# Patient Record
Sex: FEMALE | Race: WHITE | NOT HISPANIC OR LATINO | Employment: OTHER | URBAN - METROPOLITAN AREA
[De-identification: names, ages, dates, MRNs, and addresses within clinical notes are randomized per-mention and may not be internally consistent; named-entity substitution may affect disease eponyms.]

---

## 2023-01-08 ENCOUNTER — APPOINTMENT (OUTPATIENT)
Dept: RADIOLOGY | Facility: MEDICAL CENTER | Age: 73
DRG: 853 | End: 2023-01-08
Attending: EMERGENCY MEDICINE
Payer: MEDICARE

## 2023-01-08 ENCOUNTER — HOSPITAL ENCOUNTER (OUTPATIENT)
Dept: RADIOLOGY | Facility: MEDICAL CENTER | Age: 73
End: 2023-01-08

## 2023-01-08 ENCOUNTER — HOSPITAL ENCOUNTER (INPATIENT)
Facility: MEDICAL CENTER | Age: 73
LOS: 14 days | DRG: 853 | End: 2023-01-22
Attending: EMERGENCY MEDICINE | Admitting: STUDENT IN AN ORGANIZED HEALTH CARE EDUCATION/TRAINING PROGRAM
Payer: MEDICARE

## 2023-01-08 DIAGNOSIS — L89.314 PRESSURE INJURY OF RIGHT BUTTOCK, STAGE 4 (HCC): ICD-10-CM

## 2023-01-08 DIAGNOSIS — J90 PLEURAL EFFUSION: ICD-10-CM

## 2023-01-08 DIAGNOSIS — S72.91XB TYPE I OR II OPEN FRACTURE OF RIGHT FEMUR, UNSPECIFIED FRACTURE MORPHOLOGY, UNSPECIFIED PORTION OF FEMUR, INITIAL ENCOUNTER (HCC): ICD-10-CM

## 2023-01-08 DIAGNOSIS — M00.9 PYOGENIC ARTHRITIS OF LEFT KNEE JOINT, DUE TO UNSPECIFIED ORGANISM (HCC): Primary | ICD-10-CM

## 2023-01-08 DIAGNOSIS — S72.401B TYPE I OR II OPEN FRACTURE OF DISTAL END OF RIGHT FEMUR, UNSPECIFIED FRACTURE MORPHOLOGY, INITIAL ENCOUNTER (HCC): ICD-10-CM
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
## 2023-01-08 DIAGNOSIS — D64.9 ANEMIA, UNSPECIFIED TYPE: ICD-10-CM

## 2023-01-08 PROBLEM — E83.51 HYPOCALCEMIA: Status: ACTIVE | Noted: 2023-01-08

## 2023-01-08 PROBLEM — A41.9 SEPSIS (HCC): Status: ACTIVE | Noted: 2023-01-08

## 2023-01-08 PROBLEM — D50.9 MICROCYTIC ANEMIA: Status: ACTIVE | Noted: 2023-01-08

## 2023-01-08 PROBLEM — E44.1 MILD PROTEIN-CALORIE MALNUTRITION (HCC): Status: ACTIVE | Noted: 2023-01-08

## 2023-01-08 PROBLEM — N31.9 NEUROPATHIC BLADDER: Status: ACTIVE | Noted: 2023-01-08

## 2023-01-08 LAB
ABO + RH BLD: NORMAL
ABO GROUP BLD: NORMAL
ALBUMIN SERPL BCP-MCNC: 2.7 G/DL (ref 3.2–4.9)
ALBUMIN/GLOB SERPL: 1 G/DL
ALP SERPL-CCNC: 110 U/L (ref 30–99)
ALT SERPL-CCNC: 11 U/L (ref 2–50)
ANION GAP SERPL CALC-SCNC: 9 MMOL/L (ref 7–16)
APTT PPP: 32 SEC (ref 24.7–36)
AST SERPL-CCNC: 34 U/L (ref 12–45)
BILIRUB SERPL-MCNC: 0.3 MG/DL (ref 0.1–1.5)
BLD GP AB SCN SERPL QL: NORMAL
BUN SERPL-MCNC: 18 MG/DL (ref 8–22)
CALCIUM ALBUM COR SERPL-MCNC: 7.9 MG/DL (ref 8.5–10.5)
CALCIUM SERPL-MCNC: 6.9 MG/DL (ref 8.5–10.5)
CHLORIDE SERPL-SCNC: 111 MMOL/L (ref 96–112)
CO2 SERPL-SCNC: 19 MMOL/L (ref 20–33)
CREAT SERPL-MCNC: 0.4 MG/DL (ref 0.5–1.4)
ERYTHROCYTE [DISTWIDTH] IN BLOOD BY AUTOMATED COUNT: 69.6 FL (ref 35.9–50)
ETHANOL BLD-MCNC: <10.1 MG/DL
GFR SERPLBLD CREATININE-BSD FMLA CKD-EPI: 106 ML/MIN/1.73 M 2
GLOBULIN SER CALC-MCNC: 2.6 G/DL (ref 1.9–3.5)
GLUCOSE SERPL-MCNC: 88 MG/DL (ref 65–99)
HCT VFR BLD AUTO: 27.5 % (ref 37–47)
HGB BLD-MCNC: 8.3 G/DL (ref 12–16)
INR PPP: 1.02 (ref 0.87–1.13)
LACTATE SERPL-SCNC: 1.4 MMOL/L (ref 0.5–2)
MAGNESIUM SERPL-MCNC: 1.6 MG/DL (ref 1.5–2.5)
MCH RBC QN AUTO: 31.3 PG (ref 27–33)
MCHC RBC AUTO-ENTMCNC: 30.2 G/DL (ref 33.6–35)
MCV RBC AUTO: 103.8 FL (ref 81.4–97.8)
PLATELET # BLD AUTO: 460 K/UL (ref 164–446)
PMV BLD AUTO: 8.8 FL (ref 9–12.9)
POTASSIUM SERPL-SCNC: 4.1 MMOL/L (ref 3.6–5.5)
PROT SERPL-MCNC: 5.3 G/DL (ref 6–8.2)
PROTHROMBIN TIME: 13.3 SEC (ref 12–14.6)
RBC # BLD AUTO: 2.65 M/UL (ref 4.2–5.4)
RH BLD: NORMAL
SODIUM SERPL-SCNC: 139 MMOL/L (ref 135–145)
WBC # BLD AUTO: 14.9 K/UL (ref 4.8–10.8)

## 2023-01-08 PROCEDURE — 85610 PROTHROMBIN TIME: CPT

## 2023-01-08 PROCEDURE — 96365 THER/PROPH/DIAG IV INF INIT: CPT

## 2023-01-08 PROCEDURE — 87040 BLOOD CULTURE FOR BACTERIA: CPT

## 2023-01-08 PROCEDURE — 700102 HCHG RX REV CODE 250 W/ 637 OVERRIDE(OP): Performed by: STUDENT IN AN ORGANIZED HEALTH CARE EDUCATION/TRAINING PROGRAM

## 2023-01-08 PROCEDURE — 73700 CT LOWER EXTREMITY W/O DYE: CPT | Mod: RT

## 2023-01-08 PROCEDURE — 36415 COLL VENOUS BLD VENIPUNCTURE: CPT

## 2023-01-08 PROCEDURE — 87077 CULTURE AEROBIC IDENTIFY: CPT

## 2023-01-08 PROCEDURE — 72125 CT NECK SPINE W/O DYE: CPT

## 2023-01-08 PROCEDURE — 83605 ASSAY OF LACTIC ACID: CPT

## 2023-01-08 PROCEDURE — A9270 NON-COVERED ITEM OR SERVICE: HCPCS | Performed by: STUDENT IN AN ORGANIZED HEALTH CARE EDUCATION/TRAINING PROGRAM

## 2023-01-08 PROCEDURE — 85027 COMPLETE CBC AUTOMATED: CPT

## 2023-01-08 PROCEDURE — 86850 RBC ANTIBODY SCREEN: CPT

## 2023-01-08 PROCEDURE — 0241U HCHG SARS-COV-2 COVID-19 NFCT DS RESP RNA 4 TRGT MIC: CPT

## 2023-01-08 PROCEDURE — 82077 ASSAY SPEC XCP UR&BREATH IA: CPT

## 2023-01-08 PROCEDURE — 85730 THROMBOPLASTIN TIME PARTIAL: CPT

## 2023-01-08 PROCEDURE — G0390 TRAUMA RESPONS W/HOSP CRITI: HCPCS

## 2023-01-08 PROCEDURE — C9803 HOPD COVID-19 SPEC COLLECT: HCPCS | Performed by: EMERGENCY MEDICINE

## 2023-01-08 PROCEDURE — 96372 THER/PROPH/DIAG INJ SC/IM: CPT

## 2023-01-08 PROCEDURE — 87070 CULTURE OTHR SPECIMN AEROBIC: CPT

## 2023-01-08 PROCEDURE — 80053 COMPREHEN METABOLIC PANEL: CPT

## 2023-01-08 PROCEDURE — 70450 CT HEAD/BRAIN W/O DYE: CPT

## 2023-01-08 PROCEDURE — 96375 TX/PRO/DX INJ NEW DRUG ADDON: CPT

## 2023-01-08 PROCEDURE — 83735 ASSAY OF MAGNESIUM: CPT

## 2023-01-08 PROCEDURE — 71045 X-RAY EXAM CHEST 1 VIEW: CPT

## 2023-01-08 PROCEDURE — 96367 TX/PROPH/DG ADDL SEQ IV INF: CPT

## 2023-01-08 PROCEDURE — 86901 BLOOD TYPING SEROLOGIC RH(D): CPT

## 2023-01-08 PROCEDURE — 770022 HCHG ROOM/CARE - ICU (200)

## 2023-01-08 PROCEDURE — 86900 BLOOD TYPING SEROLOGIC ABO: CPT

## 2023-01-08 PROCEDURE — 87186 SC STD MICRODIL/AGAR DIL: CPT

## 2023-01-08 PROCEDURE — 700105 HCHG RX REV CODE 258: Performed by: EMERGENCY MEDICINE

## 2023-01-08 PROCEDURE — 700111 HCHG RX REV CODE 636 W/ 250 OVERRIDE (IP): Performed by: EMERGENCY MEDICINE

## 2023-01-08 PROCEDURE — 700111 HCHG RX REV CODE 636 W/ 250 OVERRIDE (IP): Performed by: STUDENT IN AN ORGANIZED HEALTH CARE EDUCATION/TRAINING PROGRAM

## 2023-01-08 PROCEDURE — 82607 VITAMIN B-12: CPT

## 2023-01-08 PROCEDURE — 87205 SMEAR GRAM STAIN: CPT

## 2023-01-08 PROCEDURE — 96368 THER/DIAG CONCURRENT INF: CPT

## 2023-01-08 PROCEDURE — 87641 MR-STAPH DNA AMP PROBE: CPT

## 2023-01-08 PROCEDURE — 99291 CRITICAL CARE FIRST HOUR: CPT

## 2023-01-08 RX ORDER — ACETAMINOPHEN 325 MG/1
650 TABLET ORAL EVERY 6 HOURS PRN
Status: DISCONTINUED | OUTPATIENT
Start: 2023-01-08 | End: 2023-01-22 | Stop reason: HOSPADM

## 2023-01-08 RX ORDER — HEPARIN SODIUM 5000 [USP'U]/ML
5000 INJECTION, SOLUTION INTRAVENOUS; SUBCUTANEOUS ONCE
Status: COMPLETED | OUTPATIENT
Start: 2023-01-08 | End: 2023-01-08

## 2023-01-08 RX ORDER — AMPICILLIN TRIHYDRATE 500 MG
450 CAPSULE ORAL DAILY
Status: ON HOLD | COMMUNITY
End: 2023-01-20

## 2023-01-08 RX ORDER — CALCIUM GLUCONATE 20 MG/ML
2 INJECTION, SOLUTION INTRAVENOUS ONCE
Status: COMPLETED | OUTPATIENT
Start: 2023-01-08 | End: 2023-01-09

## 2023-01-08 RX ORDER — OXYBUTYNIN CHLORIDE 5 MG/1
10 TABLET, EXTENDED RELEASE ORAL 2 TIMES DAILY
Status: DISCONTINUED | OUTPATIENT
Start: 2023-01-08 | End: 2023-01-22 | Stop reason: HOSPADM

## 2023-01-08 RX ORDER — LEVOTHYROXINE SODIUM 0.05 MG/1
50 TABLET ORAL
Status: DISCONTINUED | OUTPATIENT
Start: 2023-01-09 | End: 2023-01-22 | Stop reason: HOSPADM

## 2023-01-08 RX ORDER — LYSINE HCL 500 MG
500 TABLET ORAL DAILY
Status: ON HOLD | COMMUNITY
End: 2023-01-20

## 2023-01-08 RX ORDER — MULTIVIT WITH MINERALS/LUTEIN
1000 TABLET ORAL DAILY
Status: ON HOLD | COMMUNITY
End: 2023-01-20

## 2023-01-08 RX ORDER — CHOLECALCIFEROL (VITAMIN D3) 125 MCG
500 CAPSULE ORAL DAILY
Status: ON HOLD | COMMUNITY
End: 2023-01-20

## 2023-01-08 RX ORDER — SODIUM CHLORIDE, SODIUM LACTATE, POTASSIUM CHLORIDE, CALCIUM CHLORIDE 600; 310; 30; 20 MG/100ML; MG/100ML; MG/100ML; MG/100ML
1000 INJECTION, SOLUTION INTRAVENOUS ONCE
Status: COMPLETED | OUTPATIENT
Start: 2023-01-08 | End: 2023-01-09

## 2023-01-08 RX ORDER — CHOLECALCIFEROL (VITAMIN D3) 125 MCG
500 CAPSULE ORAL DAILY
Status: DISCONTINUED | OUTPATIENT
Start: 2023-01-09 | End: 2023-01-22 | Stop reason: HOSPADM

## 2023-01-08 RX ORDER — PYRIDOXINE HCL (VITAMIN B6) 50 MG
50 TABLET ORAL DAILY
Status: ON HOLD | COMMUNITY
End: 2023-01-20

## 2023-01-08 RX ORDER — M-VIT,TX,IRON,MINS/CALC/FOLIC 27MG-0.4MG
1 TABLET ORAL DAILY
Status: ON HOLD | COMMUNITY
End: 2023-01-20

## 2023-01-08 RX ORDER — OXYBUTYNIN CHLORIDE 10 MG/1
10 TABLET, EXTENDED RELEASE ORAL 2 TIMES DAILY
Status: ON HOLD | COMMUNITY
End: 2023-01-20

## 2023-01-08 RX ORDER — POLYETHYLENE GLYCOL 3350 17 G/17G
1 POWDER, FOR SOLUTION ORAL
Status: DISCONTINUED | OUTPATIENT
Start: 2023-01-08 | End: 2023-01-22 | Stop reason: HOSPADM

## 2023-01-08 RX ORDER — AMOXICILLIN 250 MG
2 CAPSULE ORAL 2 TIMES DAILY
Status: DISCONTINUED | OUTPATIENT
Start: 2023-01-08 | End: 2023-01-22 | Stop reason: HOSPADM

## 2023-01-08 RX ORDER — SODIUM CHLORIDE, SODIUM LACTATE, POTASSIUM CHLORIDE, AND CALCIUM CHLORIDE .6; .31; .03; .02 G/100ML; G/100ML; G/100ML; G/100ML
30 INJECTION, SOLUTION INTRAVENOUS
Status: DISCONTINUED | OUTPATIENT
Start: 2023-01-08 | End: 2023-01-22 | Stop reason: HOSPADM

## 2023-01-08 RX ORDER — BISACODYL 10 MG
10 SUPPOSITORY, RECTAL RECTAL
Status: DISCONTINUED | OUTPATIENT
Start: 2023-01-08 | End: 2023-01-22 | Stop reason: HOSPADM

## 2023-01-08 RX ORDER — LEVOTHYROXINE SODIUM 0.05 MG/1
50 TABLET ORAL
COMMUNITY

## 2023-01-08 RX ADMIN — CEFTRIAXONE SODIUM 1000 MG: 10 INJECTION, POWDER, FOR SOLUTION INTRAVENOUS at 23:53

## 2023-01-08 RX ADMIN — OXYBUTYNIN CHLORIDE 10 MG: 10 TABLET, EXTENDED RELEASE ORAL at 23:53

## 2023-01-08 RX ADMIN — VANCOMYCIN HYDROCHLORIDE 1500 MG: 500 INJECTION, POWDER, LYOPHILIZED, FOR SOLUTION INTRAVENOUS at 23:03

## 2023-01-08 RX ADMIN — SODIUM CHLORIDE, POTASSIUM CHLORIDE, SODIUM LACTATE AND CALCIUM CHLORIDE 1000 ML: 600; 310; 30; 20 INJECTION, SOLUTION INTRAVENOUS at 21:52

## 2023-01-08 RX ADMIN — CEFAZOLIN 2 G: 2 INJECTION, POWDER, FOR SOLUTION INTRAMUSCULAR; INTRAVENOUS at 22:09

## 2023-01-08 RX ADMIN — CALCIUM GLUCONATE 2 G: 20 INJECTION, SOLUTION INTRAVENOUS at 23:54

## 2023-01-08 RX ADMIN — HEPARIN SODIUM 5000 UNITS: 5000 INJECTION, SOLUTION INTRAVENOUS; SUBCUTANEOUS at 23:52

## 2023-01-09 ENCOUNTER — APPOINTMENT (OUTPATIENT)
Dept: RADIOLOGY | Facility: MEDICAL CENTER | Age: 73
DRG: 853 | End: 2023-01-09
Attending: ORTHOPAEDIC SURGERY
Payer: MEDICARE

## 2023-01-09 ENCOUNTER — ANESTHESIA EVENT (OUTPATIENT)
Dept: SURGERY | Facility: MEDICAL CENTER | Age: 73
DRG: 853 | End: 2023-01-09
Payer: MEDICARE

## 2023-01-09 ENCOUNTER — APPOINTMENT (OUTPATIENT)
Dept: RADIOLOGY | Facility: MEDICAL CENTER | Age: 73
DRG: 853 | End: 2023-01-09
Attending: STUDENT IN AN ORGANIZED HEALTH CARE EDUCATION/TRAINING PROGRAM
Payer: MEDICARE

## 2023-01-09 ENCOUNTER — ANESTHESIA (OUTPATIENT)
Dept: SURGERY | Facility: MEDICAL CENTER | Age: 73
DRG: 853 | End: 2023-01-09
Payer: MEDICARE

## 2023-01-09 PROBLEM — D64.9 ANEMIA: Status: ACTIVE | Noted: 2023-01-09

## 2023-01-09 PROBLEM — R65.21 SEPTIC SHOCK (HCC): Status: ACTIVE | Noted: 2023-01-08

## 2023-01-09 LAB
ALBUMIN SERPL BCP-MCNC: 2.6 G/DL (ref 3.2–4.9)
ALBUMIN/GLOB SERPL: 1 G/DL
ALP SERPL-CCNC: 111 U/L (ref 30–99)
ALT SERPL-CCNC: 7 U/L (ref 2–50)
ANION GAP SERPL CALC-SCNC: 8 MMOL/L (ref 7–16)
ANION GAP SERPL CALC-SCNC: 9 MMOL/L (ref 7–16)
ANISOCYTOSIS BLD QL SMEAR: ABNORMAL
APPEARANCE UR: CLEAR
AST SERPL-CCNC: 13 U/L (ref 12–45)
BACTERIA #/AREA URNS HPF: ABNORMAL /HPF
BASOPHILS # BLD AUTO: 0 % (ref 0–1.8)
BASOPHILS # BLD AUTO: 0.2 % (ref 0–1.8)
BASOPHILS # BLD: 0 K/UL (ref 0–0.12)
BASOPHILS # BLD: 0.04 K/UL (ref 0–0.12)
BILIRUB SERPL-MCNC: 0.3 MG/DL (ref 0.1–1.5)
BILIRUB UR QL STRIP.AUTO: NEGATIVE
BUN SERPL-MCNC: 14 MG/DL (ref 8–22)
BUN SERPL-MCNC: 17 MG/DL (ref 8–22)
CA-I SERPL-SCNC: 1.1 MMOL/L (ref 1.1–1.3)
CALCIUM ALBUM COR SERPL-MCNC: 9.2 MG/DL (ref 8.5–10.5)
CALCIUM SERPL-MCNC: 7.4 MG/DL (ref 8.5–10.5)
CALCIUM SERPL-MCNC: 8.1 MG/DL (ref 8.5–10.5)
CHLORIDE SERPL-SCNC: 109 MMOL/L (ref 96–112)
CHLORIDE SERPL-SCNC: 110 MMOL/L (ref 96–112)
CO2 SERPL-SCNC: 20 MMOL/L (ref 20–33)
CO2 SERPL-SCNC: 21 MMOL/L (ref 20–33)
COLOR UR: YELLOW
CORTIS SERPL-MCNC: 7.6 UG/DL (ref 0–23)
CREAT SERPL-MCNC: 0.42 MG/DL (ref 0.5–1.4)
CREAT SERPL-MCNC: 0.44 MG/DL (ref 0.5–1.4)
EOSINOPHIL # BLD AUTO: 0.01 K/UL (ref 0–0.51)
EOSINOPHIL # BLD AUTO: 0.21 K/UL (ref 0–0.51)
EOSINOPHIL NFR BLD: 0 % (ref 0–6.9)
EOSINOPHIL NFR BLD: 1.7 % (ref 0–6.9)
EPI CELLS #/AREA URNS HPF: NEGATIVE /HPF
ERYTHROCYTE [DISTWIDTH] IN BLOOD BY AUTOMATED COUNT: 69.2 FL (ref 35.9–50)
ERYTHROCYTE [DISTWIDTH] IN BLOOD BY AUTOMATED COUNT: 69.5 FL (ref 35.9–50)
FLUAV RNA SPEC QL NAA+PROBE: NEGATIVE
FLUBV RNA SPEC QL NAA+PROBE: NEGATIVE
FOLATE SERPL-MCNC: 12.5 NG/ML
FUNGUS SPEC FUNGUS STN: NORMAL
GFR SERPLBLD CREATININE-BSD FMLA CKD-EPI: 103 ML/MIN/1.73 M 2
GFR SERPLBLD CREATININE-BSD FMLA CKD-EPI: 105 ML/MIN/1.73 M 2
GLOBULIN SER CALC-MCNC: 2.5 G/DL (ref 1.9–3.5)
GLUCOSE SERPL-MCNC: 118 MG/DL (ref 65–99)
GLUCOSE SERPL-MCNC: 99 MG/DL (ref 65–99)
GLUCOSE UR STRIP.AUTO-MCNC: NEGATIVE MG/DL
GRAM STN SPEC: NORMAL
HCT VFR BLD AUTO: 23.9 % (ref 37–47)
HCT VFR BLD AUTO: 25.9 % (ref 37–47)
HGB BLD-MCNC: 7.2 G/DL (ref 12–16)
HGB BLD-MCNC: 7.8 G/DL (ref 12–16)
HYALINE CASTS #/AREA URNS LPF: ABNORMAL /LPF
HYPOCHROMIA BLD QL SMEAR: ABNORMAL
IMM GRANULOCYTES # BLD AUTO: 0.22 K/UL (ref 0–0.11)
IMM GRANULOCYTES NFR BLD AUTO: 0.9 % (ref 0–0.9)
IRON SATN MFR SERPL: 9 % (ref 15–55)
IRON SERPL-MCNC: 20 UG/DL (ref 40–170)
KETONES UR STRIP.AUTO-MCNC: 15 MG/DL
LACTATE SERPL-SCNC: 1.7 MMOL/L (ref 0.5–2)
LEUKOCYTE ESTERASE UR QL STRIP.AUTO: ABNORMAL
LYMPHOCYTES # BLD AUTO: 0.47 K/UL (ref 1–4.8)
LYMPHOCYTES # BLD AUTO: 0.76 K/UL (ref 1–4.8)
LYMPHOCYTES NFR BLD: 1.9 % (ref 22–41)
LYMPHOCYTES NFR BLD: 6.1 % (ref 22–41)
MACROCYTES BLD QL SMEAR: ABNORMAL
MAGNESIUM SERPL-MCNC: 1.7 MG/DL (ref 1.5–2.5)
MANUAL DIFF BLD: NORMAL
MCH RBC QN AUTO: 31.3 PG (ref 27–33)
MCH RBC QN AUTO: 31.6 PG (ref 27–33)
MCHC RBC AUTO-ENTMCNC: 30.1 G/DL (ref 33.6–35)
MCHC RBC AUTO-ENTMCNC: 30.1 G/DL (ref 33.6–35)
MCV RBC AUTO: 104 FL (ref 81.4–97.8)
MCV RBC AUTO: 104.8 FL (ref 81.4–97.8)
MICRO URNS: ABNORMAL
MICROCYTES BLD QL SMEAR: ABNORMAL
MONOCYTES # BLD AUTO: 0.35 K/UL (ref 0–0.85)
MONOCYTES # BLD AUTO: 0.64 K/UL (ref 0–0.85)
MONOCYTES NFR BLD AUTO: 1.4 % (ref 0–13.4)
MONOCYTES NFR BLD AUTO: 5.2 % (ref 0–13.4)
MORPHOLOGY BLD-IMP: NORMAL
NEUTROPHILS # BLD AUTO: 10.79 K/UL (ref 2–7.15)
NEUTROPHILS # BLD AUTO: 23.25 K/UL (ref 2–7.15)
NEUTROPHILS NFR BLD: 81.8 % (ref 44–72)
NEUTROPHILS NFR BLD: 95.6 % (ref 44–72)
NEUTS BAND NFR BLD MANUAL: 5.2 % (ref 0–10)
NITRITE UR QL STRIP.AUTO: POSITIVE
NRBC # BLD AUTO: 0 K/UL
NRBC # BLD AUTO: 0 K/UL
NRBC BLD-RTO: 0 /100 WBC
NRBC BLD-RTO: 0 /100 WBC
PH UR STRIP.AUTO: 5.5 [PH] (ref 5–8)
PHOSPHATE SERPL-MCNC: 2.4 MG/DL (ref 2.5–4.5)
PLATELET # BLD AUTO: 504 K/UL (ref 164–446)
PLATELET # BLD AUTO: 544 K/UL (ref 164–446)
PLATELET BLD QL SMEAR: NORMAL
PMV BLD AUTO: 8.7 FL (ref 9–12.9)
PMV BLD AUTO: 8.8 FL (ref 9–12.9)
POLYCHROMASIA BLD QL SMEAR: NORMAL
POTASSIUM SERPL-SCNC: 4.1 MMOL/L (ref 3.6–5.5)
POTASSIUM SERPL-SCNC: 4.2 MMOL/L (ref 3.6–5.5)
PROT SERPL-MCNC: 5.1 G/DL (ref 6–8.2)
PROT UR QL STRIP: NEGATIVE MG/DL
RBC # BLD AUTO: 2.28 M/UL (ref 4.2–5.4)
RBC # BLD AUTO: 2.49 M/UL (ref 4.2–5.4)
RBC # URNS HPF: ABNORMAL /HPF
RBC BLD AUTO: PRESENT
RBC UR QL AUTO: NEGATIVE
RSV RNA SPEC QL NAA+PROBE: NEGATIVE
SARS-COV-2 RNA RESP QL NAA+PROBE: NOTDETECTED
SCCMEC + MECA PNL NOSE NAA+PROBE: NEGATIVE
SIGNIFICANT IND 70042: NORMAL
SITE SITE: NORMAL
SODIUM SERPL-SCNC: 138 MMOL/L (ref 135–145)
SODIUM SERPL-SCNC: 139 MMOL/L (ref 135–145)
SOURCE SOURCE: NORMAL
SP GR UR STRIP.AUTO: 1.03
SPECIMEN SOURCE: NORMAL
T4 FREE SERPL-MCNC: 0.94 NG/DL (ref 0.93–1.7)
TIBC SERPL-MCNC: 218 UG/DL (ref 250–450)
TSH SERPL DL<=0.005 MIU/L-ACNC: 6.3 UIU/ML (ref 0.38–5.33)
UIBC SERPL-MCNC: 198 UG/DL (ref 110–370)
UROBILINOGEN UR STRIP.AUTO-MCNC: 0.2 MG/DL
VIT B12 SERPL-MCNC: 2559 PG/ML (ref 211–911)
VIT B12 SERPL-MCNC: 2632 PG/ML (ref 211–911)
WBC # BLD AUTO: 12.4 K/UL (ref 4.8–10.8)
WBC # BLD AUTO: 24.3 K/UL (ref 4.8–10.8)
WBC #/AREA URNS HPF: ABNORMAL /HPF

## 2023-01-09 PROCEDURE — 160048 HCHG OR STATISTICAL LEVEL 1-5: Performed by: ORTHOPAEDIC SURGERY

## 2023-01-09 PROCEDURE — 87206 SMEAR FLUORESCENT/ACID STAI: CPT

## 2023-01-09 PROCEDURE — 303105 HCHG CATHETER EXTRA

## 2023-01-09 PROCEDURE — 0QHB06Z INSERTION OF INTRAMEDULLARY INTERNAL FIXATION DEVICE INTO RIGHT LOWER FEMUR, OPEN APPROACH: ICD-10-PCS | Performed by: ORTHOPAEDIC SURGERY

## 2023-01-09 PROCEDURE — 3E0T3BZ INTRODUCTION OF ANESTHETIC AGENT INTO PERIPHERAL NERVES AND PLEXI, PERCUTANEOUS APPROACH: ICD-10-PCS | Performed by: STUDENT IN AN ORGANIZED HEALTH CARE EDUCATION/TRAINING PROGRAM

## 2023-01-09 PROCEDURE — C1713 ANCHOR/SCREW BN/BN,TIS/BN: HCPCS | Performed by: ORTHOPAEDIC SURGERY

## 2023-01-09 PROCEDURE — 160035 HCHG PACU - 1ST 60 MINS PHASE I: Performed by: ORTHOPAEDIC SURGERY

## 2023-01-09 PROCEDURE — 700111 HCHG RX REV CODE 636 W/ 250 OVERRIDE (IP): Performed by: STUDENT IN AN ORGANIZED HEALTH CARE EDUCATION/TRAINING PROGRAM

## 2023-01-09 PROCEDURE — 770022 HCHG ROOM/CARE - ICU (200)

## 2023-01-09 PROCEDURE — 87015 SPECIMEN INFECT AGNT CONCNTJ: CPT | Mod: 91

## 2023-01-09 PROCEDURE — 84443 ASSAY THYROID STIM HORMONE: CPT

## 2023-01-09 PROCEDURE — 73552 X-RAY EXAM OF FEMUR 2/>: CPT | Mod: RT

## 2023-01-09 PROCEDURE — 99100 ANES PT EXTEME AGE<1 YR&>70: CPT | Performed by: STUDENT IN AN ORGANIZED HEALTH CARE EDUCATION/TRAINING PROGRAM

## 2023-01-09 PROCEDURE — 700105 HCHG RX REV CODE 258: Performed by: STUDENT IN AN ORGANIZED HEALTH CARE EDUCATION/TRAINING PROGRAM

## 2023-01-09 PROCEDURE — 51702 INSERT TEMP BLADDER CATH: CPT

## 2023-01-09 PROCEDURE — 36415 COLL VENOUS BLD VENIPUNCTURE: CPT

## 2023-01-09 PROCEDURE — 82330 ASSAY OF CALCIUM: CPT

## 2023-01-09 PROCEDURE — 700102 HCHG RX REV CODE 250 W/ 637 OVERRIDE(OP): Performed by: STUDENT IN AN ORGANIZED HEALTH CARE EDUCATION/TRAINING PROGRAM

## 2023-01-09 PROCEDURE — 160002 HCHG RECOVERY MINUTES (STAT): Performed by: ORTHOPAEDIC SURGERY

## 2023-01-09 PROCEDURE — 87186 SC STD MICRODIL/AGAR DIL: CPT

## 2023-01-09 PROCEDURE — 01360 ANES OPEN PX LOWER 1/3 FEMUR: CPT | Performed by: STUDENT IN AN ORGANIZED HEALTH CARE EDUCATION/TRAINING PROGRAM

## 2023-01-09 PROCEDURE — 82533 TOTAL CORTISOL: CPT

## 2023-01-09 PROCEDURE — 700101 HCHG RX REV CODE 250: Performed by: STUDENT IN AN ORGANIZED HEALTH CARE EDUCATION/TRAINING PROGRAM

## 2023-01-09 PROCEDURE — 85007 BL SMEAR W/DIFF WBC COUNT: CPT

## 2023-01-09 PROCEDURE — 80048 BASIC METABOLIC PNL TOTAL CA: CPT

## 2023-01-09 PROCEDURE — 99233 SBSQ HOSP IP/OBS HIGH 50: CPT | Performed by: INTERNAL MEDICINE

## 2023-01-09 PROCEDURE — 81001 URINALYSIS AUTO W/SCOPE: CPT

## 2023-01-09 PROCEDURE — A9270 NON-COVERED ITEM OR SERVICE: HCPCS | Performed by: STUDENT IN AN ORGANIZED HEALTH CARE EDUCATION/TRAINING PROGRAM

## 2023-01-09 PROCEDURE — 84100 ASSAY OF PHOSPHORUS: CPT

## 2023-01-09 PROCEDURE — 160028 HCHG SURGERY MINUTES - 1ST 30 MINS LEVEL 3: Performed by: ORTHOPAEDIC SURGERY

## 2023-01-09 PROCEDURE — 160036 HCHG PACU - EA ADDL 30 MINS PHASE I: Performed by: ORTHOPAEDIC SURGERY

## 2023-01-09 PROCEDURE — 160009 HCHG ANES TIME/MIN: Performed by: ORTHOPAEDIC SURGERY

## 2023-01-09 PROCEDURE — 700105 HCHG RX REV CODE 258: Performed by: INTERNAL MEDICINE

## 2023-01-09 PROCEDURE — 502000 HCHG MISC OR IMPLANTS RC 0278: Performed by: ORTHOPAEDIC SURGERY

## 2023-01-09 PROCEDURE — 87116 MYCOBACTERIA CULTURE: CPT

## 2023-01-09 PROCEDURE — 83540 ASSAY OF IRON: CPT

## 2023-01-09 PROCEDURE — 87070 CULTURE OTHR SPECIMN AEROBIC: CPT | Mod: 91

## 2023-01-09 PROCEDURE — 99223 1ST HOSP IP/OBS HIGH 75: CPT | Mod: 57 | Performed by: ORTHOPAEDIC SURGERY

## 2023-01-09 PROCEDURE — 110371 HCHG SHELL REV 272: Performed by: ORTHOPAEDIC SURGERY

## 2023-01-09 PROCEDURE — 80053 COMPREHEN METABOLIC PANEL: CPT

## 2023-01-09 PROCEDURE — 87077 CULTURE AEROBIC IDENTIFY: CPT | Mod: 91

## 2023-01-09 PROCEDURE — 64447 NJX AA&/STRD FEMORAL NRV IMG: CPT | Performed by: ORTHOPAEDIC SURGERY

## 2023-01-09 PROCEDURE — 27506 TREATMENT OF THIGH FRACTURE: CPT | Mod: 80ROC,RT | Performed by: STUDENT IN AN ORGANIZED HEALTH CARE EDUCATION/TRAINING PROGRAM

## 2023-01-09 PROCEDURE — 700111 HCHG RX REV CODE 636 W/ 250 OVERRIDE (IP): Performed by: INTERNAL MEDICINE

## 2023-01-09 PROCEDURE — 83550 IRON BINDING TEST: CPT

## 2023-01-09 PROCEDURE — 82746 ASSAY OF FOLIC ACID SERUM: CPT

## 2023-01-09 PROCEDURE — 85025 COMPLETE CBC W/AUTO DIFF WBC: CPT

## 2023-01-09 PROCEDURE — 99291 CRITICAL CARE FIRST HOUR: CPT | Performed by: INTERNAL MEDICINE

## 2023-01-09 PROCEDURE — 87102 FUNGUS ISOLATION CULTURE: CPT | Mod: 91

## 2023-01-09 PROCEDURE — 700111 HCHG RX REV CODE 636 W/ 250 OVERRIDE (IP): Performed by: ORTHOPAEDIC SURGERY

## 2023-01-09 PROCEDURE — 87205 SMEAR GRAM STAIN: CPT | Mod: 91

## 2023-01-09 PROCEDURE — 96366 THER/PROPH/DIAG IV INF ADDON: CPT

## 2023-01-09 PROCEDURE — 84439 ASSAY OF FREE THYROXINE: CPT

## 2023-01-09 PROCEDURE — 83605 ASSAY OF LACTIC ACID: CPT

## 2023-01-09 PROCEDURE — 27506 TREATMENT OF THIGH FRACTURE: CPT | Mod: RT | Performed by: ORTHOPAEDIC SURGERY

## 2023-01-09 PROCEDURE — 160039 HCHG SURGERY MINUTES - EA ADDL 1 MIN LEVEL 3: Performed by: ORTHOPAEDIC SURGERY

## 2023-01-09 PROCEDURE — 87075 CULTR BACTERIA EXCEPT BLOOD: CPT | Mod: 91

## 2023-01-09 PROCEDURE — 83735 ASSAY OF MAGNESIUM: CPT

## 2023-01-09 PROCEDURE — 82607 VITAMIN B-12: CPT

## 2023-01-09 DEVICE — ADVANCED LOCKING SCREW 5X37.5MM: Type: IMPLANTABLE DEVICE | Site: KNEE | Status: FUNCTIONAL

## 2023-01-09 DEVICE — ADVANCED LOCKING SCREW 5X65MM: Type: IMPLANTABLE DEVICE | Site: KNEE | Status: FUNCTIONAL

## 2023-01-09 DEVICE — IMPLANTABLE DEVICE: Type: IMPLANTABLE DEVICE | Site: KNEE | Status: FUNCTIONAL

## 2023-01-09 DEVICE — ADVANCED LOCKING SCREW 5X75MM: Type: IMPLANTABLE DEVICE | Site: KNEE | Status: FUNCTIONAL

## 2023-01-09 DEVICE — K-WIRE 3X285MM: Type: IMPLANTABLE DEVICE | Site: KNEE | Status: FUNCTIONAL

## 2023-01-09 DEVICE — LOCKING SCREW DIA 5X35MM: Type: IMPLANTABLE DEVICE | Site: KNEE | Status: FUNCTIONAL

## 2023-01-09 DEVICE — LOCKING SCREW DIA 5X37.5MM: Type: IMPLANTABLE DEVICE | Site: KNEE | Status: FUNCTIONAL

## 2023-01-09 DEVICE — BONE CEMENT SIMPLEX ANTIBIO - (10/PK): Type: IMPLANTABLE DEVICE | Site: KNEE | Status: FUNCTIONAL

## 2023-01-09 DEVICE — ADVANCED LOCKING SCREW 5X70MM: Type: IMPLANTABLE DEVICE | Site: KNEE | Status: FUNCTIONAL

## 2023-01-09 RX ORDER — SODIUM CHLORIDE, SODIUM LACTATE, POTASSIUM CHLORIDE, CALCIUM CHLORIDE 600; 310; 30; 20 MG/100ML; MG/100ML; MG/100ML; MG/100ML
500 INJECTION, SOLUTION INTRAVENOUS ONCE
Status: COMPLETED | OUTPATIENT
Start: 2023-01-09 | End: 2023-01-09

## 2023-01-09 RX ORDER — ONDANSETRON 2 MG/ML
INJECTION INTRAMUSCULAR; INTRAVENOUS PRN
Status: DISCONTINUED | OUTPATIENT
Start: 2023-01-09 | End: 2023-01-09 | Stop reason: SURG

## 2023-01-09 RX ORDER — SODIUM CHLORIDE, SODIUM LACTATE, POTASSIUM CHLORIDE, CALCIUM CHLORIDE 600; 310; 30; 20 MG/100ML; MG/100ML; MG/100ML; MG/100ML
1000 INJECTION, SOLUTION INTRAVENOUS ONCE
Status: DISCONTINUED | OUTPATIENT
Start: 2023-01-09 | End: 2023-01-09

## 2023-01-09 RX ORDER — DIPHENHYDRAMINE HYDROCHLORIDE 50 MG/ML
12.5 INJECTION INTRAMUSCULAR; INTRAVENOUS
Status: DISCONTINUED | OUTPATIENT
Start: 2023-01-09 | End: 2023-01-09 | Stop reason: HOSPADM

## 2023-01-09 RX ORDER — SODIUM CHLORIDE 9 MG/ML
INJECTION, SOLUTION INTRAVENOUS
Status: DISCONTINUED | OUTPATIENT
Start: 2023-01-09 | End: 2023-01-09 | Stop reason: SURG

## 2023-01-09 RX ORDER — TOBRAMYCIN 1.2 G/30ML
INJECTION, POWDER, LYOPHILIZED, FOR SOLUTION INTRAVENOUS
Status: DISCONTINUED | OUTPATIENT
Start: 2023-01-09 | End: 2023-01-09 | Stop reason: HOSPADM

## 2023-01-09 RX ORDER — CEFAZOLIN SODIUM 1 G/3ML
INJECTION, POWDER, FOR SOLUTION INTRAMUSCULAR; INTRAVENOUS PRN
Status: DISCONTINUED | OUTPATIENT
Start: 2023-01-09 | End: 2023-01-09 | Stop reason: SURG

## 2023-01-09 RX ORDER — PHENYLEPHRINE HYDROCHLORIDE 10 MG/ML
INJECTION, SOLUTION INTRAMUSCULAR; INTRAVENOUS; SUBCUTANEOUS PRN
Status: DISCONTINUED | OUTPATIENT
Start: 2023-01-09 | End: 2023-01-09 | Stop reason: SURG

## 2023-01-09 RX ORDER — VASOPRESSIN 20 U/ML
INJECTION PARENTERAL PRN
Status: DISCONTINUED | OUTPATIENT
Start: 2023-01-09 | End: 2023-01-09 | Stop reason: SURG

## 2023-01-09 RX ORDER — VANCOMYCIN HYDROCHLORIDE 1 G/20ML
INJECTION, POWDER, LYOPHILIZED, FOR SOLUTION INTRAVENOUS
Status: COMPLETED | OUTPATIENT
Start: 2023-01-09 | End: 2023-01-09

## 2023-01-09 RX ORDER — HALOPERIDOL 5 MG/ML
1 INJECTION INTRAMUSCULAR
Status: DISCONTINUED | OUTPATIENT
Start: 2023-01-09 | End: 2023-01-09 | Stop reason: HOSPADM

## 2023-01-09 RX ORDER — HYDROMORPHONE HYDROCHLORIDE 1 MG/ML
0.1 INJECTION, SOLUTION INTRAMUSCULAR; INTRAVENOUS; SUBCUTANEOUS
Status: DISCONTINUED | OUTPATIENT
Start: 2023-01-09 | End: 2023-01-09 | Stop reason: HOSPADM

## 2023-01-09 RX ORDER — BUPIVACAINE HYDROCHLORIDE 5 MG/ML
INJECTION, SOLUTION EPIDURAL; INTRACAUDAL
Status: COMPLETED | OUTPATIENT
Start: 2023-01-09 | End: 2023-01-09

## 2023-01-09 RX ORDER — HYDROMORPHONE HYDROCHLORIDE 1 MG/ML
0.4 INJECTION, SOLUTION INTRAMUSCULAR; INTRAVENOUS; SUBCUTANEOUS
Status: DISCONTINUED | OUTPATIENT
Start: 2023-01-09 | End: 2023-01-09 | Stop reason: HOSPADM

## 2023-01-09 RX ORDER — SODIUM HYPOCHLORITE 1.25 MG/ML
SOLUTION TOPICAL 2 TIMES DAILY
Status: DISCONTINUED | OUTPATIENT
Start: 2023-01-09 | End: 2023-01-22 | Stop reason: HOSPADM

## 2023-01-09 RX ORDER — ONDANSETRON 2 MG/ML
4 INJECTION INTRAMUSCULAR; INTRAVENOUS
Status: DISCONTINUED | OUTPATIENT
Start: 2023-01-09 | End: 2023-01-09 | Stop reason: HOSPADM

## 2023-01-09 RX ORDER — SODIUM CHLORIDE 9 MG/ML
1000 INJECTION, SOLUTION INTRAVENOUS ONCE
Status: COMPLETED | OUTPATIENT
Start: 2023-01-09 | End: 2023-01-09

## 2023-01-09 RX ORDER — LIDOCAINE HYDROCHLORIDE 20 MG/ML
INJECTION, SOLUTION EPIDURAL; INFILTRATION; INTRACAUDAL; PERINEURAL PRN
Status: DISCONTINUED | OUTPATIENT
Start: 2023-01-09 | End: 2023-01-09 | Stop reason: SURG

## 2023-01-09 RX ORDER — HYDROMORPHONE HYDROCHLORIDE 1 MG/ML
0.2 INJECTION, SOLUTION INTRAMUSCULAR; INTRAVENOUS; SUBCUTANEOUS
Status: DISCONTINUED | OUTPATIENT
Start: 2023-01-09 | End: 2023-01-09 | Stop reason: HOSPADM

## 2023-01-09 RX ORDER — SODIUM CHLORIDE 9 MG/ML
INJECTION, SOLUTION INTRAVENOUS CONTINUOUS
Status: DISCONTINUED | OUTPATIENT
Start: 2023-01-09 | End: 2023-01-12

## 2023-01-09 RX ORDER — OXYCODONE HYDROCHLORIDE 5 MG/1
5 TABLET ORAL EVERY 4 HOURS PRN
Status: DISCONTINUED | OUTPATIENT
Start: 2023-01-09 | End: 2023-01-22 | Stop reason: HOSPADM

## 2023-01-09 RX ORDER — OXYCODONE HCL 5 MG/5 ML
5 SOLUTION, ORAL ORAL
Status: DISCONTINUED | OUTPATIENT
Start: 2023-01-09 | End: 2023-01-09 | Stop reason: HOSPADM

## 2023-01-09 RX ORDER — OXYCODONE HCL 5 MG/5 ML
10 SOLUTION, ORAL ORAL
Status: DISCONTINUED | OUTPATIENT
Start: 2023-01-09 | End: 2023-01-09 | Stop reason: HOSPADM

## 2023-01-09 RX ORDER — DEXAMETHASONE SODIUM PHOSPHATE 4 MG/ML
INJECTION, SOLUTION INTRA-ARTICULAR; INTRALESIONAL; INTRAMUSCULAR; INTRAVENOUS; SOFT TISSUE PRN
Status: DISCONTINUED | OUTPATIENT
Start: 2023-01-09 | End: 2023-01-09 | Stop reason: SURG

## 2023-01-09 RX ORDER — SODIUM CHLORIDE, SODIUM LACTATE, POTASSIUM CHLORIDE, CALCIUM CHLORIDE 600; 310; 30; 20 MG/100ML; MG/100ML; MG/100ML; MG/100ML
INJECTION, SOLUTION INTRAVENOUS CONTINUOUS
Status: DISCONTINUED | OUTPATIENT
Start: 2023-01-09 | End: 2023-01-09 | Stop reason: HOSPADM

## 2023-01-09 RX ADMIN — SODIUM CHLORIDE: 9 INJECTION, SOLUTION INTRAVENOUS at 09:39

## 2023-01-09 RX ADMIN — ONDANSETRON 4 MG: 2 INJECTION INTRAMUSCULAR; INTRAVENOUS at 13:00

## 2023-01-09 RX ADMIN — PHENYLEPHRINE HYDROCHLORIDE 150 MCG: 10 INJECTION INTRAVENOUS at 11:43

## 2023-01-09 RX ADMIN — PHENYLEPHRINE HYDROCHLORIDE 150 MCG: 10 INJECTION INTRAVENOUS at 13:13

## 2023-01-09 RX ADMIN — PIPERACILLIN AND TAZOBACTAM 4.5 G: 4; .5 INJECTION, POWDER, LYOPHILIZED, FOR SOLUTION INTRAVENOUS; PARENTERAL at 18:11

## 2023-01-09 RX ADMIN — PROPOFOL 30 MG: 10 INJECTION, EMULSION INTRAVENOUS at 13:21

## 2023-01-09 RX ADMIN — PHENYLEPHRINE HYDROCHLORIDE 150 MCG: 10 INJECTION INTRAVENOUS at 13:39

## 2023-01-09 RX ADMIN — DOCUSATE SODIUM 50 MG AND SENNOSIDES 8.6 MG 2 TABLET: 8.6; 5 TABLET, FILM COATED ORAL at 16:58

## 2023-01-09 RX ADMIN — PROPOFOL 60 MG: 10 INJECTION, EMULSION INTRAVENOUS at 11:07

## 2023-01-09 RX ADMIN — OXYCODONE HYDROCHLORIDE 5 MG: 5 TABLET ORAL at 08:07

## 2023-01-09 RX ADMIN — SODIUM CHLORIDE: 9 INJECTION, SOLUTION INTRAVENOUS at 16:23

## 2023-01-09 RX ADMIN — EPHEDRINE SULFATE 5 MG: 50 INJECTION INTRAMUSCULAR; INTRAVENOUS; SUBCUTANEOUS at 11:07

## 2023-01-09 RX ADMIN — PROPOFOL 30 MG: 10 INJECTION, EMULSION INTRAVENOUS at 13:12

## 2023-01-09 RX ADMIN — PHENYLEPHRINE HYDROCHLORIDE 100 MCG: 10 INJECTION INTRAVENOUS at 11:57

## 2023-01-09 RX ADMIN — PHENYLEPHRINE HYDROCHLORIDE 100 MCG: 10 INJECTION INTRAVENOUS at 12:13

## 2023-01-09 RX ADMIN — SODIUM CHLORIDE, POTASSIUM CHLORIDE, SODIUM LACTATE AND CALCIUM CHLORIDE 500 ML: 600; 310; 30; 20 INJECTION, SOLUTION INTRAVENOUS at 04:50

## 2023-01-09 RX ADMIN — VASOPRESSIN 0.5 UNITS: 20 INJECTION INTRAVENOUS at 11:24

## 2023-01-09 RX ADMIN — PHENYLEPHRINE HYDROCHLORIDE 150 MCG: 10 INJECTION INTRAVENOUS at 12:28

## 2023-01-09 RX ADMIN — EPHEDRINE SULFATE 5 MG: 50 INJECTION INTRAMUSCULAR; INTRAVENOUS; SUBCUTANEOUS at 11:22

## 2023-01-09 RX ADMIN — ROCURONIUM BROMIDE 10 MG: 10 INJECTION, SOLUTION INTRAVENOUS at 12:21

## 2023-01-09 RX ADMIN — ROCURONIUM BROMIDE 10 MG: 10 INJECTION, SOLUTION INTRAVENOUS at 11:37

## 2023-01-09 RX ADMIN — EPHEDRINE SULFATE 5 MG: 50 INJECTION INTRAMUSCULAR; INTRAVENOUS; SUBCUTANEOUS at 11:40

## 2023-01-09 RX ADMIN — EPHEDRINE SULFATE 5 MG: 50 INJECTION INTRAVENOUS at 14:05

## 2023-01-09 RX ADMIN — EPHEDRINE SULFATE 5 MG: 50 INJECTION INTRAVENOUS at 14:25

## 2023-01-09 RX ADMIN — PHENYLEPHRINE HYDROCHLORIDE 100 MCG: 10 INJECTION INTRAVENOUS at 12:39

## 2023-01-09 RX ADMIN — ROCURONIUM BROMIDE 30 MG: 10 INJECTION, SOLUTION INTRAVENOUS at 11:07

## 2023-01-09 RX ADMIN — EPHEDRINE SULFATE 5 MG: 50 INJECTION INTRAMUSCULAR; INTRAVENOUS; SUBCUTANEOUS at 11:10

## 2023-01-09 RX ADMIN — EPHEDRINE SULFATE 5 MG: 50 INJECTION INTRAMUSCULAR; INTRAVENOUS; SUBCUTANEOUS at 10:57

## 2023-01-09 RX ADMIN — PIPERACILLIN AND TAZOBACTAM 4.5 G: 4; .5 INJECTION, POWDER, LYOPHILIZED, FOR SOLUTION INTRAVENOUS; PARENTERAL at 20:11

## 2023-01-09 RX ADMIN — SUGAMMADEX 200 MG: 100 INJECTION, SOLUTION INTRAVENOUS at 13:02

## 2023-01-09 RX ADMIN — ROCURONIUM BROMIDE 10 MG: 10 INJECTION, SOLUTION INTRAVENOUS at 11:48

## 2023-01-09 RX ADMIN — CYANOCOBALAMIN TAB 500 MCG 500 MCG: 500 TAB at 05:47

## 2023-01-09 RX ADMIN — DOCUSATE SODIUM 50 MG AND SENNOSIDES 8.6 MG 2 TABLET: 8.6; 5 TABLET, FILM COATED ORAL at 05:47

## 2023-01-09 RX ADMIN — PHENYLEPHRINE HYDROCHLORIDE 100 MCG: 10 INJECTION INTRAVENOUS at 11:49

## 2023-01-09 RX ADMIN — SODIUM CHLORIDE 1000 ML: 9 INJECTION, SOLUTION INTRAVENOUS at 07:58

## 2023-01-09 RX ADMIN — CEFTRIAXONE SODIUM 1000 MG: 10 INJECTION, POWDER, FOR SOLUTION INTRAVENOUS at 16:58

## 2023-01-09 RX ADMIN — OXYCODONE HYDROCHLORIDE 5 MG: 5 TABLET ORAL at 01:46

## 2023-01-09 RX ADMIN — DEXAMETHASONE SODIUM PHOSPHATE 4 MG: 4 INJECTION, SOLUTION INTRA-ARTICULAR; INTRALESIONAL; INTRAMUSCULAR; INTRAVENOUS; SOFT TISSUE at 11:31

## 2023-01-09 RX ADMIN — PHENYLEPHRINE HYDROCHLORIDE 100 MCG: 10 INJECTION INTRAVENOUS at 12:08

## 2023-01-09 RX ADMIN — PHENYLEPHRINE HYDROCHLORIDE 100 MCG: 10 INJECTION INTRAVENOUS at 12:20

## 2023-01-09 RX ADMIN — PROPOFOL 30 MG: 10 INJECTION, EMULSION INTRAVENOUS at 11:10

## 2023-01-09 RX ADMIN — LEVOTHYROXINE SODIUM 50 MCG: 0.05 TABLET ORAL at 05:47

## 2023-01-09 RX ADMIN — PHENYLEPHRINE HYDROCHLORIDE 100 MCG: 10 INJECTION INTRAVENOUS at 12:43

## 2023-01-09 RX ADMIN — PHENYLEPHRINE HYDROCHLORIDE 0.25 MCG/KG/MIN: 10 INJECTION INTRAVENOUS at 14:55

## 2023-01-09 RX ADMIN — PHENYLEPHRINE HYDROCHLORIDE 100 MCG: 10 INJECTION INTRAVENOUS at 13:21

## 2023-01-09 RX ADMIN — FENTANYL CITRATE 50 MCG: 50 INJECTION, SOLUTION INTRAMUSCULAR; INTRAVENOUS at 11:07

## 2023-01-09 RX ADMIN — BUPIVACAINE HYDROCHLORIDE 20 ML: 5 INJECTION, SOLUTION EPIDURAL; INTRACAUDAL; PERINEURAL at 10:58

## 2023-01-09 RX ADMIN — LIDOCAINE HYDROCHLORIDE 60 MG: 20 INJECTION, SOLUTION EPIDURAL; INFILTRATION; INTRACAUDAL at 11:07

## 2023-01-09 RX ADMIN — ROCURONIUM BROMIDE 10 MG: 10 INJECTION, SOLUTION INTRAVENOUS at 12:02

## 2023-01-09 RX ADMIN — SODIUM CHLORIDE: 9 INJECTION, SOLUTION INTRAVENOUS at 10:49

## 2023-01-09 RX ADMIN — CEFAZOLIN 2 G: 330 INJECTION, POWDER, FOR SOLUTION INTRAMUSCULAR; INTRAVENOUS at 11:15

## 2023-01-09 RX ADMIN — EPHEDRINE SULFATE 5 MG: 50 INJECTION INTRAMUSCULAR; INTRAVENOUS; SUBCUTANEOUS at 11:15

## 2023-01-09 ASSESSMENT — PAIN DESCRIPTION - PAIN TYPE
TYPE: ACUTE PAIN
TYPE: ACUTE PAIN

## 2023-01-09 ASSESSMENT — FIBROSIS 4 INDEX: FIB4 SCORE: 0.68

## 2023-01-09 ASSESSMENT — ENCOUNTER SYMPTOMS: FALLS: 1

## 2023-01-09 NOTE — OR SURGEON
Immediate Post OP Note    PreOp Diagnosis: 2 week old type 1 open distal femur fracture right      PostOp Diagnosis: same      Procedure(s):  IRRIGATION AND DEBRIDEMENT, WOUND - Wound Class: Dirty or Infected  INTRAMEDULLARY NAIL FIXATION OF RIGHT FEMUR  WITH CEMENT SUPPLEMENTATION - Wound Class: Dirty or Infected  MULTIPLE DEEP CULTURES SOFT TISSUE AND BONE    Surgeon(s):  LUDWIN East M.D.    Anesthesiologist/Type of Anesthesia:  Anesthesiologist: Aiden Carmichael M.D./General    Surgical Staff:  Circulator: [place] R.N.; Susie Hollins R.N.  Scrub Person: Mary Ellen Abebe    Specimens removed if any:  ID Type Source Tests Collected by Time Destination   1 : distal femur tissue and fluid Other Other AFB CULTURE, FUNGAL CULTURE, AEROBIC/ANAEROBIC CULTURE (SURGERY) Handy Chapa M.D. 1/9/2023 11:35 AM    2 : Distal femur fluid and tissue swab #1 Other Other AFB CULTURE, FUNGAL CULTURE, AEROBIC/ANAEROBIC CULTURE (SURGERY) Handy Chapa M.D. 1/9/2023 11:35 AM    3 : Distal femur tissue and fluid swab #2 Other Other AFB CULTURE, FUNGAL CULTURE, AEROBIC/ANAEROBIC CULTURE (SURGERY) Handy Chapa M.D. 1/9/2023 11:35 AM    4 : Ditstal femur tissue and fluid swab #3 Other Other AFB CULTURE, FUNGAL CULTURE, AEROBIC/ANAEROBIC CULTURE (SURGERY) Handy Chapa M.D. 1/9/2023 11:35 AM        Estimated Blood Loss: 100CCS    Findings: AS DESCRIBED    Complications: NONE        1/9/2023 12:55 PM Handy Chapa M.D.

## 2023-01-09 NOTE — ED NOTES
Med rec updated and complete. Allergies reviewed. Confirmed name and date of birth. Pt denies antibiotic use in last 30 days.      Home pharmacy [facility] [phone]

## 2023-01-09 NOTE — ASSESSMENT & PLAN NOTE
Low levels of albumin and total protein protein consistent  -Dietitian consult  -Consider Ensure s/p surgery

## 2023-01-09 NOTE — CONSULTS
1/9/2023      HPI: Dyana Conway is a 72 y.o. female who presents with complaints of pain to rightt knee after fall during christmas 22..  This started immediately after fall. Patient is a non Ambulator using wheelchair for all activities, her  is her primary care taker.  The pain is minimal/10 . She will be admitted to medicine service for surgical clearance and then to OR for stabilization.      Past Medical History:   Diagnosis Date    Allergy     Microcytic anemia 1/8/2023    Muscle disorder     Septic joint of left knee joint (HCC) 1/8/2023    Urinary tract infection, site not specified        Past Surgical History:   Procedure Laterality Date    ABDOMINAL EXPLORATION         Medications  No current facility-administered medications on file prior to encounter.     Current Outpatient Medications on File Prior to Encounter   Medication Sig Dispense Refill    therapeutic multivitamin-minerals (THERAGRAN-M) Tab Take 1 Tablet by mouth every day.      levothyroxine (SYNTHROID) 50 MCG Tab Take 50 mcg by mouth every morning on an empty stomach.      oxybutynin SR (DITROPAN-XL) 10 MG CR tablet Take 10 mg by mouth 2 times a day.      Cranberry 450 MG Cap Take 450 mg by mouth every day.      cyanocobalamin (VITAMIN B-12) 500 MCG Tab Take 500 mcg by mouth every day.      pyridoxine (VITAMIN B-6) 50 MG Tab Take 50 mg by mouth every day.      Ascorbic Acid (VITAMIN C) 1000 MG Tab Take 1,000 mg by mouth every day.      D-Mannose 500 MG Cap Take 500 mg by mouth every day.      lysine 500 MG Tab Take 500 mg by mouth every day.      vitamin e (VITAMIN E) 400 UNIT CAPS Take 400 Units by mouth every day.         Allergies  Doxycycline, Macrodantin [nitrofurantoin], and Sulfa drugs    ROS  . All other systems were reviewed and found to be negative    Family History   Family history unknown: Yes       Social History     Socioeconomic History    Marital status:    Tobacco Use    Smoking status: Heavy Smoker      "Packs/day: 0.50     Years: 15.00     Pack years: 7.50     Types: Cigarettes   Vaping Use    Vaping Use: Never used   Substance and Sexual Activity    Alcohol use: No    Drug use: Never       Physical Exam  Vitals  BP (!) 89/52   Pulse 83   Temp 36.5 °C (97.7 °F) (Temporal)   Resp 16   Ht 1.676 m (5' 6\")   Wt 59 kg (130 lb)   SpO2 97%   General: Well Developed, Well Nourished, Age appropriate appearance  HEENT: Normocephalic, atraumatic  Psych: Normal mood and affect  Neck: Supple, nontender, no masses  Lungs: Breathing unlabored, No audible wheezing  Heart: Regular heart rate and rhythm  Abdomen: Soft, NT, ND  Neuro: Sensation grossly intact to BUE and BLE, moving all four extremities  Skin: Intact, no open wounds  Vascular: intact, Capillary refill <2 seconds  MSK: open distal femur fracture right NGV intact skin emonstrates small draining wound      Radiographs:  CT-FOREIGN FILM CAT SCAN   Final Result      DX-CHEST-PORTABLE (1 VIEW)   Final Result         1. No acute cardiopulmonary abnormalities are identified.      CT-KNEE W/O PLUS RECONS RIGHT   Final Result         1. There is an open wound in the anterior aspect with small amount of gas.      2. Subacute significant displays comminuted fracture of the distal femur with multiple displaced fragments.      3. Significant fluid and heterotopic calcification seen in the lateral aspect of the distal femoral fracture with rarefaction of the bones. This could reflects subacute nature of the fracture with superimposed infection/osteomyelitis given the open    wound. Underlying lytic mass cannot be excluded. MRI with contrast may be helpful for further evaluation.      CT-CSPINE WITHOUT PLUS RECONS   Final Result         1. No acute fracture from C1 through T1 is visualized.         CT-HEAD W/O   Final Result         1. No acute intracranial abnormality. No evidence of acute intracranial hemorrhage or mass lesion.                     DX-KNEE 2- LEFT    (Results " Pending)   DX-PORTABLE FLUORO > 1 HOUR    (Results Pending)       Laboratory Values  Recent Labs     01/08/23 2016 01/09/23  0311   WBC 14.9* 12.4*   RBC 2.65* 2.49*   HEMOGLOBIN 8.3* 7.8*   HEMATOCRIT 27.5* 25.9*   .8* 104.0*   MCH 31.3 31.3   MCHC 30.2* 30.1*   RDW 69.6* 69.2*   PLATELETCT 460* 504*   MPV 8.8* 8.8*     Recent Labs     01/08/23 2016 01/09/23 0311   SODIUM 139 138   POTASSIUM 4.1 4.1   CHLORIDE 111 109   CO2 19* 21   GLUCOSE 88 99   BUN 18 17     Recent Labs     01/08/23 2016   APTT 32.0   INR 1.02         Impression:open femur fracture right in wheelchair ambuator, secondary to childhood disease    Plan:We discussed the diagnosis and findings with the patient at length.  We reviewed possible non operative and operative interventions and the risks and benefits of each of these.  she had a chance to ask questions and all of these were answered to her satisfaction. The patient chose to proceed with  operative intervention. Risks and benefits of surgery were discussed which include but are not limited to bleeding, infection, neurovascular damage, malunion, nonunion, instability, limb length discrepancy, DVT, PE, MI, Stroke and death. They understand these risks and wish to proceed.

## 2023-01-09 NOTE — ANESTHESIA PROCEDURE NOTES
Peripheral Block    Date/Time: 1/9/2023 10:58 AM  Performed by: Aiden Carmichael M.D.  Authorized by: Aiden Carmichael M.D.     Patient Location:  OR  Start Time:  1/9/2023 10:58 AM  End Time:  1/9/2023 11:03 AM  Reason for Block: at surgeon's request and post-op pain management ONLY    patient identified, IV checked, site marked, risks and benefits discussed, surgical consent, monitors and equipment checked, pre-op evaluation and timeout performed    Patient Position:  Supine  Prep: ChloraPrep    Monitoring:  Heart rate, continuous pulse ox and cardiac monitor  Block Region:  Lower Extremity  Lower Extremity - Block Type:  FEMORAL nerve block, Infra-Inguinal approach    Laterality:  Right  Procedures: ultrasound guided  Image captured, interpreted and electronically stored.  Local Infiltration:  Lidocaine  Strength:  1 %  Dose:  3 ml  Block Type:  Single-shot  Needle Length:  100mm  Needle Gauge:  21 G  Needle Localization:  Ultrasound guidance  Injection Assessment:  Negative aspiration for heme, no paresthesia on injection, incremental injection and local visualized surrounding nerve on ultrasound   US Guided Femoral Nerve Block:   US probe placed at inguinal crease and the Femoral Nerve (FN) identified lateral to the Femoral Artery (FA).  Needle inserted lateral to probe in an in plane approach and advanced under direct visualization through the fascia julia and fascia iliaca remaining lateral to the FN.  After negative aspiration LA injected with ease and visualized surrounding the FN.

## 2023-01-09 NOTE — ED NOTES
Pt resting, airway patent, rr even and unlabored, equal chest rise and fall. NAD noted. Call light within reach.

## 2023-01-09 NOTE — PROGRESS NOTES
Hospital Medicine Daily Progress Note    Date of Service  1/9/2023    Chief Complaint  Dyana Conway is a 72 y.o. 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 Patient was noted to be septic on admission. CT right knee revealed right continue #1 have start and stop time for that wound is a 72 anterior aspect of the right knee with swelling against, subacute care and Medicare comminuted fracture of distal femur with multiple displaced fragments and  changes concerning of 6 osteomyelitis diabetic lateral aspect of distal femur.  Patient received with the patient yesterday IV fluid bolus per sepsis protocol and was started on IV vancomycin and IV ceftriaxone      Interval Problem Update  Patient underwent I&D of wound and intramedullary nail fixation of right femur with cement.  Multiple deep cultures of soft tissue and wound were obtained.  Patient received 2.5 L of fluid intraoperatively however she remained hypotensive postprocedure.  Patient appears pale.  She has been started on IV vasopressor therapy.  I have consulted intensivist Dr Bansal and she will be transferred to the ICU for higher level of care.  Check CBC, BMP and lactic acid.    I have discussed this patient's plan of care and discharge plan at IDT rounds today with Case Management, Nursing, Nursing leadership, and other members of the IDT team.    Consultants/Specialty  critical care and orthopedics    Code Status  Full Code    Disposition  Patient is not medically cleared for discharge.   Anticipate discharge to to home with close outpatient follow-up.  I have placed the appropriate orders for post-discharge needs.    Review of Systems  Review of Systems   Constitutional:  Positive for malaise/fatigue.   Musculoskeletal:  Positive for falls and joint  pain.      Physical Exam  Temp:  [36.2 °C (97.1 °F)-36.5 °C (97.7 °F)] 36.2 °C (97.1 °F)  Pulse:  [70-99] 73  Resp:  [15-22] 18  BP: ()/(44-68) 76/50  SpO2:  [87 %-100 %] 97 %    Physical Exam  Vitals and nursing note reviewed.   Constitutional:       General: She is not in acute distress.     Appearance: Normal appearance.   HENT:      Head: Normocephalic and atraumatic.      Nose: Nose normal.      Mouth/Throat:      Mouth: Mucous membranes are dry.   Eyes:      Extraocular Movements: Extraocular movements intact.      Conjunctiva/sclera: Conjunctivae normal.      Pupils: Pupils are equal, round, and reactive to light.   Cardiovascular:      Rate and Rhythm: Normal rate and regular rhythm.      Pulses: Normal pulses.      Heart sounds: Normal heart sounds.   Pulmonary:      Effort: No respiratory distress.      Breath sounds: No wheezing, rhonchi or rales.   Abdominal:      General: Bowel sounds are normal. There is no distension.      Palpations: Abdomen is soft.      Tenderness: There is no abdominal tenderness.   Musculoskeletal:         General: Swelling and tenderness present.      Cervical back: Normal range of motion and neck supple.   Lymphadenopathy:      Cervical: No cervical adenopathy.   Skin:     Coloration: Skin is pale. Skin is not jaundiced.      Findings: No rash.   Neurological:      Mental Status: She is alert and oriented to person, place, and time.      Cranial Nerves: No cranial nerve deficit.      Motor: No weakness.   Psychiatric:         Mood and Affect: Mood normal.         Behavior: Behavior normal.       Fluids    Intake/Output Summary (Last 24 hours) at 1/9/2023 1509  Last data filed at 1/9/2023 1318  Gross per 24 hour   Intake 4600 ml   Output 875 ml   Net 3725 ml       Laboratory  Recent Labs     01/08/23  2016 01/09/23  0311   WBC 14.9* 12.4*   RBC 2.65* 2.49*   HEMOGLOBIN 8.3* 7.8*   HEMATOCRIT 27.5* 25.9*   .8* 104.0*   MCH 31.3 31.3   MCHC 30.2* 30.1*   RDW 69.6*  69.2*   PLATELETCT 460* 504*   MPV 8.8* 8.8*     Recent Labs     01/08/23 2016 01/09/23  0311   SODIUM 139 138   POTASSIUM 4.1 4.1   CHLORIDE 111 109   CO2 19* 21   GLUCOSE 88 99   BUN 18 17   CREATININE 0.40* 0.42*   CALCIUM 6.9* 8.1*     Recent Labs     01/08/23 2016   APTT 32.0   INR 1.02               Imaging  DX-PORTABLE FLUORO > 1 HOUR   Preliminary Result      Portable fluoroscopy utilized for 1 minute 42 seconds.         INTERPRETING LOCATION: [address] [place] [address], [place], [id number]      DX-FEMUR-2+ RIGHT   Final Result      Portable intraoperative imaging with findings as described above.      CT-FOREIGN FILM CAT SCAN   Final Result      DX-CHEST-PORTABLE (1 VIEW)   Final Result         1. No acute cardiopulmonary abnormalities are identified.      CT-KNEE W/O PLUS RECONS RIGHT   Final Result         1. There is an open wound in the anterior aspect with small amount of gas.      2. Subacute significant displays comminuted fracture of the distal femur with multiple displaced fragments.      3. Significant fluid and heterotopic calcification seen in the lateral aspect of the distal femoral fracture with rarefaction of the bones. This could reflects subacute nature of the fracture with superimposed infection/osteomyelitis given the open    wound. Underlying lytic mass cannot be excluded. MRI with contrast may be helpful for further evaluation.      CT-CSPINE WITHOUT PLUS RECONS   Final Result         1. No acute fracture from C1 through T1 is visualized.         CT-HEAD W/O   Final Result         1. No acute intracranial abnormality. No evidence of acute intracranial hemorrhage or mass lesion.                     DX-FEMUR-2+ RIGHT    (Results Pending)        Assessment/Plan  * Septic shock (HCC)- (present on admission)  Assessment & Plan  This is Sepsis Present on admission  SIRS criteria identified on my evaluation include: Tachycardia, with heart rate greater than 90 BPM and Leukocytosis, with WBC greater than  12,000  Source is R knee  Sepsis protocol initiated  Fluid resuscitation ordered per protocol  Crystalloid Fluid Administration: Fluid resuscitation ordered per standard protocol - 30 mL/kg per current or ideal body weight  IV antibiotics as appropriate for source of sepsis  Reassessment: I have reassessed the patient's hemodynamic status  Started on IV Vasopressors, titrate to MAP > 65  Critical care consulted           Septic joint of left knee joint (HCC)- (present on admission)  Assessment & Plan  Started on IV Vancomycin and IV Ceftriaxone  S/p I&D  Follow cultures      Anemia- (present on admission)  Assessment & Plan  Hb 7.8 on admission  Check iron studies, folate, B12 and TSH  Monitor CBC, transfuse for hemoglobin less than 7      Mild protein-calorie malnutrition (HCC)- (present on admission)  Assessment & Plan  Nutrition consult         VTE prophylaxis: SCDs/TEDs    I have performed a physical exam and reviewed and updated ROS and Plan today (1/9/2023). In review of yesterday's note (1/8/2023), there are no changes except as documented above.

## 2023-01-09 NOTE — OP REPORT
DATE OF SERVICE:  01/09/2023     PREOPERATIVE DIAGNOSIS:  Two-week-old right type I distal femur fracture.     POSTOPERATIVE DIAGNOSIS:  Two-week-old right type I distal femur fracture.     OPERATIONS PERFORMED:  1.  Wound debridement with multiple soft tissue and bone cultures.  2.  Retrograde intramedullary femoral fixation with cement supplementation.     COMPLICATIONS:  None.     SURGEON:  Handy Chapa MD     ASSISTANT:  Loyd Atkinson MD     INDICATIONS FOR THE OPERATION:  Delayed presentation of open femur fracture in   a geriatric patient with severe osteoporosis.     PREOPERATIVE CONSENT AND FAMILY CONFERENCE:  The patient was seen today   preoperatively.  Risks, benefits, and alternatives were explained.  The   patient sent for surgical undertaking, answered all of her questions to her   satisfaction.     BLOOD LOSS:  100 mL     MEDICATIONS UTILIZED:  Ancef perioperatively, tobramycin bone cement, and   [place] retrograde locked nail.     DESCRIPTION OF PROCEDURE:  The patient brought to the operating room awake,   alert, placed on the operating table in supine position.  OSI table was   utilized.  No tourniquet, timeout, and the operation began.     The puncture wound on the anterolateral aspect of the femur was then extended   distally to Gerdy's tubercle into the distal third of the femur to get enough   exposure for the fracture fixation and reduction and cement application.  We   took a great deal of time to debride the wound and removed.  We removed all   nonviable fracture fragments.  There was no evidence of pus or necrotic   tissue, simply opened soft tissue with evidence of environmental exposure.     We now irrigated with 3 liters of pulse mechanical irrigation.  We had   previously taken multiple cultures.     Now our attention was turned to the reduction.  Using combination of reduction   instrumentation and K-wires, we were able to nearly anatomically reduce the   distal femur using a  metal A frame and fluoroscopy.     At this point, we were satisfied, we began the instrumentation.  Using a   retrograde reamers, we reamed to a 15 and chose a 13.5 intramedullary nail,   340 mm in length. Prior to passing the nail, we placed locking screws in the   distal third of the femur to help stabilize the reduction.     At this point, we tapped the nail up into its anatomic position and using nail   mounting targeting device distally, we were able to pass 4 screws through the   condyles and into the nail, which represented excellent opportunity for   supplemental fixation with the bone cement.     Now, we locked the nail with a single anterior posterior screw using freehand   technique proximally.  We copiously irrigated again prior to placement of   cement.  Now, we used 2 bags of methylmethacrylate with tobramycin, allowed it   to dry into a malleable form, and impacted into the defect that was created   with the fixation.     We allowed the cement to dry, copiously irrigated, checked our reduction, and   our position of the instrumentation. We were satisfied, both AP and lateral,   documentation for permanent radiographs.     Now, we closed the wound with a PDS 2-0 and then a subcuticular stitch.  No   Steri-Strips or wound VAC were utilized.  A sterile compression dressing and   the patient was then taken to recovery room in stable condition.  No   intraoperative or immediate postoperative complications.  The patient   tolerated the procedure well.     Postoperatively, we will now need to wait for the cultures to come back to   make sure that she does not have evidence of osteo.  She has been on IV   antibiotics.  Most likely she will require long-term antibiotic suppression.     In terms of her activities, she is a non-ambulator.  She lives with a disabled   .  She takes care of herself for the most part in her wheelchair.  Her   home has been now fitted for wheelchair activities for several  years due to   her transverse myelitis as a kid.  The patient can be discharged when stable   and after our antibiotic cultures come back and a long-term antibiotic plan is   created.        ______________________________  MD LUIS ALBERTO Simpson/HUNG    DD:  01/09/2023 13:04  DT:  01/09/2023 15:30    Job#:  [id number]

## 2023-01-09 NOTE — ED NOTES
Pt resting, aox4, airway patent, rr even and unlabored, equal chest rise and fall. NAD noted. Rails up times two, call light within reach.

## 2023-01-09 NOTE — ED NOTES
Right knee wound dressing removed, large amount of drainage serosanguinous . Changed dressing,repositioned for comfort

## 2023-01-09 NOTE — ANESTHESIA PREPROCEDURE EVALUATION
Case: [id number] Date/Time: 01/09/23 1007    Procedures:       APPLICATION, EXTERNAL FIXATION DEVICE (Left: Thigh) - IRRIGATION AND DEBRIDEMENT WITH PLACEMENT EX-FIX LEFT DISTAL FEMUR. SURGEON REQUESTS C-ARM, FLAT OSI, PULSEVAC/3L NACL      IRRIGATION AND DEBRIDEMENT, WOUND (Left)    Location: [facility] OR 16 / SURGERY [facility]    Surgeons: Handy Chapa M.D.        73 yo F w/ paraplegia s/t transverse myelitis, hypothyroidism, and L knee septic arthritis  Relevant Problems   Other   (positive) Septic joint of left knee joint (HCC)       Physical Exam    Airway   Mallampati: II  TM distance: >3 FB  Neck ROM: full       Cardiovascular - normal exam  Rhythm: regular  Rate: normal  (-) murmur     Dental - normal exam           Pulmonary - normal exam  Breath sounds clear to auscultation     Abdominal    Neurological              Anesthesia Plan    ASA 2       Plan - general and peripheral nerve block     Peripheral nerve block will be post-op pain control  Airway plan will be ETT          Induction: intravenous    Postoperative Plan: Postoperative administration of opioids is intended.    Pertinent diagnostic labs and testing reviewed    Informed Consent:    Anesthetic plan and risks discussed with patient.    Use of blood products discussed with: patient whom consented to blood products.

## 2023-01-09 NOTE — PROGRESS NOTES
"Pharmacy Vancomycin Kinetics Note for 1/8/2023     72 y.o. female on Vancomycin day # 1     Vancomycin Indication (AUC Dosing): Osteomyelitis    Provider specified end date: 01/15/23    Active Antibiotics (From admission, onward)      Ordered     Ordering Provider       Radha Jan 8, 2023 10:45 PM    01/08/23 2245  vancomycin (VANCOCIN) 1,000 mg in  mL IVPB  (vancomycin (VANCOCIN) IV (LD + Maintenance))  EVERY 24 HOURS         LUDWIN Trinh Jan 8, 2023 10:36 PM    01/08/23 2236  cefTRIAXone (Rocephin) syringe 1,000 mg  EVERY 24 HOURS         Gabi Byrne M.D.    01/08/23 2236  MD Alert...Vancomycin per Pharmacy  PHARMACY TO DOSE        Question:  Indication(s) for vancomycin?  Answer:  Osteomyelitis    LUDWIN Trinh Jan 8, 2023  9:46 PM    01/08/23 2146  vancomycin (VANCOCIN) 1,500 mg in  mL IVPB  (vancomycin (VANCOCIN) IV (LD + Maintenance))  ONCE         LUDWIN Carlos Jan 8, 2023  9:23 PM    01/08/23 2123  ceFAZolin (Ancef) 2 g in  mL IVPB  ONCE         Aiden Leon M.D.            Dosing Weight: 59 kg (130 lb 1.1 oz)      Admission History: Admitted on 1/8/2023 for Sepsis (HCC) [A41.9]  Pertinent history: Pt presents following GLF from . CT of knee shows open wound with small amount of gas, concerning for osteomyelitis. Orthopedic surgery consulting, broad spectrum antibiotics initiated in anticipation of surgery.    Allergies:     Doxycycline, Macrodantin [nitrofurantoin], and Sulfa drugs     Pertinent cultures to date:     Results       Procedure Component Value Units Date/Time    BLOOD CULTURE [[id number]] Collected: 01/08/23 2149    Order Status: Sent Specimen: Blood from Peripheral Updated: 01/08/23 2308    Narrative:      Per Hospital Policy: Only change Specimen Src: to \"Line\" if  specified by physician order.    MRSA By PCR (Amp) [[id number]]     Order Status: Sent Specimen: Respirate from Nares     BLOOD CULTURE [[id number]] " "Collected: 23    Order Status: Sent Specimen: Blood from Peripheral Updated: 23    Narrative:      Per Hospital Policy: Only change Specimen Src: to \"Line\" if  specified by physician order.    COV-2, FLU A/B, AND RSV BY PCR (2-4 HOURS CEPHEID): Collect NP swab in [facility] [[id number]]     Order Status: Completed Specimen: Respirate             Labs:     Estimated Creatinine Clearance: 121.9 mL/min (A) (by C-G formula based on SCr of 0.4 mg/dL (L)).  Recent Labs     23   WBC 14.9*     Recent Labs     23   BUN 18   CREATININE 0.40*   ALBUMIN 2.7*       Intake/Output Summary (Last 24 hours) at 2023 2315  Last data filed at 2023 2300  Gross per 24 hour   Intake 100 ml   Output 0 ml   Net 100 ml      BP (!) 87/54   Pulse 76   Temp 36.4 °C (97.6 °F)   Resp (!) 22   Ht 1.676 m (5' 6\")   Wt 59 kg (130 lb)   SpO2 100%  Temp (24hrs), Av.4 °C (97.6 °F), Min:36.4 °C (97.6 °F), Max:36.4 °C (97.6 °F)      List concerns for Vancomycin clearance:     Malnutrition/Low albumin;Age    Pharmacokinetics:     AUC kinetics:   Ke (hr ^-1): 0.0617 hr^-1  Half life: 11.23 hr  Clearance: 2.366  Estimated TDD: 1183  Estimated Dose: 651  Estimated interval: 13.2    A/P:     -  Vancomycin dose: 1000 mg IV q24h ()    -  Next vancomycin level(s):    - Will be ordered by floor pharmacist if necessary    -  Predicted vancomycin AUC from initial AUC test calculator: 423 mg·hr/L    -  Comments: vancomycin and Rocephin initiated for suspected osteomyelitis of knee. Little concern for accumulation. MRSA PCR pending, de-escalation recommended as indicated. Pharmacy will continue to follow.    Justyn Ortega, PharmD  "

## 2023-01-09 NOTE — ANESTHESIA POSTPROCEDURE EVALUATION
Patient: Dyana Conway    Procedure Summary     Date: 01/09/23 Room / Location: [place] [id number] / SURGERY [facility]    Anesthesia Start: 1049 Anesthesia Stop: 1353    Procedures:       IRRIGATION AND DEBRIDEMENT, WOUND (Right: Knee)      INTRAMEDULLARY NAIL FIXATION OF RIGHT FEMUR  WITH CEMENT SUPPLEMENTATION (Right: Knee) Diagnosis: (OPEN FEMUR FRACTURE, RIGHT)    Surgeons: Handy Chapa M.D. Responsible Provider: Aiden Carmichael M.D.    Anesthesia Type: general, peripheral nerve block ASA Status: 2          Final Anesthesia Type: general, peripheral nerve block  Last vitals  BP   Blood Pressure : (!) 83/44    Temp   36.2 °C (97.1 °F)    Pulse   73   Resp   16    SpO2   97 %      Anesthesia Post Evaluation    Patient location during evaluation: PACU  Patient participation: complete - patient participated  Level of consciousness: confused and sleepy but conscious    Airway patency: patent  Anesthetic complications: no  Cardiovascular status: hypotensive (MAP back to preop baseline ~60)  Respiratory status: acceptable and face mask  Hydration status: euvolemic    PONV: none          No notable events documented.

## 2023-01-09 NOTE — ASSESSMENT & PLAN NOTE
In the setting of an open comminuted fracture of distal femoral.  Of note, initial trauma on 12/22/2022 did not result in open fracture.  Rather, the drainage is likely secondary to sinus track of soft tissue in the setting of infected joint and likely osteomyelitis.  CT knee results consistent.  -Status post cefazolin in ED  -Start ceftriaxone and vancomycin in the setting of open wound and hypotension present.  -Fluids per sepsis protocol  -Dr. Chapa with orthopedic surgery was consulted by ERP  -N.p.o. at midnight in anticipation of I&D  -Wound cultures ordered but ER/[place] pending

## 2023-01-09 NOTE — PROGRESS NOTES
Ortho Staff Note  Aware of admission  Formal consult to follow  Subacute open distal femur fracture, osteoporotic with multipe medical problems  Will need OR when time available, debridement, possible external fixation  Dr. Morales on tomorrow, will discuss  Chapa

## 2023-01-09 NOTE — ED NOTES
Dr. Byrne notified of pt blood pressure of 78/48. MD also notified of no urine output since hospital stay. New orders for bladder scan and 500 cc bolus of fluids.

## 2023-01-09 NOTE — ASSESSMENT & PLAN NOTE
Likely secondary to B12 deficiency.  Patient is currently receiving B12 supplementations by primary physician.  -Continue B12 supplementation

## 2023-01-09 NOTE — ED NOTES
Trf patient to hospital bed  Repositioned for comfort, ivf bolus infusing  Provided with phone to call  for update.

## 2023-01-09 NOTE — ASSESSMENT & PLAN NOTE
Clinically remains stable, surgical site appears fine, afebrile, but WBC is rising even while tapering steroids  Need to monitor this closely  I personally reviewed the CBC on 1/16  Wound cultures showing Amp sensitive EColi and Enterococcus  Reviewed with ID who recommend amp/sulbactam here in house with transition to Amox/Clav for a total of 6wks from I&D: stop date 2/20

## 2023-01-09 NOTE — ED TRIAGE NOTES
"Chief Complaint   Patient presents with    Trauma Green     Transfer from [facility]     Patient BIB [facility] EMS form [facility] as a trauma green transfer.  Patient fell out of her WC 3 weeks ago after becoming lightheaded and dizzy. Patient does not recall the fall but denies LOC. Patient is WC bound at baseline.     Bilateral PIV established PTA.     Patient was given the following medications at the sending facility and by EMS PTA.    1 gm Ancef  1 L NS  50 mcg Fentanyl x2 for a totla of 100 mcg  4 mg Zofran    BP 96/60   Pulse 86   Temp 36.4 °C (97.6 °F)   Resp 18   Ht 1.676 m (5' 6\")   Wt 59 kg (130 lb)   SpO2 91%    "

## 2023-01-09 NOTE — H&P
Date of Admission: 1/8/2023  Admission Status: Inpatient  Attending: Matthew Ye M.D.   Senior Resident: Dr. LIYAH Byrne MD  Contact Number: [phone]    Chief Complaint:   Bleeding from left knee    History of Present Illness (HPI):   Dyana is a 72 y.o. 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  Since then her knee remained swollen with some pain.  On morning of presentation 1/8/2023 she noticed thin red fluid draining from a wound in her knee and called EMS.     Review of Systems:   Patient denies fevers, chills, night sweats, weight loss, weight gain, vision changes, double vision, ear pain, runny nose, sore throat, sinus pain, trouble swallowing, chest pain, palpitation, orthopnea, PND, swelling, shortness of breath, wheezing, cough, mucus production, hemoptysis, abdominal pain, nausea, vomiting, diarrhea, constipation, melena, reduction of urinary stream, dysuria, hematuria, muscle pain, joint pain, rash, itching, lymphadenopathy, dizziness, headache, weakness, numbness, polyuria, polydipsia, heat intolerance, cold intolerance, depression, anxiety, suicidal ideation.      Past Medical History:   Past Medical History was reviewed with patient.   has a past medical history of Allergy, Muscle disorder, and Urinary tract infection, site not specified.    She has no past medical history of Addisons disease ([facility]), Adrenal disorder ([facility]), Anemia, Anxiety, Arrhythmia, Arthritis, Asthma, Blood transfusion without reported diagnosis, Cancer ([facility]), Cataract, CHF (congestive heart failure) ([facility]), Clotting disorder ([facility]), COPD (chronic obstructive pulmonary disease) ([facility]), Cushings syndrome ([facility]), Depression, Diabetes ([facility]), Diabetic neuropathy ([facility]), GERD (gastroesophageal reflux disease), Glaucoma, Goiter, Headache(784.0), Heart attack ([facility]), Heart murmur,  HIV (human immunodeficiency virus infection) ([facility]), Hyperlipidemia, Hypertension, IBD (inflammatory bowel disease), Kidney disease, Meningitis, Migraine, Osteoporosis, Parathyroid disorder ([facility]), Pituitary disease ([facility]), Seizure ([facility]), Sickle cell disease ([facility]), Stroke ([facility]), Substance abuse ([facility]), Tuberculosis, or Ulcer.    Past Surgical History: Past Surgical History was reviewed with patient.   has a past surgical history that includes abdominal exploration.    Medications: Medications have been reviewed with patient.  Prior to Admission Medications   Prescriptions Last Dose Informant Patient Reported? Taking?   Ascorbic Acid (VITAMIN C) 1000 MG Tab 1/8/2023 at 0900 Patient Yes Yes   Sig: Take 1,000 mg by mouth every day.   Cranberry 450 MG Cap 1/8/2023 at 0900 Patient Yes Yes   Sig: Take 450 mg by mouth every day.   D-Mannose 500 MG Cap 1/8/2023 at 0900 Patient Yes Yes   Sig: Take 500 mg by mouth every day.   cyanocobalamin (VITAMIN B-12) 500 MCG Tab 1/8/2023 at 0900 Patient Yes Yes   Sig: Take 500 mcg by mouth every day.   levothyroxine (SYNTHROID) 50 MCG Tab 1/8/2023 at 0800 Patient Yes Yes   Sig: Take 50 mcg by mouth every morning on an empty stomach.   lysine 500 MG Tab 1/8/2023 at 0900 Patient Yes Yes   Sig: Take 500 mg by mouth every day.   oxybutynin SR (DITROPAN-XL) 10 MG CR tablet 1/8/2023 at 0900 Patient Yes Yes   Sig: Take 10 mg by mouth 2 times a day.   pyridoxine (VITAMIN B-6) 50 MG Tab 1/8/2023 at 0900 Patient Yes Yes   Sig: Take 50 mg by mouth every day.   therapeutic multivitamin-minerals (THERAGRAN-M) Tab 1/8/2023 at 0900 Patient Yes Yes   Sig: Take 1 Tablet by mouth every day.   vitamin e (VITAMIN E) 400 UNIT CAPS 1/8/2023 at 0900 Patient Yes No   Sig: Take 400 Units by mouth every day.      Facility-Administered Medications: None        Allergies: Allergies have been reviewed with patient.  Allergies   Allergen Reactions    Doxycycline Rash     median    Macrodantin [Nitrofurantoin]  Unspecified     Unknown    Sulfa Drugs Hives       Family History:   Family history was discussed with the patient.  However due to the traumatic nature of this wound, it is largely noncontributory.  There is no family history of immunity dysregulation in the family..    Social History:   Home: Lives with   Education/occupation: Retired  Activity: Wheelchair dependent  Drugs/tobacco/alcohol: Denies  Sex: Inactive  Suicidality/homocidality: Denies  Recent travel: Denies    Primary Care Provider:  MIGUEL Gardiner    Physical Exam:     Vitals:  Temp:  [36.4 °C (97.6 °F)] 36.4 °C (97.6 °F)  Pulse:  [76-88] 76  Resp:  [18-22] 22  BP: ()/(54-61) 87/54  SpO2:  [87 %-100 %] 100 %    General: Elderly woman laying in bed in no acute distress  HEENT: NC/AT.  PERRLA, EOMI.  External ear normal.  Nares patent, nonedematous nonerythematous.  Moist mucous membranes. Good dentition.  Trachea midline.   Neck: No JVP.  Carotid bruit could not be appreciated.  Nontender, nonnodular thyroid.  No anterior or posterior cervical, occipital, supraclavicular lymphadenopathy  Cardiovascular: PMI<2 cm at fifth costal space at midclavicular line.  No heaves appreciated.  No thrills.  RRR W/O M, R, G.  Pulmonary: Normal work of breathing.  Resonant to percussion.  CTAB, no wheezes, crackles, rhonchi heard in 10 lung fields  Abdomen: Flat, soft, nondistended.  Normoactive bowel sounds.  Nontender to percussion or palpation.  No hepatosplenomegaly noted.  No fluid wave  Musculoskeletal: Left knee is edematous and erythematous.  Large pocket of effusion noted.  2 cm wound on the anterolateral aspect of left knee is draining large amounts of blood tinged thin fluid.  Granulation tissue present in wound site.  Skin: Warm and dry.  No rashes, bruises or lacerations noted  Neuro: Alert and oriented X4.  CN II-XII checked and intact.  5 out of 5 strength throughout.  DTR 2+ throughout.  FTN, HTS intact.  Sensation intact .  negative Romberg.  Lymphatic: No edema or lymphadenopathy noted.  Psychiatric: Good mood congruent affect.  Thought form linear, content appropriate      Labs:         Imaging:   CT C-spine: No acute abnormality  CT head without contrast no acute abnormality  CT knee without CST: Remarkable for open wound with small amount of gas.  Subacute significant comminuted fracture of distal femur with multiple displaced fragments present.  Significant fluid and heterotopic calcification of lateral aspect of distal femoral fracture, osteomyelitis likely.    Previous Data Review: reviewed    Assessment and Plan  Dyana is a 72 y.o. 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     I anticipate 2 or more midnights will be required in order to return this patient to his baseline health and arrange for appropriate outpatient follow-up for his myriad of conditions.    * Sepsis (HCC)- (present on admission)  Assessment & Plan  This is Sepsis Present on admission  SIRS criteria identified on my evaluation include: Tachycardia, with heart rate greater than 90 BPM and Leukocytosis, with WBC greater than 12,000  Source is Infected left knee   Sepsis protocol initiated  Fluid resuscitation ordered per protocol  Crystalloid Fluid Administration: Fluid resuscitation ordered per standard protocol - 30 mL/kg per current or ideal body weight  IV antibiotics as appropriate for source of sepsis  Reassessment: I have reassessed the patient's hemodynamic status          Hypocalcemia- (present on admission)  Assessment & Plan  Iv CALCIUM, 2G ONCE    Neuropathic bladder  Assessment & Plan  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  Patient is currently receiving B12 supplementations by primary physician.  -Continue B12 supplementation    Septic joint of left knee joint (HCC)- (present on admission)  Assessment & Plan  In the setting of an open comminuted fracture of distal femoral.  Of note, initial trauma on 12/22/2022 did not result in open fracture.  Rather, the drainage is likely secondary to sinus track of soft tissue in the setting of infected joint and likely osteomyelitis.  CT knee results consistent.  -Status post cefazolin in ED  -Start ceftriaxone and vancomycin in the setting of open wound and hypotension present.  -Fluids per sepsis protocol  -Dr. Chapa with orthopedic surgery was consulted by ERP  -N.p.o. at midnight in anticipation of I&D  -Wound cultures ordered but ER/[place] pending

## 2023-01-09 NOTE — ANESTHESIA PROCEDURE NOTES
Airway    Date/Time: 1/9/2023 11:13 AM  Performed by: Aiden Carmichael M.D.  Authorized by: Aiden Carmichael M.D.     Location:  OR  Urgency:  Elective  Indications for Airway Management:  Anesthesia      Spontaneous Ventilation: absent    Sedation Level:  Deep  Preoxygenated: Yes    Patient Position:  Sniffing  Mask Difficulty Assessment:  1 - vent by mask  Final Airway Type:  Endotracheal airway  Final Endotracheal Airway:  ETT  Cuffed: Yes    Technique Used for Successful ETT Placement:  Direct laryngoscopy  Devices/Methods Used in Placement:  Anterior pressure/BURP    Insertion Site:  Oral  Blade Type:  Glide  Laryngoscope Blade/Videolaryngoscope Blade Size:  3  ETT Size (mm):  7.0  Measured from:  Teeth  ETT to Teeth (cm):  20  Placement Verified by: auscultation and capnometry    Cormack-Lehane Classification:  Grade I - full view of glottis  Number of Attempts at Approach:  1   Limited neck extension necessitating glidescope

## 2023-01-09 NOTE — ED PROVIDER NOTES
ER Provider Note    Scribed for Matthew Ye M.d. by Dash Damon. 1/8/2023  8:29 PM    Primary Care Provider: MIGUEL Gardiner  Means of arrival: EMS  History obtained from: EMT    CHIEF COMPLAINT  Chief Complaint   Patient presents with    Trauma Green     Transfer from [facility]     LIMITATION TO HISTORY   Select: Intoxication    HPI  OUTSIDE HISTORIAN(S):  Select: EMS EMT    EXTERNAL RECORDS REVIEWED  Select: Other The patient was evaluated for dermatitis.     Dayna Conway is a 72 y.o. female who presents to the ED for a ground level fall onset three weeks ago. Per EMT, three weeks ago, she fell out of her wheelchair in the kitchen onto hardwood floor with her foot caught between a stove an an island on the kitchen. However, she did not notice any pain because she has decreased sensation in her legs at baseline. Last night, she noticed bleeding on her right knee. Upon evaluation at the hospital, she was told she has a fracture on her knee. Per patient, she has not been able to walk for three years. She has associated head injury, bleeding to right knee, and swelling to right knee,  but denies loss of consciousness. No alleviating or exacerbating factors reported.     ROS  Pertinent positives include head injury, bleeding to right knee, and swelling to right knee. Pertinent negatives include no loss of consciousness.  All other systems reviewed and negative.      PAST MEDICAL HISTORY  Past Medical History:   Diagnosis Date    Allergy     Microcytic anemia 1/8/2023    Muscle disorder     Septic joint of left knee joint (HCC) 1/8/2023    Urinary tract infection, site not specified        SURGICAL HISTORY  Past Surgical History:   Procedure Laterality Date    ABDOMINAL EXPLORATION         FAMILY HISTORY  Family History   Family history unknown: Yes       SOCIAL HISTORY   reports that she has been smoking cigarettes. She has a 7.50 pack-year smoking history. She does not have any smokeless  "tobacco history on file. She reports that she does not drink alcohol and does not use drugs.    CURRENT MEDICATIONS  Previous Medications    ASCORBIC ACID (VITAMIN C) 1000 MG TAB    Take 1,000 mg by mouth every day.    CRANBERRY 450 MG CAP    Take 450 mg by mouth every day.    CYANOCOBALAMIN (VITAMIN B-12) 500 MCG TAB    Take 500 mcg by mouth every day.    D-MANNOSE 500 MG CAP    Take 500 mg by mouth every day.    LEVOTHYROXINE (SYNTHROID) 50 MCG TAB    Take 50 mcg by mouth every morning on an empty stomach.    LYSINE 500 MG TAB    Take 500 mg by mouth every day.    OXYBUTYNIN SR (DITROPAN-XL) 10 MG CR TABLET    Take 10 mg by mouth 2 times a day.    PYRIDOXINE (VITAMIN B-6) 50 MG TAB    Take 50 mg by mouth every day.    THERAPEUTIC MULTIVITAMIN-MINERALS (THERAGRAN-M) TAB    Take 1 Tablet by mouth every day.    VITAMIN E (VITAMIN E) 400 UNIT CAPS    Take 400 Units by mouth every day.         ALLERGIES  Doxycycline, Macrodantin [nitrofurantoin], and Sulfa drugs    PHYSICAL EXAM  BP 96/60   Pulse 86   Temp 36.4 °C (97.6 °F)   Resp 18   Ht 1.676 m (5' 6\")   Wt 59 kg (130 lb)   SpO2 91%   BMI 20.98 kg/m²     Constitutional: Alert in no apparent distress.  HENT: No signs of trauma, Bilateral external ears normal, Nose normal.   Eyes: Pupils are equal and reactive, Conjunctiva normal, Non-icteric.   Neck: Normal range of motion, No tenderness, Supple, No stridor.   Lymphatic: No lymphadenopathy noted.   Cardiovascular: Regular rate and rhythm, no murmurs.   Thorax & Lungs: Normal breath sounds, No respiratory distress, No wheezing, No chest tenderness.   Abdomen: Bowel sounds normal, Soft, No tenderness, No masses, No pulsatile masses. No peritoneal signs.  Skin: Warm, Dry, No erythema, No rash.   Back: No bony tenderness, No CVA tenderness.   Extremities: Oozing from leg wound with abrasion and ulceration, Non pulsatile bleeding with swelling to the right knee, Intact distal pulses, No edema, No tenderness, No " cyanosis.  Musculoskeletal: Good range of motion in all major joints. No tenderness to palpation or major deformities noted.   Neurologic: Alert , Normal motor function, Normal sensory function, No focal deficits noted.   Psychiatric: Affect normal, Judgment normal, Mood normal.       DIAGNOSTIC STUDIES & PROCEDURES    Labs:   Results for orders placed or performed during the hospital encounter of 01/08/23   DIAGNOSTIC ALCOHOL   Result Value Ref Range    Diagnostic Alcohol <10.1 <10.1 mg/dL   Comp Metabolic Panel   Result Value Ref Range    Sodium 139 135 - 145 mmol/L    Potassium 4.1 3.6 - 5.5 mmol/L    Chloride 111 96 - 112 mmol/L    Co2 19 (L) 20 - 33 mmol/L    Anion Gap 9.0 7.0 - 16.0    Glucose 88 65 - 99 mg/dL    Bun 18 8 - 22 mg/dL    Creatinine 0.40 (L) 0.50 - 1.40 mg/dL    Calcium 6.9 (LL) 8.5 - 10.5 mg/dL    AST(SGOT) 34 12 - 45 U/L    ALT(SGPT) 11 2 - 50 U/L    Alkaline Phosphatase 110 (H) 30 - 99 U/L    Total Bilirubin 0.3 0.1 - 1.5 mg/dL    Albumin 2.7 (L) 3.2 - 4.9 g/dL    Total Protein 5.3 (L) 6.0 - 8.2 g/dL    Globulin 2.6 1.9 - 3.5 g/dL    A-G Ratio 1.0 g/dL   CBC WITHOUT DIFFERENTIAL   Result Value Ref Range    WBC 14.9 (H) 4.8 - 10.8 K/uL    RBC 2.65 (L) 4.20 - 5.40 M/uL    Hemoglobin 8.3 (L) 12.0 - 16.0 g/dL    Hematocrit 27.5 (L) 37.0 - 47.0 %    .8 (H) 81.4 - 97.8 fL    MCH 31.3 27.0 - 33.0 pg    MCHC 30.2 (L) 33.6 - 35.0 g/dL    RDW 69.6 (H) 35.9 - 50.0 fL    Platelet Count 460 (H) 164 - 446 K/uL    MPV 8.8 (L) 9.0 - 12.9 fL   Prothrombin Time   Result Value Ref Range    PT 13.3 12.0 - 14.6 sec    INR 1.02 0.87 - 1.13   APTT   Result Value Ref Range    APTT 32.0 24.7 - 36.0 sec   COD - Adult (Type and Screen)   Result Value Ref Range    ABO Grouping Only O     Rh Grouping Only POS     Antibody Screen-Cod NEG    ABO Rh Confirm   Result Value Ref Range    ABO Rh Confirm O POS    CORRECTED CALCIUM   Result Value Ref Range    Correct Calcium 7.9 (L) 8.5 - 10.5 mg/dL   ESTIMATED GFR   Result  Value Ref Range    GFR (CKD-EPI) 106 >60 mL/min/1.73 m 2   LACTIC ACID   Result Value Ref Range    Lactic Acid 1.4 0.5 - 2.0 mmol/L   MAGNESIUM   Result Value Ref Range    Magnesium 1.6 1.5 - 2.5 mg/dL      All labs reviewed by me.    Radiology:   The attending Emergency Physician has independently interpreted the diagnostic imaging associated with this visit and is awaiting the final reading from the radiologist, which will be displayed below.    CT-FOREIGN FILM CAT SCAN   Final Result      DX-CHEST-PORTABLE (1 VIEW)   Final Result         1. No acute cardiopulmonary abnormalities are identified.      CT-KNEE W/O PLUS RECONS RIGHT   Final Result         1. There is an open wound in the anterior aspect with small amount of gas.      2. Subacute significant displays comminuted fracture of the distal femur with multiple displaced fragments.      3. Significant fluid and heterotopic calcification seen in the lateral aspect of the distal femoral fracture with rarefaction of the bones. This could reflects subacute nature of the fracture with superimposed infection/osteomyelitis given the open    wound. Underlying lytic mass cannot be excluded. MRI with contrast may be helpful for further evaluation.      CT-CSPINE WITHOUT PLUS RECONS   Final Result         1. No acute fracture from C1 through T1 is visualized.         CT-HEAD W/O   Final Result         1. No acute intracranial abnormality. No evidence of acute intracranial hemorrhage or mass lesion.                          COURSE & MEDICAL DECISION MAKING    8:29 PM - Patient seen and evaluated at bedside. I informed her I will order labs and radiology to evaluate. Patient verbalizes understanding and agreement to this plan of care. Ordered ABO Rh Confirm, COD Adult, Component Cellular, APTT, Prothrombin Time, CBC w/o Diff, CMP, Diagnostic Alcohol, CT Knee w/o Plus recons right, CT Cspine without plus recons, CT Head w/o to evaluate. Differential diagnoses include but  are not limited to: Intercranial injury, neck injury, open fracture    9:19 PM - [facility].    9:25 PM -  I discussed the patient's case and the above findings with Dr. Chapa (Orthopedic Surgery) who agreed to evaluate patient. [facility] hospitalist.     9:44 PM -  I discussed the patient's case and the above findings with Dr. Ye (Hospitalist) who agreed to evaluate patient for hospitalization.     10:14 PM - Patient was reevaluated at bedside. Discussed lab and radiology results with the patient and informed them of results. I then informed them for plans for admission. The patient had the opportunity to ask any questions. The plan for hospitalization was discussed with the patient given their current presentation and diagnostic study results. Patient is understanding and agreeable to the plan for hospitalization.       CRITICAL CARE  The very real possibilty of a deterioration of this patient's condition required the highest level of my preparedness for sudden, emergent intervention.  I provided critical care services, which included medication orders, frequent reevaluations of the patient's condition and response to treatment, ordering and reviewing test results, and discussing the case with various consultants.  The critical care time associated with the care of the patient was 35 minutes. Review chart for interventions. This time is exclusive of any other billable procedures.         ED Observation Status? No; Patient does not meet criteria for ED Observation.     INITIAL ASSESSMENT AND PLAN  Care Narrative: Patient was found to have an open fracture.  There is some oozing from what appears to be an ulceration.  The patient will be given IV antibiotics for the open infection which include Ancef as well as vancomycin.  Orthopedic surgery is consulted and will need to operate.  Patient does have an elevated white count.  Patient is borderline septic.  Patient does have a slightly low blood pressure.   Fluids were given.  The patient will be admitted to the hospital in guarded condition.    ADDITIONAL PROBLEM LIST AND DISPOSITION  None    I have discussed management of the patient with the following physicians and ABBEY's: Cassi (Hospitalist) and Dr. Chapa (Orthopedic surgery)    Discussion of management with other [facility] or appropriate source(s): Select: None     Escalation of care considered, and ultimately not performed: None.    Barriers to care at this time, including but not limited to: Select: None .     Decision tools and prescription drugs considered including, but not limited to: Select: None    DISPOSITION:  Patient will be hospitalized by Dr. Ye in guarded condition.     FINAL IMPRESSION   1. Anemia, unspecified type    2. Type I or II open fracture of right femur, unspecified fracture morphology, unspecified portion of femur, initial encounter ([facility])    3. Fall, initial encounter    4. Total Critical Care time of 35 minutes     Dash MONCADA (Dee), am scribing for, and in the presence of, Aiden Leon M.D..    Electronically signed by: Dash Suresh), 1/8/2023    IAiden M.D. personally performed the services described in this documentation, as scribed by Dash Damon in my presence, and it is both accurate and complete. C    The note accurately reflects work and decisions made by me.  Aiden Leon M.D.  1/9/2023  3:07 AM

## 2023-01-09 NOTE — ED NOTES
72 F BIB [facility] EMS from [facility] after a GLF from her WC  Patient fell 3 weeks ago after becoming lightheaded and dizzy, denies LOC but does not remember fall. Patient landed on right knee on a hardwood floor.  Pain and swelling to right knee with oozing noted    1gm Ancef given PTA

## 2023-01-09 NOTE — ASSESSMENT & PLAN NOTE
This is Sepsis Present on admission  SIRS criteria identified on my evaluation include: Tachycardia, with heart rate greater than 90 BPM and Leukocytosis, with WBC greater than 12,000  Source is Infected left knee   Sepsis protocol initiated  Fluid resuscitation ordered per protocol  Crystalloid Fluid Administration: Fluid resuscitation ordered per standard protocol - 30 mL/kg per current or ideal body weight  IV antibiotics as appropriate for source of sepsis  Reassessment: I have reassessed the patient's hemodynamic status

## 2023-01-09 NOTE — ANESTHESIA TIME REPORT
Anesthesia Start and Stop Event Times     Date Time Event    1/9/2023 1024 Ready for Procedure     1049 Anesthesia Start     1353 Anesthesia Stop        Responsible Staff  01/09/23    Name Role Begin End    Aiden Carmichael M.D. Anesth 1049 [id number]        Overtime Reason:  no overtime (within assigned shift)    Comments:

## 2023-01-10 LAB
ALBUMIN SERPL BCP-MCNC: 2.2 G/DL (ref 3.2–4.9)
ALBUMIN/GLOB SERPL: 0.9 G/DL
ALP SERPL-CCNC: 101 U/L (ref 30–99)
ALT SERPL-CCNC: 9 U/L (ref 2–50)
ANION GAP SERPL CALC-SCNC: 7 MMOL/L (ref 7–16)
AST SERPL-CCNC: 13 U/L (ref 12–45)
BARCODED ABORH UBTYP: 5100
BARCODED PRD CODE UBPRD: NORMAL
BARCODED UNIT NUM UBUNT: NORMAL
BASOPHILS # BLD AUTO: 0.1 % (ref 0–1.8)
BASOPHILS # BLD: 0.02 K/UL (ref 0–0.12)
BILIRUB SERPL-MCNC: 0.3 MG/DL (ref 0.1–1.5)
BUN SERPL-MCNC: 11 MG/DL (ref 8–22)
CALCIUM ALBUM COR SERPL-MCNC: 8.9 MG/DL (ref 8.5–10.5)
CALCIUM SERPL-MCNC: 7.5 MG/DL (ref 8.5–10.5)
CHLORIDE SERPL-SCNC: 108 MMOL/L (ref 96–112)
CO2 SERPL-SCNC: 22 MMOL/L (ref 20–33)
COMPONENT R 8504R: NORMAL
CORTIS SERPL-MCNC: 2.2 UG/DL (ref 0–23)
CREAT SERPL-MCNC: 0.45 MG/DL (ref 0.5–1.4)
EOSINOPHIL # BLD AUTO: 0 K/UL (ref 0–0.51)
EOSINOPHIL NFR BLD: 0 % (ref 0–6.9)
ERYTHROCYTE [DISTWIDTH] IN BLOOD BY AUTOMATED COUNT: 64.4 FL (ref 35.9–50)
ERYTHROCYTE [DISTWIDTH] IN BLOOD BY AUTOMATED COUNT: 67 FL (ref 35.9–50)
GFR SERPLBLD CREATININE-BSD FMLA CKD-EPI: 103 ML/MIN/1.73 M 2
GLOBULIN SER CALC-MCNC: 2.4 G/DL (ref 1.9–3.5)
GLUCOSE SERPL-MCNC: 115 MG/DL (ref 65–99)
HCT VFR BLD AUTO: 20.2 % (ref 37–47)
HCT VFR BLD AUTO: 25 % (ref 37–47)
HGB BLD-MCNC: 6 G/DL (ref 12–16)
HGB BLD-MCNC: 7.8 G/DL (ref 12–16)
IMM GRANULOCYTES # BLD AUTO: 0.09 K/UL (ref 0–0.11)
IMM GRANULOCYTES NFR BLD AUTO: 0.7 % (ref 0–0.9)
IRON SATN MFR SERPL: 7 % (ref 15–55)
IRON SERPL-MCNC: 14 UG/DL (ref 40–170)
LYMPHOCYTES # BLD AUTO: 1.06 K/UL (ref 1–4.8)
LYMPHOCYTES NFR BLD: 7.8 % (ref 22–41)
MAGNESIUM SERPL-MCNC: 1.8 MG/DL (ref 1.5–2.5)
MCH RBC QN AUTO: 30.6 PG (ref 27–33)
MCH RBC QN AUTO: 31.2 PG (ref 27–33)
MCHC RBC AUTO-ENTMCNC: 29.7 G/DL (ref 33.6–35)
MCHC RBC AUTO-ENTMCNC: 31.2 G/DL (ref 33.6–35)
MCV RBC AUTO: 100 FL (ref 81.4–97.8)
MCV RBC AUTO: 103.1 FL (ref 81.4–97.8)
MONOCYTES # BLD AUTO: 1.1 K/UL (ref 0–0.85)
MONOCYTES NFR BLD AUTO: 8.1 % (ref 0–13.4)
NEUTROPHILS # BLD AUTO: 11.26 K/UL (ref 2–7.15)
NEUTROPHILS NFR BLD: 83.3 % (ref 44–72)
NRBC # BLD AUTO: 0 K/UL
NRBC BLD-RTO: 0 /100 WBC
PLATELET # BLD AUTO: 398 K/UL (ref 164–446)
PLATELET # BLD AUTO: 521 K/UL (ref 164–446)
PMV BLD AUTO: 8.8 FL (ref 9–12.9)
PMV BLD AUTO: 8.8 FL (ref 9–12.9)
POTASSIUM SERPL-SCNC: 4.3 MMOL/L (ref 3.6–5.5)
PRODUCT TYPE UPROD: NORMAL
PROT SERPL-MCNC: 4.6 G/DL (ref 6–8.2)
RBC # BLD AUTO: 1.96 M/UL (ref 4.2–5.4)
RBC # BLD AUTO: 2.5 M/UL (ref 4.2–5.4)
RHODAMINE-AURAMINE STN SPEC: NORMAL
SIGNIFICANT IND 70042: NORMAL
SITE SITE: NORMAL
SODIUM SERPL-SCNC: 137 MMOL/L (ref 135–145)
SOURCE SOURCE: NORMAL
TIBC SERPL-MCNC: 195 UG/DL (ref 250–450)
UIBC SERPL-MCNC: 181 UG/DL (ref 110–370)
UNIT STATUS USTAT: NORMAL
VANCOMYCIN TROUGH SERPL-MCNC: 7.9 UG/ML (ref 10–20)
WBC # BLD AUTO: 10.7 K/UL (ref 4.8–10.8)
WBC # BLD AUTO: 13.5 K/UL (ref 4.8–10.8)

## 2023-01-10 PROCEDURE — 700101 HCHG RX REV CODE 250: Performed by: INTERNAL MEDICINE

## 2023-01-10 PROCEDURE — 30233N1 TRANSFUSION OF NONAUTOLOGOUS RED BLOOD CELLS INTO PERIPHERAL VEIN, PERCUTANEOUS APPROACH: ICD-10-PCS | Performed by: INTERNAL MEDICINE

## 2023-01-10 PROCEDURE — 83550 IRON BINDING TEST: CPT

## 2023-01-10 PROCEDURE — 85025 COMPLETE CBC W/AUTO DIFF WBC: CPT

## 2023-01-10 PROCEDURE — 83735 ASSAY OF MAGNESIUM: CPT

## 2023-01-10 PROCEDURE — 97602 WOUND(S) CARE NON-SELECTIVE: CPT

## 2023-01-10 PROCEDURE — P9016 RBC LEUKOCYTES REDUCED: HCPCS

## 2023-01-10 PROCEDURE — 83540 ASSAY OF IRON: CPT

## 2023-01-10 PROCEDURE — 80202 ASSAY OF VANCOMYCIN: CPT

## 2023-01-10 PROCEDURE — A9270 NON-COVERED ITEM OR SERVICE: HCPCS | Performed by: STUDENT IN AN ORGANIZED HEALTH CARE EDUCATION/TRAINING PROGRAM

## 2023-01-10 PROCEDURE — 700105 HCHG RX REV CODE 258: Performed by: INTERNAL MEDICINE

## 2023-01-10 PROCEDURE — 99291 CRITICAL CARE FIRST HOUR: CPT | Performed by: INTERNAL MEDICINE

## 2023-01-10 PROCEDURE — 700102 HCHG RX REV CODE 250 W/ 637 OVERRIDE(OP): Performed by: STUDENT IN AN ORGANIZED HEALTH CARE EDUCATION/TRAINING PROGRAM

## 2023-01-10 PROCEDURE — 700111 HCHG RX REV CODE 636 W/ 250 OVERRIDE (IP): Performed by: INTERNAL MEDICINE

## 2023-01-10 PROCEDURE — 82533 TOTAL CORTISOL: CPT

## 2023-01-10 PROCEDURE — 86923 COMPATIBILITY TEST ELECTRIC: CPT

## 2023-01-10 PROCEDURE — 700111 HCHG RX REV CODE 636 W/ 250 OVERRIDE (IP): Performed by: STUDENT IN AN ORGANIZED HEALTH CARE EDUCATION/TRAINING PROGRAM

## 2023-01-10 PROCEDURE — 770022 HCHG ROOM/CARE - ICU (200)

## 2023-01-10 PROCEDURE — 700105 HCHG RX REV CODE 258: Performed by: STUDENT IN AN ORGANIZED HEALTH CARE EDUCATION/TRAINING PROGRAM

## 2023-01-10 PROCEDURE — 85027 COMPLETE CBC AUTOMATED: CPT

## 2023-01-10 PROCEDURE — 80053 COMPREHEN METABOLIC PANEL: CPT

## 2023-01-10 PROCEDURE — 36430 TRANSFUSION BLD/BLD COMPNT: CPT

## 2023-01-10 RX ORDER — ENOXAPARIN SODIUM 100 MG/ML
40 INJECTION SUBCUTANEOUS DAILY
Status: DISCONTINUED | OUTPATIENT
Start: 2023-01-10 | End: 2023-01-22 | Stop reason: HOSPADM

## 2023-01-10 RX ORDER — MAGNESIUM SULFATE HEPTAHYDRATE 40 MG/ML
2 INJECTION, SOLUTION INTRAVENOUS ONCE
Status: COMPLETED | OUTPATIENT
Start: 2023-01-10 | End: 2023-01-10

## 2023-01-10 RX ADMIN — SODIUM HYPOCHLORITE 1 ML: 1.25 SOLUTION TOPICAL at 18:15

## 2023-01-10 RX ADMIN — CYANOCOBALAMIN TAB 500 MCG 500 MCG: 500 TAB at 05:07

## 2023-01-10 RX ADMIN — OXYCODONE HYDROCHLORIDE 5 MG: 5 TABLET ORAL at 22:10

## 2023-01-10 RX ADMIN — OXYCODONE HYDROCHLORIDE 5 MG: 5 TABLET ORAL at 18:19

## 2023-01-10 RX ADMIN — OXYCODONE HYDROCHLORIDE 5 MG: 5 TABLET ORAL at 03:20

## 2023-01-10 RX ADMIN — HYDROCORTISONE SODIUM SUCCINATE 50 MG: 100 INJECTION, POWDER, FOR SOLUTION INTRAMUSCULAR; INTRAVENOUS at 23:39

## 2023-01-10 RX ADMIN — PIPERACILLIN AND TAZOBACTAM 4.5 G: 4; .5 INJECTION, POWDER, LYOPHILIZED, FOR SOLUTION INTRAVENOUS; PARENTERAL at 05:09

## 2023-01-10 RX ADMIN — DOCUSATE SODIUM 50 MG AND SENNOSIDES 8.6 MG 2 TABLET: 8.6; 5 TABLET, FILM COATED ORAL at 05:07

## 2023-01-10 RX ADMIN — ENOXAPARIN SODIUM 40 MG: 40 INJECTION SUBCUTANEOUS at 18:13

## 2023-01-10 RX ADMIN — LEVOTHYROXINE SODIUM 50 MCG: 0.05 TABLET ORAL at 05:07

## 2023-01-10 RX ADMIN — OXYCODONE HYDROCHLORIDE 5 MG: 5 TABLET ORAL at 12:44

## 2023-01-10 RX ADMIN — OXYCODONE HYDROCHLORIDE 5 MG: 5 TABLET ORAL at 07:28

## 2023-01-10 RX ADMIN — HYDROCORTISONE SODIUM SUCCINATE 50 MG: 100 INJECTION, POWDER, FOR SOLUTION INTRAMUSCULAR; INTRAVENOUS at 16:16

## 2023-01-10 RX ADMIN — VANCOMYCIN HYDROCHLORIDE 1000 MG: 500 INJECTION, POWDER, LYOPHILIZED, FOR SOLUTION INTRAVENOUS at 00:37

## 2023-01-10 RX ADMIN — VANCOMYCIN HYDROCHLORIDE 750 MG: 500 INJECTION, POWDER, LYOPHILIZED, FOR SOLUTION INTRAVENOUS at 18:16

## 2023-01-10 RX ADMIN — SODIUM CHLORIDE 1000 ML: 9 INJECTION, SOLUTION INTRAVENOUS at 16:18

## 2023-01-10 RX ADMIN — DOCUSATE SODIUM 50 MG AND SENNOSIDES 8.6 MG 2 TABLET: 8.6; 5 TABLET, FILM COATED ORAL at 18:14

## 2023-01-10 RX ADMIN — MAGNESIUM SULFATE HEPTAHYDRATE 2 G: 40 INJECTION, SOLUTION INTRAVENOUS at 10:03

## 2023-01-10 RX ADMIN — PIPERACILLIN AND TAZOBACTAM 4.5 G: 4; .5 INJECTION, POWDER, LYOPHILIZED, FOR SOLUTION INTRAVENOUS; PARENTERAL at 20:50

## 2023-01-10 RX ADMIN — SODIUM HYPOCHLORITE 1 ML: 1.25 SOLUTION TOPICAL at 05:07

## 2023-01-10 RX ADMIN — PIPERACILLIN AND TAZOBACTAM 4.5 G: 4; .5 INJECTION, POWDER, LYOPHILIZED, FOR SOLUTION INTRAVENOUS; PARENTERAL at 13:17

## 2023-01-10 RX ADMIN — HYDROCORTISONE SODIUM SUCCINATE 50 MG: 100 INJECTION, POWDER, FOR SOLUTION INTRAMUSCULAR; INTRAVENOUS at 18:19

## 2023-01-10 RX ADMIN — PHENYLEPHRINE HYDROCHLORIDE 0.75 MCG/KG/MIN: 10 INJECTION INTRAVENOUS at 05:11

## 2023-01-10 ASSESSMENT — PAIN DESCRIPTION - PAIN TYPE
TYPE: ACUTE PAIN

## 2023-01-10 ASSESSMENT — FIBROSIS 4 INDEX: FIB4 SCORE: 0.58

## 2023-01-10 NOTE — CARE PLAN
The patient is Stable - Low risk of patient condition declining or worsening    Shift Goals  Clinical Goals: hemodynamic stability  Patient Goals: rest  Family Goals: not present      Problem: Knowledge Deficit - Standard  Goal: Patient and family/care givers will demonstrate understanding of plan of care, disease process/condition, diagnostic tests and medications  Outcome: Progressing     Problem: Skin Integrity  Goal: Skin integrity is maintained or improved  Outcome: Progressing     Problem: Fall Risk  Goal: Patient will remain free from falls  Outcome: Progressing

## 2023-01-10 NOTE — OR NURSING
Patient recovered well post op. A&Ox4. VSS, 2L NC. Surgical sites CDI. Surgical pain managed w/ femoral nerve block completed in pre op by . Patient able to drink fluids without Nausea and vomiting. Patient started on Phenylephrine while in the PACU. Hosptalist notified of hypotension in the PACU. Orders for ICU admit put in. PT belongings claimed by the patient and sat on the bed.  called to update and discussed plan of care  didn't answer. Report called to Benjie. Patient transported by this nurse to S111

## 2023-01-10 NOTE — CONSULTS
Critical Care Consultation    Date of consult: 1/9/2023    Referring Physician  Florian Pablo M.D.    Reason for Consultation  Hypotension after intramedullary femoral fixation and wound debridement of a 2-week old open type I distal femur fracture after ground-level fall approximately 2 weeks prior to admission.    History of Presenting Illness  72 y.o. female, paraplegic/wheelchair-bound secondary to transverse myelitis, coccygeal decubitus ulcer who presented 1/8/2023 swollen knee after falling in the kitchen on [facility] floor approximately 2 weeks prior to admission.  She has paraplegia and diminished sensation.  On presentation she was noted to have open wound with purulent discharge.  There was concern for possible osteomyelitis.  Today she underwent I&D of the wound and intramedullary nail fixation of the right femur.  Wound and bone cultures were obtained and sent.  Postoperatively she was hypotensive in PACU.  She received 2.5 L of crystalloid intraoperatively and additional liter postoperatively.  She has been started on IV Jack-Synephrine and will be transferred to ICU.  She is currently receiving ceftriaxone and vancomycin.  We will escalate to Zosyn-vancomycin and de-escalate with culture availability.  BC x2 from 1/8 as well as initial wound culture have been no growth today.    Code Status  Full Code    Review of Systems  Review of Systems   Unable to perform ROS: Acuity of condition     Past Medical History  Past Medical History:   Diagnosis Date    Transverse myelitis, paraplegia, wheelchair-bound     Microcytic anemia 1/8/2023    Muscle disorder     Septic joint of left knee joint (HCC) 1/8/2023    Urinary tract infection, site not specified        Surgical History   has a past surgical history that includes abdominal exploration.    Family History  Family history is unknown by patient.    Social History   reports that she has been smoking cigarettes. She has a 7.50 pack-year smoking history. She  does not have any smokeless tobacco history on file. She reports that she does not drink alcohol and does not use drugs.    Medications  Home Medications       Reviewed by Daniel Smart R.N. (Registered Nurse) on 01/09/23 at 1317  Med List Status: Complete     Medication Last Dose Status   acetaminophen (Tylenol) tablet 650 mg  Active   Ascorbic Acid (VITAMIN C) 1000 MG Tab 1/8/2023 Active   bisacodyl (DULCOLAX) suppository 10 mg  Active   ceFAZolin (ANCEF) injection  Active   cefTRIAXone (Rocephin) syringe 1,000 mg  Active   Cranberry 450 MG Cap 1/8/2023 Active   cyanocobalamin (VITAMIN B-12) 500 MCG Tab 1/8/2023 Active   cyanocobalamin (VITAMIN B-12) tablet 500 mcg  Active   D-Mannose 500 MG Cap 1/8/2023 Active   dexamethasone (DECADRON) injection  Active   ePHEDrine injection  Active   fentaNYL (SUBLIMAZE) injection  Active   lactated ringers infusion (BOLUS): BMI less than or equal to 30  Active   levothyroxine (SYNTHROID) 50 MCG Tab 1/8/2023 Active   levothyroxine (SYNTHROID) tablet 50 mcg  Active   lidocaine PF (XYLOCAINE-MPF) 2 % injection PF  Active   lysine 500 MG Tab 1/8/2023 Active   magnesium hydroxide (MILK OF MAGNESIA) suspension 30 mL  Active   MD Alert...Vancomycin per Pharmacy  Active   NS infusion  Active   NS infusion  Active   ondansetron (ZOFRAN) syringe/vial injection  Active   oxybutynin SR (DITROPAN-XL) 10 MG CR tablet 1/8/2023 Active   oxybutynin SR (DITROPAN-XL) tablet 10 mg  Active   oxyCODONE immediate-release (ROXICODONE) tablet 5 mg  Active   phenylephrine (MAHESH-SYNEPHRINE) injection  Active   polyethylene glycol/lytes (MIRALAX) PACKET 1 Packet  Active   propofol (DIPRIVAN) injection  Active   pyridoxine (VITAMIN B-6) 50 MG Tab 1/8/2023 Active   rocuronium (ZEMURON) injection  Active   senna-docusate (PERICOLACE or SENOKOT S) 8.6-50 MG per tablet 2 Tablet  Active   sugammadex (BRIDION) injection  Active   therapeutic multivitamin-minerals (THERAGRAN-M) Tab 1/8/2023 Active   tobramycin  (NEBCIN) injection  Active   vancomycin (VANCOCIN) 1,000 mg in  mL IVPB  Active   vasopressin (Vasostrict) injection  Active   vitamin e (VITAMIN E) 400 UNIT CAPS 1/8/2023 Active                  Current Facility-Administered Medications   Medication Dose Route Frequency Provider Last Rate Last Admin    oxyCODONE immediate-release (ROXICODONE) tablet 5 mg  5 mg Oral Q4HRS PRN Matthew Ye M.D.   5 mg at 01/09/23 0807    NS infusion   Intravenous Continuous Florian Pablo M.D. 83 mL/hr at 01/09/23 1623 New Bag at 01/09/23 1623    senna-docusate (PERICOLACE or SENOKOT S) 8.6-50 MG per tablet 2 Tablet  2 Tablet Oral BID Gabi Byrne M.D.   2 Tablet at 01/09/23 1658    And    polyethylene glycol/lytes (MIRALAX) PACKET 1 Packet  1 Packet Oral QDAY PRN Gabi Byrne M.D.        And    magnesium hydroxide (MILK OF MAGNESIA) suspension 30 mL  30 mL Oral QDAY PRN Gabi Byrne M.D.        And    bisacodyl (DULCOLAX) suppository 10 mg  10 mg Rectal QDAY PRN Gabi Byrne M.D.        lactated ringers infusion (BOLUS): BMI less than or equal to 30  30 mL/kg Intravenous Once PRN Gabi Byrne M.D.        acetaminophen (Tylenol) tablet 650 mg  650 mg Oral Q6HRS PRN Gabi Byrne M.D.        cefTRIAXone (Rocephin) syringe 1,000 mg  1,000 mg Intravenous Q24HRS Gabi Byrne M.D.   1,000 mg at 01/09/23 1658    MD Alert...Vancomycin per Pharmacy   Other PHARMACY TO DOSE Gbai Byrne M.D.        cyanocobalamin (VITAMIN B-12) tablet 500 mcg  500 mcg Oral DAILY Gabi Byrne M.D.   500 mcg at 01/09/23 0547    levothyroxine (SYNTHROID) tablet 50 mcg  50 mcg Oral AM ES Gabi Byrne M.D.   50 mcg at 01/09/23 0547    oxybutynin SR (DITROPAN-XL) tablet 10 mg  10 mg Oral BID Gabi Byrne M.D.   10 mg at 01/08/23 9533    vancomycin (VANCOCIN) 1,000 mg in  mL IVPB  1,000 mg Intravenous Q24HR Gabi Byrne M.D.           Allergies  Allergies   Allergen Reactions    Doxycycline Rash     median     Macrodantin [Nitrofurantoin] Unspecified     Unknown    Sulfa Drugs Hives       Vital Signs last 24 hours  Temp:  [36.2 °C (97.1 °F)-36.6 °C (97.8 °F)] 36.3 °C (97.4 °F)  Pulse:  [63-99] 74  Resp:  [14-26] 14  BP: ()/(42-68) 103/62  SpO2:  [87 %-100 %] 96 %    Physical Exam  Physical Exam  Vitals and nursing note reviewed.   Constitutional:       Comments: Somnolent but arouses follows limited commands seen immediately postoperatively in PACU   HENT:      Head: Normocephalic.      Nose: Nose normal.      Mouth/Throat:      Mouth: Mucous membranes are moist.   Eyes:      Pupils: Pupils are equal, round, and reactive to light.   Cardiovascular:      Rate and Rhythm: Normal rate and regular rhythm.      Pulses: Normal pulses.   Pulmonary:      Effort: Pulmonary effort is normal. No respiratory distress.      Breath sounds: No wheezing.   Abdominal:      General: There is no distension.      Palpations: Abdomen is soft.   Musculoskeletal:         General: No swelling.      Cervical back: Neck supple.   Skin:     Comments: Right buttock wound, see photos, reviewed   Neurological:      Comments: Awake, follows simple commands, moves upper extremities, not lowers       Fluids    Intake/Output Summary (Last 24 hours) at 1/9/2023 1700  Last data filed at 1/9/2023 1318  Gross per 24 hour   Intake 4600 ml   Output 875 ml   Net 3725 ml       Laboratory  Recent Results (from the past 48 hour(s))   MRSA By PCR (Amp)    Collection Time: 01/08/23  3:17 AM    Specimen: Nares; Respirate   Result Value Ref Range    MRSA by PCR Negative Negative   DIAGNOSTIC ALCOHOL    Collection Time: 01/08/23  8:16 PM   Result Value Ref Range    Diagnostic Alcohol <10.1 <10.1 mg/dL   Comp Metabolic Panel    Collection Time: 01/08/23  8:16 PM   Result Value Ref Range    Sodium 139 135 - 145 mmol/L    Potassium 4.1 3.6 - 5.5 mmol/L    Chloride 111 96 - 112 mmol/L    Co2 19 (L) 20 - 33 mmol/L    Anion Gap 9.0 7.0 - 16.0    Glucose 88 65 - 99 mg/dL     Bun 18 8 - 22 mg/dL    Creatinine 0.40 (L) 0.50 - 1.40 mg/dL    Calcium 6.9 (LL) 8.5 - 10.5 mg/dL    AST(SGOT) 34 12 - 45 U/L    ALT(SGPT) 11 2 - 50 U/L    Alkaline Phosphatase 110 (H) 30 - 99 U/L    Total Bilirubin 0.3 0.1 - 1.5 mg/dL    Albumin 2.7 (L) 3.2 - 4.9 g/dL    Total Protein 5.3 (L) 6.0 - 8.2 g/dL    Globulin 2.6 1.9 - 3.5 g/dL    A-G Ratio 1.0 g/dL   CBC WITHOUT DIFFERENTIAL    Collection Time: 01/08/23  8:16 PM   Result Value Ref Range    WBC 14.9 (H) 4.8 - 10.8 K/uL    RBC 2.65 (L) 4.20 - 5.40 M/uL    Hemoglobin 8.3 (L) 12.0 - 16.0 g/dL    Hematocrit 27.5 (L) 37.0 - 47.0 %    .8 (H) 81.4 - 97.8 fL    MCH 31.3 27.0 - 33.0 pg    MCHC 30.2 (L) 33.6 - 35.0 g/dL    RDW 69.6 (H) 35.9 - 50.0 fL    Platelet Count 460 (H) 164 - 446 K/uL    MPV 8.8 (L) 9.0 - 12.9 fL   Prothrombin Time    Collection Time: 01/08/23  8:16 PM   Result Value Ref Range    PT 13.3 12.0 - 14.6 sec    INR 1.02 0.87 - 1.13   APTT    Collection Time: 01/08/23  8:16 PM   Result Value Ref Range    APTT 32.0 24.7 - 36.0 sec   COD - Adult (Type and Screen)    Collection Time: 01/08/23  8:16 PM   Result Value Ref Range    ABO Grouping Only O     Rh Grouping Only POS     Antibody Screen-Cod NEG    CORRECTED CALCIUM    Collection Time: 01/08/23  8:16 PM   Result Value Ref Range    Correct Calcium 7.9 (L) 8.5 - 10.5 mg/dL   ESTIMATED GFR    Collection Time: 01/08/23  8:16 PM   Result Value Ref Range    GFR (CKD-EPI) 106 >60 mL/min/1.73 m 2   VITAMIN B12    Collection Time: 01/08/23  8:16 PM   Result Value Ref Range    Vitamin B12 -True Cobalamin 2632 (H) 211 - 911 pg/mL   MAGNESIUM    Collection Time: 01/08/23  8:16 PM   Result Value Ref Range    Magnesium 1.6 1.5 - 2.5 mg/dL   ABO Rh Confirm    Collection Time: 01/08/23  8:40 PM   Result Value Ref Range    ABO Rh Confirm O POS    BLOOD CULTURE    Collection Time: 01/08/23  9:49 PM    Specimen: Peripheral; Blood   Result Value Ref Range    Significant Indicator NEG     Source BLD      Site PERIPHERAL     Culture Result       No Growth  Note: Blood cultures are incubated for 5 days and  are monitored continuously.Positive blood cultures  are called to the RN and reported as soon as  they are identified.     BLOOD CULTURE    Collection Time: 01/08/23 10:01 PM    Specimen: Peripheral; Blood   Result Value Ref Range    Significant Indicator NEG     Source BLD     Site PERIPHERAL     Culture Result       No Growth  Note: Blood cultures are incubated for 5 days and  are monitored continuously.Positive blood cultures  are called to the RN and reported as soon as  they are identified.     LACTIC ACID    Collection Time: 01/08/23 10:01 PM   Result Value Ref Range    Lactic Acid 1.4 0.5 - 2.0 mmol/L   CULTURE WOUND W/ GRAM STAIN    Collection Time: 01/08/23 10:12 PM    Specimen: Right Leg; Wound   Result Value Ref Range    Significant Indicator NEG     Source WND     Site Right Knee     Culture Result -     Gram Stain Result Rare WBCs.  No organisms seen.      GRAM STAIN    Collection Time: 01/08/23 10:12 PM    Specimen: Wound   Result Value Ref Range    Significant Indicator .     Source WND     Site Right Knee     Gram Stain Result Rare WBCs.  No organisms seen.      COV-2, FLU A/B, AND RSV BY PCR (2-4 HOURS CEPHEID): Collect NP swab in VTM    Collection Time: 01/08/23 11:03 PM    Specimen: Respirate   Result Value Ref Range    Influenza virus A RNA Negative Negative    Influenza virus B, PCR Negative Negative    RSV, PCR Negative Negative    SARS-CoV-2 by PCR NotDetected     SARS-CoV-2 Source NP Swab    CBC with Differential    Collection Time: 01/09/23  3:11 AM   Result Value Ref Range    WBC 12.4 (H) 4.8 - 10.8 K/uL    RBC 2.49 (L) 4.20 - 5.40 M/uL    Hemoglobin 7.8 (L) 12.0 - 16.0 g/dL    Hematocrit 25.9 (L) 37.0 - 47.0 %    .0 (H) 81.4 - 97.8 fL    MCH 31.3 27.0 - 33.0 pg    MCHC 30.1 (L) 33.6 - 35.0 g/dL    RDW 69.2 (H) 35.9 - 50.0 fL    Platelet Count 504 (H) 164 - 446 K/uL    MPV 8.8 (L)  9.0 - 12.9 fL    Neutrophils-Polys 81.80 (H) 44.00 - 72.00 %    Lymphocytes 6.10 (L) 22.00 - 41.00 %    Monocytes 5.20 0.00 - 13.40 %    Eosinophils 1.70 0.00 - 6.90 %    Basophils 0.00 0.00 - 1.80 %    Nucleated RBC 0.00 /100 WBC    Neutrophils (Absolute) 10.79 (H) 2.00 - 7.15 K/uL    Lymphs (Absolute) 0.76 (L) 1.00 - 4.80 K/uL    Monos (Absolute) 0.64 0.00 - 0.85 K/uL    Eos (Absolute) 0.21 0.00 - 0.51 K/uL    Baso (Absolute) 0.00 0.00 - 0.12 K/uL    NRBC (Absolute) 0.00 K/uL    Hypochromia 1+     Anisocytosis 2+ (A)     Macrocytosis 1+     Microcytosis 2+ (A)    Comp Metabolic Panel (CMP)    Collection Time: 01/09/23  3:11 AM   Result Value Ref Range    Sodium 138 135 - 145 mmol/L    Potassium 4.1 3.6 - 5.5 mmol/L    Chloride 109 96 - 112 mmol/L    Co2 21 20 - 33 mmol/L    Anion Gap 8.0 7.0 - 16.0    Glucose 99 65 - 99 mg/dL    Bun 17 8 - 22 mg/dL    Creatinine 0.42 (L) 0.50 - 1.40 mg/dL    Calcium 8.1 (L) 8.5 - 10.5 mg/dL    AST(SGOT) 13 12 - 45 U/L    ALT(SGPT) 7 2 - 50 U/L    Alkaline Phosphatase 111 (H) 30 - 99 U/L    Total Bilirubin 0.3 0.1 - 1.5 mg/dL    Albumin 2.6 (L) 3.2 - 4.9 g/dL    Total Protein 5.1 (L) 6.0 - 8.2 g/dL    Globulin 2.5 1.9 - 3.5 g/dL    A-G Ratio 1.0 g/dL   CORRECTED CALCIUM    Collection Time: 01/09/23  3:11 AM   Result Value Ref Range    Correct Calcium 9.2 8.5 - 10.5 mg/dL   ESTIMATED GFR    Collection Time: 01/09/23  3:11 AM   Result Value Ref Range    GFR (CKD-EPI) 105 >60 mL/min/1.73 m 2   DIFFERENTIAL MANUAL    Collection Time: 01/09/23  3:11 AM   Result Value Ref Range    Bands-Stabs 5.20 0.00 - 10.00 %    Manual Diff Status PERFORMED    PERIPHERAL SMEAR REVIEW    Collection Time: 01/09/23  3:11 AM   Result Value Ref Range    Peripheral Smear Review see below    PLATELET ESTIMATE    Collection Time: 01/09/23  3:11 AM   Result Value Ref Range    Plt Estimation Increased    MORPHOLOGY    Collection Time: 01/09/23  3:11 AM   Result Value Ref Range    RBC Morphology Present      Polychromia 1+    URINALYSIS    Collection Time: 01/09/23  5:32 AM    Specimen: Urine, Parker Cath   Result Value Ref Range    Color Yellow     Character Clear     Specific Gravity 1.026 <1.035    Ph 5.5 5.0 - 8.0    Glucose Negative Negative mg/dL    Ketones 15 (A) Negative mg/dL    Protein Negative Negative mg/dL    Bilirubin Negative Negative    Urobilinogen, Urine 0.2 Negative    Nitrite Positive (A) Negative    Leukocyte Esterase Trace (A) Negative    Occult Blood Negative Negative    Micro Urine Req Microscopic    URINE MICROSCOPIC (W/UA)    Collection Time: 01/09/23  5:32 AM   Result Value Ref Range    WBC 2-5 /hpf    RBC 5-10 (A) /hpf    Bacteria Moderate (A) None /hpf    Epithelial Cells Negative /hpf    Hyaline Cast 0-2 /lpf   IRON/TOTAL IRON BIND    Collection Time: 01/09/23  9:49 AM   Result Value Ref Range    Iron 20 (L) 40 - 170 ug/dL    Total Iron Binding 218 (L) 250 - 450 ug/dL    Unsat Iron Binding 198 110 - 370 ug/dL    % Saturation 9 (L) 15 - 55 %   VITAMIN B12    Collection Time: 01/09/23  9:49 AM   Result Value Ref Range    Vitamin B12 -True Cobalamin 2559 (H) 211 - 911 pg/mL   TSH WITH REFLEX TO FT4    Collection Time: 01/09/23  9:49 AM   Result Value Ref Range    TSH 6.300 (H) 0.380 - 5.330 uIU/mL   FOLATE    Collection Time: 01/09/23  9:49 AM   Result Value Ref Range    Folate -Folic Acid 12.5 >4.0 ng/mL   FREE THYROXINE    Collection Time: 01/09/23  9:49 AM   Result Value Ref Range    Free T-4 0.94 0.93 - 1.70 ng/dL   CULTURE TISSUE W/ GRM STAIN    Collection Time: 01/09/23 11:35 AM    Specimen: Tissue   Result Value Ref Range    Significant Indicator NEG     Source TISS     Site DISTAL FEMUR TISSUE     Culture Result -     Gram Stain Result -    Anaerobic Culture    Collection Time: 01/09/23 11:35 AM    Specimen: Tissue   Result Value Ref Range    Significant Indicator NEG     Source TISS     Site DISTAL FEMUR TISSUE     Culture Result -    Fungal Culture    Collection Time: 01/09/23 11:35  AM    Specimen: Tissue   Result Value Ref Range    Significant Indicator NEG     Source TISS     Site DISTAL FEMUR TISSUE     Culture Result -     Fungal Smear Results -    AFB Culture    Collection Time: 01/09/23 11:35 AM    Specimen: Tissue   Result Value Ref Range    Significant Indicator NEG     Source TISS     Site DISTAL FEMUR TISSUE     Culture Result -     AFB Smear Results -    CULTURE WOUND W/ GRAM STAIN    Collection Time: 01/09/23 11:35 AM    Specimen: Wound   Result Value Ref Range    Significant Indicator NEG     Source WND     Site DISTAL FEMUR #1     Culture Result -     Gram Stain Result -    Anaerobic Culture    Collection Time: 01/09/23 11:35 AM    Specimen: Wound   Result Value Ref Range    Significant Indicator NEG     Source WND     Site DISTAL FEMUR #1     Culture Result -    Fungal Culture    Collection Time: 01/09/23 11:35 AM    Specimen: Wound   Result Value Ref Range    Significant Indicator NEG     Source WND     Site DISTAL FEMUR #1     Culture Result -     Fungal Smear Results -    CULTURE WOUND W/ GRAM STAIN    Collection Time: 01/09/23 11:35 AM    Specimen: Wound   Result Value Ref Range    Significant Indicator NEG     Source WND     Site DISTAL FEMUR #2     Culture Result -     Gram Stain Result -    Anaerobic Culture    Collection Time: 01/09/23 11:35 AM    Specimen: Wound   Result Value Ref Range    Significant Indicator NEG     Source WND     Site DISTAL FEMUR #2     Culture Result -    Fungal Culture    Collection Time: 01/09/23 11:35 AM    Specimen: Wound   Result Value Ref Range    Significant Indicator NEG     Source WND     Site DISTAL FEMUR #2     Culture Result -     Fungal Smear Results -    CULTURE WOUND W/ GRAM STAIN    Collection Time: 01/09/23 11:35 AM    Specimen: Wound   Result Value Ref Range    Significant Indicator NEG     Source WND     Site DISTAL FEMUR #3     Culture Result -     Gram Stain Result -    Anaerobic Culture    Collection Time: 01/09/23 11:35 AM     Specimen: Wound   Result Value Ref Range    Significant Indicator NEG     Source WND     Site DISTAL FEMUR #3     Culture Result -    Fungal Culture    Collection Time: 01/09/23 11:35 AM    Specimen: Wound   Result Value Ref Range    Significant Indicator NEG     Source WND     Site DISTAL FEMUR #3     Culture Result -     Fungal Smear Results -    CBC WITH DIFFERENTIAL    Collection Time: 01/09/23  3:31 PM   Result Value Ref Range    WBC 24.3 (H) 4.8 - 10.8 K/uL    RBC 2.28 (L) 4.20 - 5.40 M/uL    Hemoglobin 7.2 (L) 12.0 - 16.0 g/dL    Hematocrit 23.9 (L) 37.0 - 47.0 %    .8 (H) 81.4 - 97.8 fL    MCH 31.6 27.0 - 33.0 pg    MCHC 30.1 (L) 33.6 - 35.0 g/dL    RDW 69.5 (H) 35.9 - 50.0 fL    Platelet Count 544 (H) 164 - 446 K/uL    MPV 8.7 (L) 9.0 - 12.9 fL    Neutrophils-Polys 95.60 (H) 44.00 - 72.00 %    Lymphocytes 1.90 (L) 22.00 - 41.00 %    Monocytes 1.40 0.00 - 13.40 %    Eosinophils 0.00 0.00 - 6.90 %    Basophils 0.20 0.00 - 1.80 %    Immature Granulocytes 0.90 0.00 - 0.90 %    Nucleated RBC 0.00 /100 WBC    Neutrophils (Absolute) 23.25 (H) 2.00 - 7.15 K/uL    Lymphs (Absolute) 0.47 (L) 1.00 - 4.80 K/uL    Monos (Absolute) 0.35 0.00 - 0.85 K/uL    Eos (Absolute) 0.01 0.00 - 0.51 K/uL    Baso (Absolute) 0.04 0.00 - 0.12 K/uL    Immature Granulocytes (abs) 0.22 (H) 0.00 - 0.11 K/uL    NRBC (Absolute) 0.00 K/uL   Basic Metabolic Panel    Collection Time: 01/09/23  3:31 PM   Result Value Ref Range    Sodium 139 135 - 145 mmol/L    Potassium 4.2 3.6 - 5.5 mmol/L    Chloride 110 96 - 112 mmol/L    Co2 20 20 - 33 mmol/L    Glucose 118 (H) 65 - 99 mg/dL    Bun 14 8 - 22 mg/dL    Creatinine 0.44 (L) 0.50 - 1.40 mg/dL    Calcium 7.4 (L) 8.5 - 10.5 mg/dL    Anion Gap 9.0 7.0 - 16.0   LACTIC ACID    Collection Time: 01/09/23  3:31 PM   Result Value Ref Range    Lactic Acid 1.7 0.5 - 2.0 mmol/L   ESTIMATED GFR    Collection Time: 01/09/23  3:31 PM   Result Value Ref Range    GFR (CKD-EPI) 103 >60 mL/min/1.73 m 2        Imaging  DX-FEMUR-2+ RIGHT   Final Result      Postoperative change from resection of the distal RIGHT femur with internal fixation.      DX-PORTABLE FLUORO > 1 HOUR   Final Result      Portable fluoroscopy utilized for 1 minute 42 seconds.         INTERPRETING LOCATION: [address] [place] [address] [place], [id number]      DX-FEMUR-2+ RIGHT   Final Result      Portable intraoperative imaging with findings as described above.      CT-FOREIGN FILM CAT SCAN   Final Result      DX-CHEST-PORTABLE (1 VIEW)   Final Result         1. No acute cardiopulmonary abnormalities are identified.      CT-KNEE W/O PLUS RECONS RIGHT   Final Result         1. There is an open wound in the anterior aspect with small amount of gas.      2. Subacute significant displays comminuted fracture of the distal femur with multiple displaced fragments.      3. Significant fluid and heterotopic calcification seen in the lateral aspect of the distal femoral fracture with rarefaction of the bones. This could reflects subacute nature of the fracture with superimposed infection/osteomyelitis given the open    wound. Underlying lytic mass cannot be excluded. MRI with contrast may be helpful for further evaluation.      CT-CSPINE WITHOUT PLUS RECONS   Final Result         1. No acute fracture from C1 through T1 is visualized.         CT-HEAD W/O   Final Result         1. No acute intracranial abnormality. No evidence of acute intracranial hemorrhage or mass lesion.                         Assessment/Plan  Sepsis  -Secondary to open distal right femur fracture   -Wound debridement with intramedullary femoral fixation and multiple culture sent 1/9  -Received crystalloid volume resuscitation, lactate 1.7, sepsis order set per hospitalist  -Initial blood cultures, wound cultures no growth today, follow-up repeat tissue/wound cultures  -Started on Jack-Synephrine in PACU for hypotension postoperatively  -Continue titrated vasopressors, consider stress dose  hydrocortisone  -Continue broad-spectrum antibiotics with Zosyn/vancomycin, discussed with ID in a.m.  -Wound care    Decubitus ulcer  -Right buttock/ischium, clean appearing   -Wound care    Paraplegia, secondary to transverse myelitis  -wheelchair-bound at baseline    Anemia  -Without evidence of acute blood loss  -Follow-up iron studies/anemia work-up    Goals of care/advance care planning  -Discussed with patient further, family, palliative care  -Currently full CODE STATUS    Discussed patient condition and risk of morbidity and/or mortality with Hospitalist, RN, and RT.    The patient remains critically ill.  Critical care time = 45 minutes in directly providing and coordinating critical care and extensive data review.  No time overlap and excludes procedures.

## 2023-01-10 NOTE — PROGRESS NOTES
Critical Care Progress Note    Date of admission  1/8/2023    Chief Complaint  72 y.o. female admitted 1/8/2023 with hypotension after intramedullary femoral fixation and wound debridement of a 2-week old open type I distal femur fracture after ground-level fall approximately 2 weeks prior to admission.    Hospital Course  72 y.o. female, paraplegic/wheelchair-bound secondary to transverse myelitis, coccygeal decubitus ulcer who presented 1/8/2023 swollen knee after falling in the kitchen on hardwood floor approximately 2 weeks prior to admission.  She has paraplegia and diminished sensation.  On presentation she was noted to have open wound with purulent discharge.  There was concern for possible osteomyelitis.  Today she underwent I&D of the wound and intramedullary nail fixation of the right femur.  Wound and bone cultures were obtained and sent.  Postoperatively she was hypotensive in PACU.  She received 2.5 L of crystalloid intraoperatively and additional liter postoperatively.  She has been started on IV Jack-Synephrine and will be transferred to ICU.  She is currently receiving ceftriaxone and vancomycin.  We will escalate to Zosyn-vancomycin and de-escalate with culture availability.  BC x2 from 1/8 as well as initial wound culture have been no growth today.    1/10 - Hgb 6.0, transfuse 1 unit PRBCs, follow-up iron studies, cortisol 2.2, add stress steroids, wean off Jack-Synephrine    Interval Problem Update  Reviewed last 24 hour events:  A/o x 4  Oxy for pain  Limited LE movement  SR  Jack low-dose  Random cortisol level 2.2, add stress hydrocortisone  Afebrile  2 lpm oxygen  No drains, no   I/O = 6L/1.2L  Parker in place  NS 83  Vanco/zosyn    ? Iron studies  Start lovenox ppx  Consult ID        Review of Systems  Review of Systems   Unable to perform ROS: Acuity of condition      Vital Signs for last 24 hours   Temp:  [36.2 °C (97.1 °F)-37.1 °C (98.8 °F)] 37.1 °C (98.8 °F)  Pulse:  [63-96] 70  Resp:   [11-32] 18  BP: ()/(41-65) 101/52  SpO2:  [89 %-100 %] 100 %    Hemodynamic parameters for last 24 hours       Respiratory Information for the last 24 hours       Physical Exam   Physical Exam  Vitals and nursing note reviewed.   Constitutional:       Comments: Somnolent but arouses, follows commands, oriented   HENT:      Head: Normocephalic.      Nose: Nose normal.      Mouth/Throat:      Mouth: Mucous membranes are moist.   Eyes:      Pupils: Pupils are equal, round, and reactive to light.   Cardiovascular:      Rate and Rhythm: Normal rate and regular rhythm.      Pulses: Normal pulses.   Pulmonary:      Effort: Pulmonary effort is normal. No respiratory distress.      Breath sounds: No wheezing.   Abdominal:      General: There is no distension.      Palpations: Abdomen is soft.   Musculoskeletal:         General: No swelling.      Cervical back: Neck supple.   Skin:     Comments: Right buttock wound, see photos, reviewed   Neurological:      Comments: Awake, follows commands, weak lower extremity movement       Medications  Current Facility-Administered Medications   Medication Dose Route Frequency Provider Last Rate Last Admin    oxyCODONE immediate-release (ROXICODONE) tablet 5 mg  5 mg Oral Q4HRS PRN Matthew Ye M.D.   5 mg at 01/10/23 0320    NS infusion   Intravenous Continuous Florian Pablo M.D. 83 mL/hr at 01/10/23 0200 Rate Verify at 01/10/23 0200    phenylephrine 40 mg/250 mL NS premix  0-5 mcg/kg/min (Ideal) Intravenous Continuous Patric Bansal M.D. 16.7 mL/hr at 01/10/23 0511 0.75 mcg/kg/min at 01/10/23 0511    vasopressin (Vasostrict) 20 Units in  mL Infusion  0.03 Units/min Intravenous Continuous Patric Bansal M.D.   Dose not Required at 01/09/23 1730    dakins 0.125% (1/4 strength) topical soln   Topical BID Patric Bansal M.D.   1 mL at 01/10/23 0507    piperacillin-tazobactam (Zosyn) 4.5 g in  mL IVPB  4.5 g Intravenous Q8HRS Patric Bansal M.D. 25 mL/hr at  01/10/23 0509 4.5 g at 01/10/23 0509    senna-docusate (PERICOLACE or SENOKOT S) 8.6-50 MG per tablet 2 Tablet  2 Tablet Oral BID Gabi Byrne M.D.   2 Tablet at 01/10/23 0507    And    polyethylene glycol/lytes (MIRALAX) PACKET 1 Packet  1 Packet Oral QDAY PRN Gabi Byrne M.D.        And    magnesium hydroxide (MILK OF MAGNESIA) suspension 30 mL  30 mL Oral QDAY PRN Gabi Byrne M.D.        And    bisacodyl (DULCOLAX) suppository 10 mg  10 mg Rectal QDAY PRN Gabi Byrne M.D.        lactated ringers infusion (BOLUS): BMI less than or equal to 30  30 mL/kg Intravenous Once PRN Gabi Byrne M.D.        acetaminophen (Tylenol) tablet 650 mg  650 mg Oral Q6HRS PRN Gabi Byrne M.D. MD Alert...Vancomycin per Pharmacy   Other PHARMACY TO DOSE Gabi Byrne M.D.        cyanocobalamin (VITAMIN B-12) tablet 500 mcg  500 mcg Oral DAILY Gabi Byrne M.D.   500 mcg at 01/10/23 0507    levothyroxine (SYNTHROID) tablet 50 mcg  50 mcg Oral AM ES Gabi Byrne M.D.   50 mcg at 01/10/23 0507    oxybutynin SR (DITROPAN-XL) tablet 10 mg  10 mg Oral BID Gabi Byrne M.D.   10 mg at 01/08/23 2353    vancomycin (VANCOCIN) 1,000 mg in  mL IVPB  1,000 mg Intravenous Q24HR Gabi Byrne M.D.   Stopped at 01/10/23 0237       Fluids    Intake/Output Summary (Last 24 hours) at 1/10/2023 0652  Last data filed at 1/10/2023 0600  Gross per 24 hour   Intake 5995.87 ml   Output 1230 ml   Net 4765.87 ml       Laboratory          Recent Labs     01/08/23  2016 01/09/23  0311 01/09/23  1531 01/09/23  1720 01/10/23  0436   SODIUM 139 138 139  --  137   POTASSIUM 4.1 4.1 4.2  --  4.3   CHLORIDE 111 109 110  --  108   CO2 19* 21 20  --  22   BUN 18 17 14  --  11   CREATININE 0.40* 0.42* 0.44*  --  0.45*   MAGNESIUM 1.6  --   --  1.7 1.8   PHOSPHORUS  --   --   --  2.4*  --    CALCIUM 6.9* 8.1* 7.4*  --  7.5*     Recent Labs     01/08/23 2016 01/09/23  0311 01/09/23  1531 01/10/23  0436   ALTSGPT 11 7  --  9    ASTSGOT 34 13  --  13   ALKPHOSPHAT 110* 111*  --  101*   TBILIRUBIN 0.3 0.3  --  0.3   GLUCOSE 88 99 118* 115*     Recent Labs     01/08/23 2016 01/09/23 0311 01/09/23  1531 01/10/23  0436   WBC 14.9* 12.4* 24.3* 13.5*   NEUTSPOLYS  --  81.80* 95.60* 83.30*   LYMPHOCYTES  --  6.10* 1.90* 7.80*   MONOCYTES  --  5.20 1.40 8.10   EOSINOPHILS  --  1.70 0.00 0.00   BASOPHILS  --  0.00 0.20 0.10   ASTSGOT 34 13  --  13   ALTSGPT 11 7  --  9   ALKPHOSPHAT 110* 111*  --  101*   TBILIRUBIN 0.3 0.3  --  0.3     Recent Labs     01/08/23  2016 01/09/23  0311 01/09/23  0949 01/09/23  1531 01/10/23  0436   RBC 2.65* 2.49*  --  2.28* 1.96*   HEMOGLOBIN 8.3* 7.8*  --  7.2* 6.0*   HEMATOCRIT 27.5* 25.9*  --  23.9* 20.2*   PLATELETCT 460* 504*  --  544* 521*   PROTHROMBTM 13.3  --   --   --   --    APTT 32.0  --   --   --   --    INR 1.02  --   --   --   --    IRON  --   --  20*  --   --    TOTIRONBC  --   --  218*  --   --        Imaging  X-Ray:  I have personally reviewed the images and compared with prior images.    Assessment/Plan  Sepsis  -Secondary to open distal right femur fracture   -Wound debridement with intramedullary femoral fixation and multiple culture sent 1/9  -Received crystalloid volume resuscitation, lactate 1.7, sepsis order set per hospitalist  -Initial blood cultures, wound cultures no growth today, follow-up repeat tissue/wound cultures  -Started on Jack-Synephrine in PACU for hypotension postoperatively  -Continue titrated vasopressors, consider stress dose hydrocortisone  -Continue broad-spectrum antibiotics with Zosyn/vancomycin, discuss with ID  -Wound care  -Cortisol quite low at 2.2, starting stress dose hydrocortisone today     Decubitus ulcer  -Right buttock/ischium, clean appearing   -Wound care     Paraplegia, secondary to transverse myelitis  -wheelchair-bound at baseline     Anemia  -Without evidence of acute blood loss  -Follow-up iron studies/anemia work-up     Goals of care/advance care  planning  -Discussed with patient further, family, palliative care  -Currently full CODE STATUS       VTE:  Lovenox  Ulcer: Not Indicated  Lines: None    I have performed a physical exam and reviewed and updated ROS and Plan today (1/10/2023). In review of yesterday's note (1/9/2023), there are no changes except as documented above.     Discussed patient condition and risk of morbidity and/or mortality with RN, RT, Pharmacy, and QA team  The patient remains critically ill.  Critical care time = 35 minutes in directly providing and coordinating critical care and extensive data review.  No time overlap and excludes procedures.

## 2023-01-10 NOTE — PROGRESS NOTES
Patient arrived to room S111 at 1607. Temperature 97.4F. Weight 70.1kg.    CHG bath complete    Belongings received upon admission to unit:  -pink sweatshirt  -white socks   -marie purse with green wallet, glasses in case     4 Eyes Skin Assessment Completed by Benjie RN and Anabel, RN.    Head WDL  Ears WDL  Nose WDL  Mouth WDL  Neck WDL  Breast/Chest WDL  Shoulder Blades WDL  Spine WDL  (R) Arm/Elbow/Hand Bruising  (L) Arm/Elbow/Hand Bruising  Abdomen WDL  Groin WDL  Scrotum/Coccyx/Buttocks pre-existing open wound to R buttocks, discoloration to coccyx  (R) Leg surgical dressing with ace wrap  (L) Leg WDL  (R) Heel/Foot/Toe Redness, Blanching, and Boggy  (L) Heel/Foot/Toe Redness, Blanching, and Boggy, bruising, scabs      Devices In Places Tele Box, Blood Pressure Cuff, Pulse Ox, Parker, and SCD's      Interventions In Place Gray Ear Foams, Sacral Mepilex, Q2 Turns, and Low Air Loss Mattress    Possible Skin Injury Yes    Pictures Uploaded Into Epic Yes  Wound Consult Placed Yes  RN Wound Prevention Protocol Ordered Yes

## 2023-01-10 NOTE — CARE PLAN
The patient is Watcher - Medium risk of patient condition declining or worsening    Shift Goals  Clinical Goals: Hemodynamic stability  Patient Goals: Rest  Family Goals: None present    Progress made toward(s) clinical / shift goals:  blood transfusions for hemoglobin instability     Problem: Knowledge Deficit - Standard  Goal: Patient and family/care givers will demonstrate understanding of plan of care, disease process/condition, diagnostic tests and medications  Outcome: Progressing     Problem: Skin Integrity  Goal: Skin integrity is maintained or improved  Outcome: Progressing     Problem: Fall Risk  Goal: Patient will remain free from falls  Outcome: Progressing     Problem: Pain - Standard  Goal: Alleviation of pain or a reduction in pain to the patient’s comfort goal  Outcome: Progressing       Patient is not progressing towards the following goals:

## 2023-01-10 NOTE — CARE PLAN
The patient is Stable - Low risk of patient condition declining or worsening    Shift Goals  Clinical Goals: Hemodynamic stability  Patient Goals: Rest  Family Goals: None present    Progress made toward(s) clinical / shift goals:    Problem: Knowledge Deficit - Standard  Goal: Patient and family/care givers will demonstrate understanding of plan of care, disease process/condition, diagnostic tests and medications  Outcome: Progressing  Note: Patient able to communicate needs effectively. Plan of care updated to patient and on whiteboard. All questions answered at this time.      Problem: Skin Integrity  Goal: Skin integrity is maintained or improved  Outcome: Progressing  Note: Q2 turns with TAPS, mepilex on all bony prominences, pillows for support/positioning, low airloss mattress in place for prevention of skin breakdown, appropriate wound protocols in place for sacral ulcer.     Problem: Fall Risk  Goal: Patient will remain free from falls  Outcome: Progressing  Note: Non-slip socks are on, bed is locked and in lowest position, personal belongings are within reach, room is free of clutter and spills, call light is in place. Patient educated on how to use the call light and how to call for assistance. Patient verbalized understanding.         Patient is not progressing towards the following goals:

## 2023-01-11 ENCOUNTER — APPOINTMENT (OUTPATIENT)
Dept: RADIOLOGY | Facility: MEDICAL CENTER | Age: 73
DRG: 853 | End: 2023-01-11
Attending: INTERNAL MEDICINE
Payer: MEDICARE

## 2023-01-11 LAB
ALBUMIN SERPL BCP-MCNC: 2.4 G/DL (ref 3.2–4.9)
ALBUMIN/GLOB SERPL: 0.9 G/DL
ALP SERPL-CCNC: 116 U/L (ref 30–99)
ALT SERPL-CCNC: 11 U/L (ref 2–50)
ANION GAP SERPL CALC-SCNC: 6 MMOL/L (ref 7–16)
AST SERPL-CCNC: 14 U/L (ref 12–45)
BACTERIA WND AEROBE CULT: ABNORMAL
BASOPHILS # BLD AUTO: 0.2 % (ref 0–1.8)
BASOPHILS # BLD: 0.02 K/UL (ref 0–0.12)
BILIRUB SERPL-MCNC: 0.3 MG/DL (ref 0.1–1.5)
BUN SERPL-MCNC: 11 MG/DL (ref 8–22)
CALCIUM ALBUM COR SERPL-MCNC: 8.7 MG/DL (ref 8.5–10.5)
CALCIUM SERPL-MCNC: 7.4 MG/DL (ref 8.5–10.5)
CHLORIDE SERPL-SCNC: 110 MMOL/L (ref 96–112)
CO2 SERPL-SCNC: 21 MMOL/L (ref 20–33)
CREAT SERPL-MCNC: 0.41 MG/DL (ref 0.5–1.4)
EOSINOPHIL # BLD AUTO: 0 K/UL (ref 0–0.51)
EOSINOPHIL NFR BLD: 0 % (ref 0–6.9)
ERYTHROCYTE [DISTWIDTH] IN BLOOD BY AUTOMATED COUNT: 65.5 FL (ref 35.9–50)
GFR SERPLBLD CREATININE-BSD FMLA CKD-EPI: 105 ML/MIN/1.73 M 2
GLOBULIN SER CALC-MCNC: 2.6 G/DL (ref 1.9–3.5)
GLUCOSE SERPL-MCNC: 119 MG/DL (ref 65–99)
GRAM STN SPEC: ABNORMAL
HCT VFR BLD AUTO: 25.3 % (ref 37–47)
HGB BLD-MCNC: 7.9 G/DL (ref 12–16)
IMM GRANULOCYTES # BLD AUTO: 0.05 K/UL (ref 0–0.11)
IMM GRANULOCYTES NFR BLD AUTO: 0.5 % (ref 0–0.9)
LYMPHOCYTES # BLD AUTO: 0.54 K/UL (ref 1–4.8)
LYMPHOCYTES NFR BLD: 5.7 % (ref 22–41)
MAGNESIUM SERPL-MCNC: 2.1 MG/DL (ref 1.5–2.5)
MCH RBC QN AUTO: 30.9 PG (ref 27–33)
MCHC RBC AUTO-ENTMCNC: 31.2 G/DL (ref 33.6–35)
MCV RBC AUTO: 98.8 FL (ref 81.4–97.8)
MONOCYTES # BLD AUTO: 0.41 K/UL (ref 0–0.85)
MONOCYTES NFR BLD AUTO: 4.3 % (ref 0–13.4)
MORPHOLOGY BLD-IMP: NORMAL
NEUTROPHILS # BLD AUTO: 8.46 K/UL (ref 2–7.15)
NEUTROPHILS NFR BLD: 89.3 % (ref 44–72)
NRBC # BLD AUTO: 0 K/UL
NRBC BLD-RTO: 0 /100 WBC
PHOSPHATE SERPL-MCNC: 2 MG/DL (ref 2.5–4.5)
PLATELET # BLD AUTO: 387 K/UL (ref 164–446)
PMV BLD AUTO: 8.9 FL (ref 9–12.9)
POTASSIUM SERPL-SCNC: 3.8 MMOL/L (ref 3.6–5.5)
PROT SERPL-MCNC: 5 G/DL (ref 6–8.2)
RBC # BLD AUTO: 2.56 M/UL (ref 4.2–5.4)
SIGNIFICANT IND 70042: ABNORMAL
SITE SITE: ABNORMAL
SODIUM SERPL-SCNC: 137 MMOL/L (ref 135–145)
SOURCE SOURCE: ABNORMAL
WBC # BLD AUTO: 9.5 K/UL (ref 4.8–10.8)

## 2023-01-11 PROCEDURE — 85025 COMPLETE CBC W/AUTO DIFF WBC: CPT

## 2023-01-11 PROCEDURE — A9270 NON-COVERED ITEM OR SERVICE: HCPCS | Performed by: INTERNAL MEDICINE

## 2023-01-11 PROCEDURE — 97162 PT EVAL MOD COMPLEX 30 MIN: CPT

## 2023-01-11 PROCEDURE — A9270 NON-COVERED ITEM OR SERVICE: HCPCS | Performed by: STUDENT IN AN ORGANIZED HEALTH CARE EDUCATION/TRAINING PROGRAM

## 2023-01-11 PROCEDURE — 99291 CRITICAL CARE FIRST HOUR: CPT | Performed by: INTERNAL MEDICINE

## 2023-01-11 PROCEDURE — 700105 HCHG RX REV CODE 258: Performed by: INTERNAL MEDICINE

## 2023-01-11 PROCEDURE — 700102 HCHG RX REV CODE 250 W/ 637 OVERRIDE(OP): Performed by: STUDENT IN AN ORGANIZED HEALTH CARE EDUCATION/TRAINING PROGRAM

## 2023-01-11 PROCEDURE — 80053 COMPREHEN METABOLIC PANEL: CPT

## 2023-01-11 PROCEDURE — 700102 HCHG RX REV CODE 250 W/ 637 OVERRIDE(OP): Performed by: INTERNAL MEDICINE

## 2023-01-11 PROCEDURE — 83735 ASSAY OF MAGNESIUM: CPT

## 2023-01-11 PROCEDURE — 770022 HCHG ROOM/CARE - ICU (200)

## 2023-01-11 PROCEDURE — 84100 ASSAY OF PHOSPHORUS: CPT

## 2023-01-11 PROCEDURE — 700101 HCHG RX REV CODE 250: Performed by: INTERNAL MEDICINE

## 2023-01-11 PROCEDURE — 71045 X-RAY EXAM CHEST 1 VIEW: CPT

## 2023-01-11 PROCEDURE — 97166 OT EVAL MOD COMPLEX 45 MIN: CPT

## 2023-01-11 PROCEDURE — 700111 HCHG RX REV CODE 636 W/ 250 OVERRIDE (IP): Performed by: INTERNAL MEDICINE

## 2023-01-11 RX ORDER — FUROSEMIDE 10 MG/ML
20 INJECTION INTRAMUSCULAR; INTRAVENOUS
Status: DISCONTINUED | OUTPATIENT
Start: 2023-01-11 | End: 2023-01-17

## 2023-01-11 RX ORDER — POTASSIUM CHLORIDE 20 MEQ/1
40 TABLET, EXTENDED RELEASE ORAL 2 TIMES DAILY
Status: COMPLETED | OUTPATIENT
Start: 2023-01-11 | End: 2023-01-12

## 2023-01-11 RX ADMIN — OXYBUTYNIN CHLORIDE 10 MG: 10 TABLET, EXTENDED RELEASE ORAL at 05:54

## 2023-01-11 RX ADMIN — OXYCODONE HYDROCHLORIDE 5 MG: 5 TABLET ORAL at 20:49

## 2023-01-11 RX ADMIN — HYDROCORTISONE SODIUM SUCCINATE 50 MG: 100 INJECTION, POWDER, FOR SOLUTION INTRAMUSCULAR; INTRAVENOUS at 05:55

## 2023-01-11 RX ADMIN — DOCUSATE SODIUM 50 MG AND SENNOSIDES 8.6 MG 2 TABLET: 8.6; 5 TABLET, FILM COATED ORAL at 18:16

## 2023-01-11 RX ADMIN — PIPERACILLIN AND TAZOBACTAM 4.5 G: 4; .5 INJECTION, POWDER, LYOPHILIZED, FOR SOLUTION INTRAVENOUS; PARENTERAL at 05:20

## 2023-01-11 RX ADMIN — HYDROCORTISONE SODIUM SUCCINATE 50 MG: 100 INJECTION, POWDER, FOR SOLUTION INTRAMUSCULAR; INTRAVENOUS at 18:16

## 2023-01-11 RX ADMIN — SODIUM HYPOCHLORITE 1 ML: 1.25 SOLUTION TOPICAL at 05:54

## 2023-01-11 RX ADMIN — FUROSEMIDE 20 MG: 10 INJECTION, SOLUTION INTRAMUSCULAR; INTRAVENOUS at 15:15

## 2023-01-11 RX ADMIN — POTASSIUM CHLORIDE 40 MEQ: 1500 TABLET, EXTENDED RELEASE ORAL at 09:13

## 2023-01-11 RX ADMIN — POTASSIUM PHOSPHATE, MONOBASIC AND POTASSIUM PHOSPHATE, DIBASIC 15 MMOL: 224; 236 INJECTION, SOLUTION, CONCENTRATE INTRAVENOUS at 09:16

## 2023-01-11 RX ADMIN — CYANOCOBALAMIN TAB 500 MCG 500 MCG: 500 TAB at 05:54

## 2023-01-11 RX ADMIN — PIPERACILLIN AND TAZOBACTAM 4.5 G: 4; .5 INJECTION, POWDER, LYOPHILIZED, FOR SOLUTION INTRAVENOUS; PARENTERAL at 12:46

## 2023-01-11 RX ADMIN — HYDROCORTISONE SODIUM SUCCINATE 50 MG: 100 INJECTION, POWDER, FOR SOLUTION INTRAMUSCULAR; INTRAVENOUS at 12:45

## 2023-01-11 RX ADMIN — ENOXAPARIN SODIUM 40 MG: 40 INJECTION SUBCUTANEOUS at 18:16

## 2023-01-11 RX ADMIN — SODIUM CHLORIDE: 9 INJECTION, SOLUTION INTRAVENOUS at 02:07

## 2023-01-11 RX ADMIN — LEVOTHYROXINE SODIUM 50 MCG: 0.05 TABLET ORAL at 05:54

## 2023-01-11 RX ADMIN — OXYCODONE HYDROCHLORIDE 5 MG: 5 TABLET ORAL at 05:54

## 2023-01-11 RX ADMIN — OXYCODONE HYDROCHLORIDE 5 MG: 5 TABLET ORAL at 10:24

## 2023-01-11 RX ADMIN — OXYCODONE HYDROCHLORIDE 5 MG: 5 TABLET ORAL at 15:08

## 2023-01-11 RX ADMIN — FUROSEMIDE 20 MG: 10 INJECTION, SOLUTION INTRAMUSCULAR; INTRAVENOUS at 09:14

## 2023-01-11 RX ADMIN — DOCUSATE SODIUM 50 MG AND SENNOSIDES 8.6 MG 2 TABLET: 8.6; 5 TABLET, FILM COATED ORAL at 05:54

## 2023-01-11 RX ADMIN — POTASSIUM CHLORIDE 40 MEQ: 1500 TABLET, EXTENDED RELEASE ORAL at 18:17

## 2023-01-11 RX ADMIN — VANCOMYCIN HYDROCHLORIDE 750 MG: 500 INJECTION, POWDER, LYOPHILIZED, FOR SOLUTION INTRAVENOUS at 06:50

## 2023-01-11 RX ADMIN — OXYCODONE HYDROCHLORIDE 5 MG: 5 TABLET ORAL at 02:01

## 2023-01-11 ASSESSMENT — COGNITIVE AND FUNCTIONAL STATUS - GENERAL
MOBILITY SCORE: 7
STANDING UP FROM CHAIR USING ARMS: TOTAL
DRESSING REGULAR LOWER BODY CLOTHING: A LITTLE
CLIMB 3 TO 5 STEPS WITH RAILING: TOTAL
HELP NEEDED FOR BATHING: A LOT
MOVING FROM LYING ON BACK TO SITTING ON SIDE OF FLAT BED: A LOT
TURNING FROM BACK TO SIDE WHILE IN FLAT BAD: A LOT
DAILY ACTIVITIY SCORE: 17
SUGGESTED CMS G CODE MODIFIER DAILY ACTIVITY: CK
STANDING UP FROM CHAIR USING ARMS: TOTAL
SUGGESTED CMS G CODE MODIFIER MOBILITY: CM
MOVING TO AND FROM BED TO CHAIR: UNABLE
CLIMB 3 TO 5 STEPS WITH RAILING: TOTAL
DAILY ACTIVITIY SCORE: 20
TURNING FROM BACK TO SIDE WHILE IN FLAT BAD: A LOT
MOVING FROM LYING ON BACK TO SITTING ON SIDE OF FLAT BED: UNABLE
WALKING IN HOSPITAL ROOM: TOTAL
MOVING TO AND FROM BED TO CHAIR: A LOT
MOBILITY SCORE: 9
DRESSING REGULAR UPPER BODY CLOTHING: A LITTLE
TOILETING: A LOT
DRESSING REGULAR LOWER BODY CLOTHING: A LOT
WALKING IN HOSPITAL ROOM: TOTAL
TOILETING: A LOT
SUGGESTED CMS G CODE MODIFIER MOBILITY: CM
SUGGESTED CMS G CODE MODIFIER DAILY ACTIVITY: CJ
HELP NEEDED FOR BATHING: A LITTLE

## 2023-01-11 ASSESSMENT — PAIN DESCRIPTION - PAIN TYPE
TYPE: ACUTE PAIN

## 2023-01-11 ASSESSMENT — GAIT ASSESSMENTS: GAIT LEVEL OF ASSIST: UNABLE TO PARTICIPATE

## 2023-01-11 ASSESSMENT — ACTIVITIES OF DAILY LIVING (ADL): TOILETING: REQUIRES ASSIST

## 2023-01-11 NOTE — PROGRESS NOTES
4 Eyes Skin Assessment Completed by CAREN Galeano and CAREN Juarez.    Head WDL  Ears WDL  Nose WDL  Mouth WDL  Neck WDL  Breast/Chest WDL  Shoulder Blades WDL  Spine WDL  (R) Arm/Elbow/Hand Bruising  (L) Arm/Elbow/Hand Bruising  Abdomen WDL  Groin Incision right groin  Scrotum/Coccyx/Buttocks tunneling wound  (R) Leg Swelling  (L) Leg Swelling  (R) Heel/Foot/Toe DTI  (L) Heel/Foot/Toe Bruising DTI          Devices In Places ECG, Blood Pressure Cuff, Pulse Ox, Parker, SCD's, and Oxy Mask      Interventions In Place Pillows, Q2 Turns, Low Air Loss Mattress, and Heels Loaded W/Pillows    Possible Skin Injury Yes    Pictures Uploaded Into Epic Yes  Wound Consult Placed Yes  RN Wound Prevention Protocol Ordered Yes

## 2023-01-11 NOTE — CARE PLAN
The patient is Watcher - Medium risk of patient condition declining or worsening    Shift Goals  Clinical Goals: Hemodynamic stability  Patient Goals: Rest  Family Goals: ABDIRASHID    Progress made toward(s) clinical / shift goals:      Problem: Fall Risk  Goal: Patient will remain free from falls  Outcome: Met     Problem: Knowledge Deficit - Standard  Goal: Patient and family/care givers will demonstrate understanding of plan of care, disease process/condition, diagnostic tests and medications  Outcome: Progressing     Problem: Skin Integrity  Goal: Skin integrity is maintained or improved  Outcome: Progressing     Problem: Pain - Standard  Goal: Alleviation of pain or a reduction in pain to the patient’s comfort goal  Outcome: Progressing

## 2023-01-11 NOTE — PROGRESS NOTES
"   Orthopaedic Progress Note    Interval changes:  Patient doing well post op   Dressings CDI    ROS - Patient denies any new issues.  Pain well controlled.    BP 99/54   Pulse 83   Temp 36.7 °C (98.1 °F)   Resp 20   Ht 1.676 m (5' 6\")   Wt 70.2 kg (154 lb 12.2 oz)   SpO2 90%       Patient seen and examined  No acute distress  Breathing non labored  RRR  RLE dressings CDI, no issues.     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  Wound debridement with multiple soft tissue and bone cultures.  2.  Retrograde intramedullary femoral fixation with cement supplementation  Wt bearing status - WBAT  Wound care/Drains - Dressings to be changed every other day by nursing  Future Procedures - none planned   Lovenox: Start 1/10, Duration-until ambulatory > 150'  Sutures/Staples out- 14 days post operatively  PT/OT-initiated  Antibiotics: zosyn 4.5g IV q8   DVT Prophylaxis- TEDS/SCDs/Foot pumps  Parker-none  Case Coordination for Discharge Planning - Disposition home   "

## 2023-01-11 NOTE — THERAPY
Physical Therapy   Initial Evaluation     Patient Name: Dyana Conway  Age:  72 y.o., Sex:  female  Medical Record #: [id number]  Today's Date: 1/11/2023     Precautions  Precautions: Fall Risk;Weight Bearing As Tolerated Right Lower Extremity    Assessment  Patient is 72 y.o. female admitted with increasing R knee pain & swelling since she fell out of her wheelchair on 12/22/22.  Patient now POD #2 wound debridement and retrograde intramedullary femoral fixation for R distal femur fracture.  PMH includes paraplegia secondary to transverse myelitis and encephalitis (wheelchair use for 38 years) & coccygeal pressure ulcer for ~3 years.  Today patient presented with impaired strength, balance, activity tolerance, and safety awareness.  She mobilized as detailed below with grossly SBA-min A.  Planned for transfer to chair however RLE surgical dressing began to leak blood.  Patient requires assistance from her  for transfers and ADLs however reported it is becoming more difficult for him to assist her.  Will continue to follow for acute skilled PT, recommend post acute placement.     Plan    Physical Therapy Initial Treatment Plan   Treatment Plan : Bed Mobility, Equipment, Gait Training, Neuro Re-Education / Balance, Self Care / Home Evaluation, Stair Training, Therapeutic Activities, Therapeutic Exercise  Treatment Frequency: 3 Times per Week  Duration: Until Therapy Goals Met    DC Equipment Recommendations: Unable to determine at this time  Discharge Recommendations: Recommend post-acute placement for additional physical therapy services prior to discharge home     Objective     01/11/23 7476   Precautions   Precautions Fall Risk;Weight Bearing As Tolerated Right Lower Extremity   Pain 0 - 10 Group   Therapist Pain Assessment Post Activity Pain Same as Prior to Activity;Nurse Notified   Prior Living Situation   Prior Services Continuous (24 Hour) Care Giving Family   Housing / Facility Mobile Home   Steps  "Into Home (ramp)   Equipment Owned Wheelchair   Lives with - Patient's Self Care Capacity Spouse   Comments Pt reported she owns walkers, canes, wheelchairs, etc. but primarily uses manual WC.   Prior Level of Functional Mobility   Bed Mobility Independent   Transfer Status Required Assist   Wheelchair Independent   Comments Pt reported she has not ambulated in at least 3 years, has primarily used WC since she had transverse myelitis as a child.  Reported her  assists with transfers in/out of WC. Stated  has to \"pick me up and put me on the pot.\"   History of Falls   History of Falls Yes   Cognition    Cognition / Consciousness X   Level of Consciousness Alert   Comments Cooperative   Passive ROM Lower Body   Passive ROM Lower Body WDL   Comments slightly limited by tightness   Active ROM Lower Body    Active ROM Lower Body  X   Comments able to extend L knee to ~15-20 degrees from full extension, able to DF and PF B ankles in limited ROM   Strength Lower Body   Lower Body Strength  X   Comments L knee ext 3-/5, able to DF and PF B ankles in limited ROM. RLE NT due to pain   Balance Assessment   Sitting Balance (Static) Fair   Sitting Balance (Dynamic) Fair -   Weight Shift Sitting Fair   Bed Mobility    Supine to Sit Minimal Assist   Sit to Supine Moderate Assist (min-mod)   Scooting Standby Assist (seated EOB)   Rolling Minimum Assist to Lt.   Gait Analysis   Gait Level Of Assist Unable to Participate   Functional Mobility   Sit to Stand Unable to Participate   Comments Planned to transfer to chair, deferred due to pain & RLE dressing leaking   Activity Tolerance   Sitting Edge of Bed 10 min +   Short Term Goals    Short Term Goal # 1 Pt will perform supine <> sit without bed features with SPV in 6 visits to progress bed mobility   Short Term Goal # 2 Pt will perform squat pivot transfer to  with min A in 6 visits to progress OOB mobility   Short Term Goal # 3 Pt will propel  ft with SPV in 6 " visits to progress functional independence   Education Group   Education Provided Role of Physical Therapist;Weight Bearing Precautions   Role of Physical Therapist Patient Response Patient;Acceptance;Explanation;Verbal Demonstration   Weight Bearing Precautions Patient Response Patient;Acceptance;Explanation;Verbal Demonstration   Physical Therapy Initial Treatment Plan    Treatment Plan  Bed Mobility;Equipment;Gait Training;Neuro Re-Education / Balance;Self Care / Home Evaluation;Stair Training;Therapeutic Activities;Therapeutic Exercise   Treatment Frequency 3 Times per Week   Duration Until Therapy Goals Met   Problem List    Problems Pain;Impaired Bed Mobility;Impaired Transfers;Impaired Balance;Functional Strength Deficit;Decreased Activity Tolerance   Anticipated Discharge Equipment and Recommendations   DC Equipment Recommendations Unable to determine at this time   Discharge Recommendations Recommend post-acute placement for additional physical therapy services prior to discharge home

## 2023-01-11 NOTE — PROGRESS NOTES
Ortho Progress Note    S: ANTHONY. Pain is well controlled.     O:  Vitals:    01/11/23 0800   BP: 106/65   Pulse: 66   Resp: 13   Temp:    SpO2: 93%          Physical Exam:  Gen: Aox3  Resp: Stable on room air, unlabored respirations        RLE  Dressings clean dry and intact, compartments soft and compressible  EHL and FHL function present  Minimal ankle DF, chronic weakeness  Brisk capillary refill present to all toes    Recent Labs     01/09/23  0311 01/09/23  1531 01/10/23  0436 01/10/23  1551 01/11/23  0525   WBC 12.4* 24.3* 13.5* 10.7 9.5   RBC 2.49* 2.28* 1.96* 2.50* 2.56*   HEMOGLOBIN 7.8* 7.2* 6.0* 7.8* 7.9*   HEMATOCRIT 25.9* 23.9* 20.2* 25.0* 25.3*   .0* 104.8* 103.1* 100.0* 98.8*   MCH 31.3 31.6 30.6 31.2 30.9   RDW 69.2* 69.5* 67.0* 64.4* 65.5*   PLATELETCT 504* 544* 521* 398 387   MPV 8.8* 8.7* 8.8* 8.8* 8.9*   NEUTSPOLYS 81.80* 95.60* 83.30*  --  89.30*   LYMPHOCYTES 6.10* 1.90* 7.80*  --  5.70*   MONOCYTES 5.20 1.40 8.10  --  4.30   EOSINOPHILS 1.70 0.00 0.00  --  0.00   BASOPHILS 0.00 0.20 0.10  --  0.20   RBCMORPHOLO Present  --   --   --   --        Recent Labs     01/09/23  1531 01/10/23  0436 01/11/23  0525   SODIUM 139 137 137   POTASSIUM 4.2 4.3 3.8   CHLORIDE 110 108 110   CO2 20 22 21   GLUCOSE 118* 115* 119*   BUN 14 11 11         Assessment: 72 y.o. female s/p     PREOPERATIVE DIAGNOSIS: (Two-week-old (subacute) right type I distal femur fracture.     POSTOPERATIVE DIAGNOSIS:  Two-week-old right type I distal femur fracture.     OPERATIONS PERFORMED:  1.  Wound debridement with multiple soft tissue and bone cultures.  2.  Retrograde intramedullary femoral fixation with cement supplementation.      Plan:  Weight bearing status: WBAT RLE  Abx: Vanc zosyn   Cultures growing:  Culture Result  Abnormal   Escherichia coli   Rare growth   See previous culture for sensitivity report.     Consult ID  Will need longer term abx acutely followed by chronic suppression  Dispo:  Pending clinical  improvement

## 2023-01-11 NOTE — WOUND TEAM
[facility]  Inpatient Services  Initial Wound and Skin Care Evaluation    Admission Date: 1/8/2023     Last order of IP CONSULT TO WOUND CARE was found on 1/9/2023 from Hospital Encounter on 1/8/2023     HPI, PMH, SH: Reviewed    Past Surgical History:   Procedure Laterality Date    IRRIGATION & DEBRIDEMENT GENERAL Right 1/9/2023    Procedure: IRRIGATION AND DEBRIDEMENT, WOUND;  Surgeon: Handy Chapa M.D.;  Location: SURGERY [facility];  Service: Orthopedics    ORIF, FRACTURE, FEMUR Right 1/9/2023    Procedure: INTRAMEDULLARY NAIL FIXATION OF RIGHT FEMUR  WITH CEMENT SUPPLEMENTATION;  Surgeon: Handy Chapa M.D.;  Location: SURGERY [facility];  Service: Orthopedics    ABDOMINAL EXPLORATION       Social History     Tobacco Use    Smoking status: Heavy Smoker     Packs/day: 0.50     Years: 15.00     Pack years: 7.50     Types: Cigarettes    Smokeless tobacco: Not on file   Substance Use Topics    Alcohol use: No     Chief Complaint   Patient presents with    Trauma Green     Transfer from [facility]     Diagnosis: Sepsis (HCC) [A41.9]    Unit where seen by Wound Team: S111/00     WOUND CONSULT/FOLLOW UP RELATED TO:  Right I.T. 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 Pt has a right I.T. wound which she states she has had for a significant amount of time. Pt fell out of her wheelchair on 12/22/22 and has since had a swollen knee since. On 1/8/23 pt noticed fluid draining from a wound to her knee and therefore contacted EMS. Pt was taken to OR for I&D of wound and repair of femur fracture. Wound team was consulted to manage Right I.T. wound.     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)   Wound Image     01/10/23 1700   Site Assessment Pink;Red    Periwound Assessment Maceration    Margins Unattached edges;Epibole (rolled edges)    Closure None    Drainage Amount Scant     Drainage Description Serosanguineous    Treatments Cleansed;Offloading;Site care    Wound Cleansing Approved Wound Cleanser    Periwound Protectant Skin Protectant Wipes to Periwound    Dressing Cleansing/Solutions 1/4 Strength Dakin's Solution    Dressing Options Moist Roll Gauze;Mepilex    Dressing Changed Changed    Dressing Status Clean;Dry;Intact    Dressing Change/Treatment Frequency Every Shift, and As Needed    NEXT Dressing Change/Treatment Date 01/11/23    NEXT Weekly Photo (Inpatient Only) 01/17/23    WOUND NURSE ONLY - Pressure Injury Stage 4    Wound Length (cm) 6.7 cm    Wound Width (cm) 3.7 cm    Wound Depth (cm) 2.4 cm    Wound Surface Area (cm^2) 24.79 cm^2    Wound Volume (cm^3) 59.496 cm^3    Undermining (cm) 4 cm    Undermining of Wound, 1st Location From 10 o'clock    WOUND NURSE ONLY - Time Spent with Patient (mins) 60      Vascular:    FLAVIO:   No results found.    Lab Values:    Lab Results   Component Value Date/Time    WBC 10.7 01/10/2023 03:51 PM    RBC 2.50 (L) 01/10/2023 03:51 PM    HEMOGLOBIN 7.8 (L) 01/10/2023 03:51 PM    HEMATOCRIT 25.0 (L) 01/10/2023 03:51 PM      Culture Results show:  No results found for this or any previous visit (from the past 720 hour(s)).    Pain Level/Medicated:  Denies pain       INTERVENTIONS BY WOUND TEAM:  Chart and images reviewed. Discussed with bedside RN. All areas of concern (based on picture review, LDA review and discussion with bedside RN) have been thoroughly assessed. Documentation of areas based on significant findings. This RN in to assess patient. Performed standard wound care which includes appropriate positioning, dressing removal and non-selective debridement. Pictures and measurements obtained weekly if/when required.  Preparation for Dressing removal: Dressing removed without difficulty  Non-selectively Debrided with:  Wound cleanser and gauze.  Sharp debridement: NA  Adrianna wound: Cleansed with wound cleanser, Prepped with barrier  paste  Primary Dressing: Dakins moistened roll gauze  Secondary (Outer) Dressing: mepilex    Bilateral heels intact, some bruising noted to the left heel. Heel mepilex applied    Interdisciplinary consultation: Patient, Bedside RN, Susan BARILLAS (Wound RN)    EVALUATION / RATIONALE FOR TREATMENT:  Most Recent Date:  01/10/23: Pt with open wound to Right I.T., pt has had the wound for significant amount of time. Would benefit from surgical debridement to open wound edges and allow better access to wound depth for potential vac placement. Discussed with bedside RN.     Goals: Steady decrease in wound area and depth weekly.    WOUND TEAM PLAN OF CARE ([X] for frequency of wound follow up,):   Nursing to follow dressing orders written for wound care. Contact wound team if area fails to progress, deteriorates or with any questions/concerns if something comes up before next scheduled follow up (See below as to whether wound is following and frequency of wound follow up)  Dressing changes by wound team:                   Follow up 3 times weekly:                NPWT change 3 times weekly:     Follow up 1-2 times weekly:    X  Follow up Bi-Monthly:           Follow up Monthly (High Risk):                        Follow up as needed:     Other (explain):     NURSING PLAN OF CARE ORDERS (X):  Dressing changes: See Dressing Care orders: X  Skin care: See Skin Care orders:   RN Prevention Protocol:   Rectal tube care: See Rectal Tube Care orders:   Other orders:    RSKIN:   CURRENTLY IN PLACE (X), APPLIED THIS VISIT (A), ORDERED (O):   Q shift Greg:  X  Q shift pressure point assessments:  X    Surface/Positioning   Pressure redistribution mattress            Low Airloss          ICU Low Airloss X  Bariatric NATI     Waffle cushion        Waffle Overlay       X   Reposition q 2 hours  X    TAPs Turning system   X  Z Jorge Pillow     Offloading/Redistribution   Sacral Mepilex (Silicone dressing)   X  Heel Mepilex (Silicone dressing)        A  Heel float boots (Prevalon boot)             Float Heels off Bed with Pillows           Respiratory   Silicone O2 tubing     X    Gray Foam Ear protectors   X  Cannula fixation Device (Tender )          High flow offloading Clip    Elastic head band offloading device      Anchorfast                                                         Trach with Optifoam split foam             Containment/Moisture Prevention     Rectal tube or BMS    Purwick/Condom Cath        Parker Catheter  X  Barrier wipes           Barrier paste       Antifungal tx      Interdry        Mobilization       Up to chair        Ambulate      PT/OT      Nutrition       Dietician        Diabetes Education      PO   X  TF     TPN     NPO   # days     Other        Anticipated discharge plans:   LTACH:        SNF/Rehab:                  Home Health Care:    X       Outpatient Wound Center:            Self/Family Care:        Other:                  Vac Discharge Needs:   Not Applicable Pt not on a wound vac:     X  Regular Vac while inpatient, alternative dressing at DC:        Regular Vac in use and continued at DC:            Reg. Vac w/ Skin Sub/Biologic in use. Will need to be changed 2x wkly:      Veraflo Vac while inpatient, ok to transition to Regular Vac on Discharge:           Veraflo Vac while inpatient, will need to remain on Veraflo Vac upon discharge:

## 2023-01-11 NOTE — PROGRESS NOTES
Pharmacy Vancomycin Kinetics Note for 1/10/2023     72 y.o. female on Vancomycin day # 3     Vancomycin Indication (Two level/Trough based Dosing): Osteomyelitis (goal trough 10-15)      Active Antibiotics (From admission, onward)      Ordered     Ordering Provider       Tue Lit 10, 2023  5:49 PM    01/10/23 1749  vancomycin (VANCOCIN) 750 mg in  mL IVPB  (vancomycin (VANCOCIN) IV (LD + Maintenance))  EVERY 12 HOURS         Patric Bansal M.D.       Mon Jan 9, 2023  5:23 PM    01/09/23 1723  piperacillin-tazobactam (Zosyn) 4.5 g in  mL IVPB  (piperacillin-tazobactam (ZOSYN) IV (Extended-infusion) PANEL )  EVERY 8 HOURS        See [facility] for full Linked Orders Report.    Patric Bansal M.D.       Sun Jan 8, 2023 10:36 PM    01/08/23 2236  MD Alert...Vancomycin per Pharmacy  PHARMACY TO DOSE        Question:  Indication(s) for vancomycin?  Answer:  Osteomyelitis    Gabi Byrne M.D.            Dosing Weight:        Admission History: Admitted on 1/8/2023 for Sepsis (HCC) [A41.9]  Pertinent history: Pt presents following GLF from WC. CT of knee shows open wound with small amount of gas, concerning for osteomyelitis. Orthopedic surgery consulting, broad spectrum antibiotics initiated in anticipation of surgery.    Allergies:     Doxycycline, Macrodantin [nitrofurantoin], and Sulfa drugs     Pertinent cultures to date:     Results       Procedure Component Value Units Date/Time    CULTURE TISSUE W/ GRM STAIN [[id number]] Collected: 01/09/23 1135    Order Status: Completed Specimen: Tissue Updated: 01/10/23 1530     Significant Indicator NEG     Source TISS     Site DISTAL FEMUR TISSUE     Culture Result No growth at 24 hours.     Gram Stain Result Rare WBCs.  No organisms seen.      Narrative:      Surgery Specimen    Anaerobic Culture [[id number]] Collected: 01/09/23 1135    Order Status: Completed Specimen: Tissue Updated: 01/10/23 1530     Significant Indicator NEG     Source TISS     Site DISTAL  FEMUR TISSUE     Culture Result Culture in progress.    Narrative:      Surgery Specimen    Fungal Culture [[id number]] Collected: 01/09/23 1135    Order Status: Completed Specimen: Tissue Updated: 01/10/23 1530     Significant Indicator NEG     Source TISS     Site DISTAL FEMUR TISSUE     Culture Result Culture in progress.     Fungal Smear Results No fungal elements seen.    Narrative:      Surgery Specimen    AFB Culture [[id number]] Collected: 01/09/23 1135    Order Status: Completed Specimen: Tissue Updated: 01/10/23 1530     Significant Indicator NEG     Source TISS     Site DISTAL FEMUR TISSUE     Culture Result Culture in progress.     AFB Smear Results -    Narrative:      Surgery Specimen    Fungal Culture [[id number]] Collected: 01/09/23 1135    Order Status: Completed Specimen: Wound Updated: 01/10/23 1444     Significant Indicator NEG     Source WND     Site DISTAL FEMUR #3     Culture Result Culture in progress.     Fungal Smear Results No fungal elements seen.    Narrative:      Surgery - swabs received    CULTURE WOUND W/ GRAM STAIN [[id number]]  (Abnormal) Collected: 01/09/23 1135    Order Status: Completed Specimen: Wound Updated: 01/10/23 1444     Significant Indicator POS     Source WND     Site DISTAL FEMUR #3     Culture Result -     Gram Stain Result Few WBCs.  No organisms seen.       Culture Result Escherichia coli  Rare growth        Enterococcus faecalis  Rare growth      Narrative:      Surgery - swabs received    Anaerobic Culture [[id number]] Collected: 01/09/23 1135    Order Status: Completed Specimen: Wound Updated: 01/10/23 1444     Significant Indicator NEG     Source WND     Site DISTAL FEMUR #3     Culture Result Culture in progress.    Narrative:      Surgery - swabs received    Anaerobic Culture [[id number]] Collected: 01/09/23 1135    Order Status: Completed Specimen: Wound Updated: 01/10/23 1443     Significant Indicator NEG     Source WND     Site DISTAL FEMUR #2     Culture Result  Culture in progress.    Narrative:      Surgery - swabs received    Fungal Culture [[id number]] Collected: 01/09/23 1135    Order Status: Completed Specimen: Wound Updated: 01/10/23 1443     Significant Indicator NEG     Source WND     Site DISTAL FEMUR #2     Culture Result Culture in progress.     Fungal Smear Results No fungal elements seen.    Narrative:      Surgery - swabs received    CULTURE WOUND W/ GRAM STAIN [[id number]]  (Abnormal) Collected: 01/09/23 1135    Order Status: Completed Specimen: Wound Updated: 01/10/23 1443     Significant Indicator POS     Source WND     Site DISTAL FEMUR #2     Culture Result -     Gram Stain Result Few WBCs.  No organisms seen.       Culture Result Escherichia coli  Light growth        Enterococcus faecalis  Rare growth      Narrative:      Surgery - swabs received    Fungal Culture [[id number]] Collected: 01/09/23 1135    Order Status: Completed Specimen: Wound Updated: 01/10/23 1443     Significant Indicator NEG     Source WND     Site DISTAL FEMUR #1     Culture Result Culture in progress.     Fungal Smear Results No fungal elements seen.    Narrative:      Surgery - swabs received    CULTURE WOUND W/ GRAM STAIN [[id number]]  (Abnormal) Collected: 01/09/23 1135    Order Status: Completed Specimen: Wound Updated: 01/10/23 1443     Significant Indicator POS     Source WND     Site DISTAL FEMUR #1     Culture Result -     Gram Stain Result Few WBCs.  No organisms seen.       Culture Result Escherichia coli  Rare growth        Enterococcus faecalis  Rare growth      Narrative:      Surgery - swabs received    Anaerobic Culture [[id number]] Collected: 01/09/23 1135    Order Status: Completed Specimen: Wound Updated: 01/10/23 1443     Significant Indicator NEG     Source WND     Site DISTAL FEMUR #1     Culture Result Culture in progress.    Narrative:      Surgery - swabs received    CULTURE WOUND W/ GRAM STAIN [[id number]]  (Abnormal) Collected: 01/08/23 2212    Order Status:  Completed Specimen: Wound from Right Leg Updated: 01/10/23 1441     Significant Indicator POS     Source WND     Site Right Knee     Culture Result Rare growth usual skin domi.     Gram Stain Result Rare WBCs.  No organisms seen.       Culture Result Escherichia coli  Light growth        Enterococcus faecalis  Light growth      Narrative:      Right knee  Right knee    Fungal Smear [[id number]] Collected: 01/09/23 1135    Order Status: Completed Specimen: Tissue Updated: 01/09/23 2354     Significant Indicator NEG     Source TISS     Site DISTAL FEMUR TISSUE     Fungal Smear Results No fungal elements seen.    Narrative:      Surgery Specimen    GRAM STAIN [[id number]] Collected: 01/09/23 1135    Order Status: Completed Specimen: Tissue Updated: 01/09/23 2354     Significant Indicator .     Source TISS     Site DISTAL FEMUR TISSUE     Gram Stain Result Rare WBCs.  No organisms seen.      Narrative:      Surgery Specimen    Fungal Smear [[id number]] Collected: 01/09/23 1135    Order Status: Completed Specimen: Wound Updated: 01/09/23 2000     Significant Indicator NEG     Source WND     Site DISTAL FEMUR #3     Fungal Smear Results No fungal elements seen.    Narrative:      Surgery - swabs received    GRAM STAIN [[id number]] Collected: 01/09/23 1135    Order Status: Completed Specimen: Wound Updated: 01/09/23 2000     Significant Indicator .     Source WND     Site DISTAL FEMUR #3     Gram Stain Result Few WBCs.  No organisms seen.      Narrative:      Surgery - swabs received    Fungal Smear [[id number]] Collected: 01/09/23 1135    Order Status: Completed Specimen: Wound Updated: 01/09/23 2000     Significant Indicator NEG     Source WND     Site DISTAL FEMUR #2     Fungal Smear Results No fungal elements seen.    Narrative:      Surgery - swabs received    GRAM STAIN [[id number]] Collected: 01/09/23 1135    Order Status: Completed Specimen: Wound Updated: 01/09/23 2000     Significant Indicator .     Source WND      "Site DISTAL FEMUR #2     Gram Stain Result Few WBCs.  No organisms seen.      Narrative:      Surgery - swabs received    GRAM STAIN [[id number]] Collected: 01/09/23 1135    Order Status: Completed Specimen: Wound Updated: 01/09/23 1942     Significant Indicator .     Source WND     Site DISTAL FEMUR #1     Gram Stain Result Few WBCs.  No organisms seen.      Narrative:      Surgery - swabs received    Fungal Smear [[id number]] Collected: 01/09/23 1135    Order Status: Completed Specimen: Wound Updated: 01/09/23 1942     Significant Indicator NEG     Source WND     Site DISTAL FEMUR #1     Fungal Smear Results No fungal elements seen.    Narrative:      Surgery - swabs received    BLOOD CULTURE [[id number]] Collected: 01/08/23 2201    Order Status: Completed Specimen: Blood from Peripheral Updated: 01/09/23 0830     Significant Indicator NEG     Source BLD     Site PERIPHERAL     Culture Result No Growth  Note: Blood cultures are incubated for 5 days and  are monitored continuously.Positive blood cultures  are called to the RN and reported as soon as  they are identified.      Narrative:      Per Hospital Policy: Only change Specimen Src: to \"Line\" if  specified by physician order.  Right Hand    BLOOD CULTURE [[id number]] Collected: 01/08/23 2149    Order Status: Completed Specimen: Blood from Peripheral Updated: 01/09/23 0830     Significant Indicator NEG     Source BLD     Site PERIPHERAL     Culture Result No Growth  Note: Blood cultures are incubated for 5 days and  are monitored continuously.Positive blood cultures  are called to the RN and reported as soon as  they are identified.      Narrative:      Per Hospital Policy: Only change Specimen Src: to \"Line\" if  specified by physician order.  Left Hand    GRAM STAIN [[id number]] Collected: 01/08/23 2212    Order Status: Completed Specimen: Wound Updated: 01/09/23 0556     Significant Indicator .     Source WND     Site Right Knee     Gram Stain Result Rare " WBCs.  No organisms seen.      Narrative:      Right knee  Right knee    URINALYSIS [[id number]]  (Abnormal) Collected: 01/09/23 0532    Order Status: Completed Specimen: Urine, Parker Cath Updated: 01/09/23 0551     Color Yellow     Character Clear     Specific Gravity 1.026     Ph 5.5     Glucose Negative mg/dL      Ketones 15 mg/dL      Protein Negative mg/dL      Bilirubin Negative     Urobilinogen, Urine 0.2     Nitrite Positive     Leukocyte Esterase Trace     Occult Blood Negative     Micro Urine Req Microscopic    MRSA By PCR (Amp) [[id number]] Collected: 01/08/23 0317    Order Status: Completed Specimen: Respirate from Nares Updated: 01/09/23 0516     MRSA by PCR Negative    COV-2, FLU A/B, AND RSV BY PCR (2-4 HOURS CEPIlluminate LabsID): Collect NP swab in VTM [[id number]] Collected: 01/08/23 2303    Order Status: Completed Specimen: Respirate Updated: 01/09/23 0039     Influenza virus A RNA Negative     Influenza virus B, PCR Negative     RSV, PCR Negative     SARS-CoV-2 by PCR NotDetected     Comment: [facility] providers: PLEASE REFER TO DE-ESCALATION AND RETESTING PROTOCOL  on [email or web address]    **The [employer] GeneXpert Xpress SARS-CoV-2 RT-PCR Test has been made  available for use under the Emergency Use Authorization (EUA) only.          SARS-CoV-2 Source NP Swab            Labs:     Estimated Creatinine Clearance: 108.9 mL/min (A) (by C-G formula based on SCr of 0.45 mg/dL (L)).  Recent Labs     01/08/23  2016 01/09/23  0311 01/09/23  1531 01/10/23  0436 01/10/23  1551   WBC 14.9* 12.4* 24.3* 13.5* 10.7   NEUTSPOLYS  --  81.80* 95.60* 83.30*  --    BANDSSTABS  --  5.20  --   --   --      Recent Labs     01/08/23 2016 01/09/23 0311 01/09/23  1531 01/10/23  0436   BUN 18 17 14 11   CREATININE 0.40* 0.42* 0.44* 0.45*   ALBUMIN 2.7* 2.6*  --  2.2*       Intake/Output Summary (Last 24 hours) at 1/10/2023 1751  Last data filed at 1/10/2023 0600  Gross per 24 hour   Intake 3031.5 ml   Output 1055 ml   Net 1976.5 ml      BP  "99/54   Pulse 83   Temp 36.7 °C (98.1 °F)   Resp 20   Ht 1.676 m (5' 6\")   Wt 70.2 kg (154 lb 12.2 oz)   SpO2 90%  Temp (24hrs), Av.7 °C (98 °F), Min:36.3 °C (97.4 °F), Max:37.1 °C (98.8 °F)      List concerns for Vancomycin clearance:     BUN/Scr ratio greater than 20:1;Age;Malnutrition/Low albumin    Trough kinetics:   Recent Labs     01/10/23  1551   [facility] 7.9*       A/P:     Vancomycin dose: 750 mg q12h  Next vancomycin level(s):  1-2 days if therapy continues   Comments: UOP is adequate, dosing altered based on 12 hour level.  Will continue to follow and adjust based on levels and cultures.    Erasto Campuzano PharmD, BCCCP, BCPS      "

## 2023-01-12 PROBLEM — S72.421A: Status: ACTIVE | Noted: 2023-01-12

## 2023-01-12 PROBLEM — S72.431A: Status: ACTIVE | Noted: 2023-01-12

## 2023-01-12 LAB
ALBUMIN SERPL BCP-MCNC: 2.4 G/DL (ref 3.2–4.9)
ALBUMIN/GLOB SERPL: 0.9 G/DL
ALP SERPL-CCNC: 108 U/L (ref 30–99)
ALT SERPL-CCNC: 10 U/L (ref 2–50)
ANION GAP SERPL CALC-SCNC: 8 MMOL/L (ref 7–16)
AST SERPL-CCNC: 13 U/L (ref 12–45)
BASOPHILS # BLD AUTO: 0.1 % (ref 0–1.8)
BASOPHILS # BLD: 0.01 K/UL (ref 0–0.12)
BILIRUB SERPL-MCNC: 0.2 MG/DL (ref 0.1–1.5)
BUN SERPL-MCNC: 11 MG/DL (ref 8–22)
CALCIUM ALBUM COR SERPL-MCNC: 9 MG/DL (ref 8.5–10.5)
CALCIUM SERPL-MCNC: 7.7 MG/DL (ref 8.5–10.5)
CHLORIDE SERPL-SCNC: 108 MMOL/L (ref 96–112)
CO2 SERPL-SCNC: 22 MMOL/L (ref 20–33)
CREAT SERPL-MCNC: 0.46 MG/DL (ref 0.5–1.4)
EOSINOPHIL # BLD AUTO: 0 K/UL (ref 0–0.51)
EOSINOPHIL NFR BLD: 0 % (ref 0–6.9)
ERYTHROCYTE [DISTWIDTH] IN BLOOD BY AUTOMATED COUNT: 64.2 FL (ref 35.9–50)
FERRITIN SERPL-MCNC: 93.3 NG/ML (ref 10–291)
GFR SERPLBLD CREATININE-BSD FMLA CKD-EPI: 102 ML/MIN/1.73 M 2
GLOBULIN SER CALC-MCNC: 2.7 G/DL (ref 1.9–3.5)
GLUCOSE SERPL-MCNC: 116 MG/DL (ref 65–99)
HCT VFR BLD AUTO: 26.1 % (ref 37–47)
HEMOCCULT STL QL: NEGATIVE
HGB BLD-MCNC: 8.1 G/DL (ref 12–16)
IMM GRANULOCYTES # BLD AUTO: 0.06 K/UL (ref 0–0.11)
IMM GRANULOCYTES NFR BLD AUTO: 0.6 % (ref 0–0.9)
LDH SERPL L TO P-CCNC: 420 U/L (ref 107–266)
LYMPHOCYTES # BLD AUTO: 0.74 K/UL (ref 1–4.8)
LYMPHOCYTES NFR BLD: 6.9 % (ref 22–41)
MAGNESIUM SERPL-MCNC: 2.1 MG/DL (ref 1.5–2.5)
MCH RBC QN AUTO: 30.3 PG (ref 27–33)
MCHC RBC AUTO-ENTMCNC: 31 G/DL (ref 33.6–35)
MCV RBC AUTO: 97.8 FL (ref 81.4–97.8)
MONOCYTES # BLD AUTO: 0.54 K/UL (ref 0–0.85)
MONOCYTES NFR BLD AUTO: 5 % (ref 0–13.4)
NEUTROPHILS # BLD AUTO: 9.42 K/UL (ref 2–7.15)
NEUTROPHILS NFR BLD: 87.4 % (ref 44–72)
NRBC # BLD AUTO: 0 K/UL
NRBC BLD-RTO: 0 /100 WBC
PHOSPHATE SERPL-MCNC: 1.7 MG/DL (ref 2.5–4.5)
PLATELET # BLD AUTO: 396 K/UL (ref 164–446)
PMV BLD AUTO: 9.1 FL (ref 9–12.9)
POTASSIUM SERPL-SCNC: 3.8 MMOL/L (ref 3.6–5.5)
PROT SERPL-MCNC: 5.1 G/DL (ref 6–8.2)
RBC # BLD AUTO: 2.67 M/UL (ref 4.2–5.4)
SODIUM SERPL-SCNC: 138 MMOL/L (ref 135–145)
WBC # BLD AUTO: 10.8 K/UL (ref 4.8–10.8)

## 2023-01-12 PROCEDURE — A9270 NON-COVERED ITEM OR SERVICE: HCPCS | Performed by: INTERNAL MEDICINE

## 2023-01-12 PROCEDURE — 700102 HCHG RX REV CODE 250 W/ 637 OVERRIDE(OP): Performed by: STUDENT IN AN ORGANIZED HEALTH CARE EDUCATION/TRAINING PROGRAM

## 2023-01-12 PROCEDURE — 700111 HCHG RX REV CODE 636 W/ 250 OVERRIDE (IP): Performed by: INTERNAL MEDICINE

## 2023-01-12 PROCEDURE — 99223 1ST HOSP IP/OBS HIGH 75: CPT | Performed by: PHYSICAL MEDICINE & REHABILITATION

## 2023-01-12 PROCEDURE — 99233 SBSQ HOSP IP/OBS HIGH 50: CPT | Performed by: INTERNAL MEDICINE

## 2023-01-12 PROCEDURE — 99233 SBSQ HOSP IP/OBS HIGH 50: CPT | Performed by: HOSPITALIST

## 2023-01-12 PROCEDURE — 700105 HCHG RX REV CODE 258: Performed by: INTERNAL MEDICINE

## 2023-01-12 PROCEDURE — 700102 HCHG RX REV CODE 250 W/ 637 OVERRIDE(OP): Performed by: INTERNAL MEDICINE

## 2023-01-12 PROCEDURE — A9270 NON-COVERED ITEM OR SERVICE: HCPCS | Performed by: STUDENT IN AN ORGANIZED HEALTH CARE EDUCATION/TRAINING PROGRAM

## 2023-01-12 PROCEDURE — 84100 ASSAY OF PHOSPHORUS: CPT

## 2023-01-12 PROCEDURE — 82272 OCCULT BLD FECES 1-3 TESTS: CPT

## 2023-01-12 PROCEDURE — 80053 COMPREHEN METABOLIC PANEL: CPT

## 2023-01-12 PROCEDURE — 83615 LACTATE (LD) (LDH) ENZYME: CPT

## 2023-01-12 PROCEDURE — 83735 ASSAY OF MAGNESIUM: CPT

## 2023-01-12 PROCEDURE — 85025 COMPLETE CBC W/AUTO DIFF WBC: CPT

## 2023-01-12 PROCEDURE — 770001 HCHG ROOM/CARE - MED/SURG/GYN PRIV*

## 2023-01-12 PROCEDURE — 82728 ASSAY OF FERRITIN: CPT

## 2023-01-12 RX ORDER — POTASSIUM CHLORIDE 20 MEQ/1
40 TABLET, EXTENDED RELEASE ORAL ONCE
Status: COMPLETED | OUTPATIENT
Start: 2023-01-12 | End: 2023-01-12

## 2023-01-12 RX ADMIN — SODIUM HYPOCHLORITE 150 ML: 1.25 SOLUTION TOPICAL at 04:43

## 2023-01-12 RX ADMIN — OXYCODONE HYDROCHLORIDE 5 MG: 5 TABLET ORAL at 00:53

## 2023-01-12 RX ADMIN — HYDROCORTISONE SODIUM SUCCINATE 50 MG: 100 INJECTION, POWDER, FOR SOLUTION INTRAMUSCULAR; INTRAVENOUS at 17:25

## 2023-01-12 RX ADMIN — OXYCODONE HYDROCHLORIDE 5 MG: 5 TABLET ORAL at 10:42

## 2023-01-12 RX ADMIN — POTASSIUM CHLORIDE 40 MEQ: 1500 TABLET, EXTENDED RELEASE ORAL at 10:42

## 2023-01-12 RX ADMIN — ENOXAPARIN SODIUM 40 MG: 40 INJECTION SUBCUTANEOUS at 17:25

## 2023-01-12 RX ADMIN — LEVOTHYROXINE SODIUM 50 MCG: 0.05 TABLET ORAL at 05:12

## 2023-01-12 RX ADMIN — POLYETHYLENE GLYCOL 3350 1 PACKET: 17 POWDER, FOR SOLUTION ORAL at 05:13

## 2023-01-12 RX ADMIN — OXYBUTYNIN CHLORIDE 10 MG: 10 TABLET, EXTENDED RELEASE ORAL at 17:25

## 2023-01-12 RX ADMIN — DIBASIC SODIUM PHOSPHATE, MONOBASIC POTASSIUM PHOSPHATE AND MONOBASIC SODIUM PHOSPHATE 500 MG: 852; 155; 130 TABLET ORAL at 17:25

## 2023-01-12 RX ADMIN — OXYCODONE HYDROCHLORIDE 5 MG: 5 TABLET ORAL at 15:02

## 2023-01-12 RX ADMIN — OXYCODONE HYDROCHLORIDE 5 MG: 5 TABLET ORAL at 05:13

## 2023-01-12 RX ADMIN — FUROSEMIDE 20 MG: 10 INJECTION, SOLUTION INTRAMUSCULAR; INTRAVENOUS at 16:46

## 2023-01-12 RX ADMIN — DIBASIC SODIUM PHOSPHATE, MONOBASIC POTASSIUM PHOSPHATE AND MONOBASIC SODIUM PHOSPHATE 500 MG: 852; 155; 130 TABLET ORAL at 10:43

## 2023-01-12 RX ADMIN — CYANOCOBALAMIN TAB 500 MCG 500 MCG: 500 TAB at 05:14

## 2023-01-12 RX ADMIN — AMPICILLIN SODIUM AND SULBACTAM SODIUM 3 G: 2; 1 INJECTION, POWDER, FOR SOLUTION INTRAMUSCULAR; INTRAVENOUS at 05:16

## 2023-01-12 RX ADMIN — AMPICILLIN SODIUM AND SULBACTAM SODIUM 3 G: 2; 1 INJECTION, POWDER, FOR SOLUTION INTRAMUSCULAR; INTRAVENOUS at 18:48

## 2023-01-12 RX ADMIN — SODIUM HYPOCHLORITE 1 ML: 1.25 SOLUTION TOPICAL at 17:29

## 2023-01-12 RX ADMIN — AMPICILLIN SODIUM AND SULBACTAM SODIUM 3 G: 2; 1 INJECTION, POWDER, FOR SOLUTION INTRAMUSCULAR; INTRAVENOUS at 13:12

## 2023-01-12 RX ADMIN — DOCUSATE SODIUM 50 MG AND SENNOSIDES 8.6 MG 2 TABLET: 8.6; 5 TABLET, FILM COATED ORAL at 05:12

## 2023-01-12 RX ADMIN — HYDROCORTISONE SODIUM SUCCINATE 50 MG: 100 INJECTION, POWDER, FOR SOLUTION INTRAMUSCULAR; INTRAVENOUS at 00:36

## 2023-01-12 RX ADMIN — FUROSEMIDE 20 MG: 10 INJECTION, SOLUTION INTRAMUSCULAR; INTRAVENOUS at 05:13

## 2023-01-12 RX ADMIN — HYDROCORTISONE SODIUM SUCCINATE 50 MG: 100 INJECTION, POWDER, FOR SOLUTION INTRAMUSCULAR; INTRAVENOUS at 05:13

## 2023-01-12 RX ADMIN — AMPICILLIN SODIUM AND SULBACTAM SODIUM 3 G: 2; 1 INJECTION, POWDER, FOR SOLUTION INTRAMUSCULAR; INTRAVENOUS at 00:41

## 2023-01-12 RX ADMIN — OXYBUTYNIN CHLORIDE 10 MG: 10 TABLET, EXTENDED RELEASE ORAL at 05:14

## 2023-01-12 RX ADMIN — POTASSIUM CHLORIDE 40 MEQ: 1500 TABLET, EXTENDED RELEASE ORAL at 05:13

## 2023-01-12 RX ADMIN — OXYCODONE HYDROCHLORIDE 5 MG: 5 TABLET ORAL at 20:45

## 2023-01-12 ASSESSMENT — LIFESTYLE VARIABLES
DOES PATIENT WANT TO STOP DRINKING: NO
HAVE YOU EVER FELT YOU SHOULD CUT DOWN ON YOUR DRINKING: NO
HAVE PEOPLE ANNOYED YOU BY CRITICIZING YOUR DRINKING: NO
ON A TYPICAL DAY WHEN YOU DRINK ALCOHOL HOW MANY DRINKS DO YOU HAVE: 1
TOTAL SCORE: 0
TOTAL SCORE: 0
EVER FELT BAD OR GUILTY ABOUT YOUR DRINKING: NO
TOTAL SCORE: 0
EVER HAD A DRINK FIRST THING IN THE MORNING TO STEADY YOUR NERVES TO GET RID OF A HANGOVER: NO
ALCOHOL_USE: YES
CONSUMPTION TOTAL: NEGATIVE
AVERAGE NUMBER OF DAYS PER WEEK YOU HAVE A DRINK CONTAINING ALCOHOL: 4
HOW MANY TIMES IN THE PAST YEAR HAVE YOU HAD 5 OR MORE DRINKS IN A DAY: 0

## 2023-01-12 ASSESSMENT — ENCOUNTER SYMPTOMS
CHILLS: 0
LOSS OF CONSCIOUSNESS: 0
PALPITATIONS: 0
DOUBLE VISION: 0
HEADACHES: 0
DIZZINESS: 0
ABDOMINAL PAIN: 0
COUGH: 0
SHORTNESS OF BREATH: 0
BLURRED VISION: 0
VOMITING: 0
DIARRHEA: 0
BACK PAIN: 0
NAUSEA: 0
FOCAL WEAKNESS: 1
FEVER: 0
SORE THROAT: 0

## 2023-01-12 ASSESSMENT — PAIN DESCRIPTION - PAIN TYPE
TYPE: ACUTE PAIN
TYPE: ACUTE PAIN;SURGICAL PAIN
TYPE: ACUTE PAIN
TYPE: ACUTE PAIN;SURGICAL PAIN

## 2023-01-12 ASSESSMENT — PATIENT HEALTH QUESTIONNAIRE - PHQ9
SUM OF ALL RESPONSES TO PHQ9 QUESTIONS 1 AND 2: 0
2. FEELING DOWN, DEPRESSED, IRRITABLE, OR HOPELESS: NOT AT ALL
1. LITTLE INTEREST OR PLEASURE IN DOING THINGS: NOT AT ALL

## 2023-01-12 ASSESSMENT — FIBROSIS 4 INDEX: FIB4 SCORE: 0.73

## 2023-01-12 NOTE — ASSESSMENT & PLAN NOTE
Fe, %sat and TIBC low: unlikely Fe deficiency  ?chronic blood loss from wound or GI tract  Folic acid WNL  B12 high  TSH 6.3 FT4 WNL  Hb levels remains stable on 1/16 I personally reviewed CBC on 1/16  LDH is normal  Haptoglobin level is barely elevated at 201, I personally reviewed this lab on 1/16

## 2023-01-12 NOTE — THERAPY
"Occupational Therapy   Initial Evaluation     Patient Name: Dyana Conway  Age:  72 y.o., Sex:  female  Medical Record #: [id number]  Today's Date: 1/11/2023    Precautions: Fall Risk, Weight Bearing As Tolerated Right Lower Extremity  Comments: pt had increased bleeding from wound incision today    Assessment  Patient is 72 y.o. female admitted 1/8/2023 after falling in the kitchen on hardwood floor approximately 2 weeks prior to admission.  Pt is s/p intramedullary femoral fixation for open type I distal femur fracture & wound debridement.  Pt has PMHx of paraplegic/wheelchair-bound secondary to transverse myelitis over 38 yrs ago.  Pt has a coccygeal decubitus ulcer.   Pt reported that PTA her  has been assisting her with transfers to her W/C.  Pt was able to dress herself & perform basic ADL's from a W/C level.  Today pt required Min A for supine to sit EOB.  Pt had difficulty scooting due to wound on her coccyx.  Pt also limited by right knee pain in sitting.  Pt's wound began to leak blood while seated EOB with knee in flex.  Pt declined to stand & attempt transfer to bedside chair due to fear & pain.  Pt will likely need POst Acute OT services prior to D/C home.    Plan    Occupational Therapy Initial Treatment Plan   Treatment Interventions: Self Care / Activities of Daily Living, Adaptive Equipment, Neuro Re-Education / Balance, Therapeutic Exercises, Therapeutic Activity  Treatment Frequency: 3 Times per Week  Duration: Until Therapy Goals Met    DC Equipment Recommendations: Unable to determine at this time  Discharge Recommendations: Recommend post-acute placement for additional occupational therapy services prior to discharge home     Subjective    \"My knee is hurting too much to try standing today\"     Objective       01/11/23 1514   Prior Living Situation   Prior Services Continuous (24 Hour) Care Giving Family   Housing / Facility Mobile Home   Steps Into Home   (ramp)   Bathroom Set up "   (pt reports she can't get into the bathroom)   Equipment Owned Wheelchair   Lives with - Patient's Self Care Capacity Spouse   Comments Pt reports she hasn't been able to walk for years.  Pt's  has been helping with transfers for the past 3 yrs   Prior Level of ADL Function   Self Feeding Independent   Grooming / Hygiene Independent   Bathing Requires Assist   Dressing Independent   Toileting Requires Assist   Prior Level of IADL Function   Medication Management Independent   Laundry Requires Assist   Kitchen Mobility Independent   Finances Requires Assist   Home Management Requires Assist   Shopping Requires Assist   Prior Level Of Mobility Uses Wheel Chair for in Home Mobility   Cognition    Cognition / Consciousness X   Level of Consciousness Alert   New Learning Impaired   Comments pleasant, limited insight into her current deficits   Balance Assessment   Sitting Balance (Static) Fair   Sitting Balance (Dynamic) Fair -   Weight Shift Sitting Fair   Comments Pt's RLE began to have increased pain & bleeding around dressing once seated EOB.  Pt did not want to attempt to stand & transfer to bedside chair   Bed Mobility    Supine to Sit Minimal Assist   Sit to Supine Moderate Assist   Scooting Contact Guard Assist   Rolling Minimal Assist to Rt.;Minimum Assist to Lt.   Comments Pt also limited by sacral wound that was present PTA   ADL Assessment   Eating Modified Independent   Grooming Supervision;Seated   Bathing Minimal Assist   Upper Body Dressing Supervision   Lower Body Dressing Moderate Assist   Toileting Moderate Assist   Functional Mobility   Sit to Stand Unable to Participate   Bed, Chair, Wheelchair Transfer Unable to Participate   Comments pt limited by pain & concerned with increased bleeding around surgical site, Nsg aware   Patient / Family Goals   Patient / Family Goal #1 To be able to get into my W/C   Short Term Goals   Short Term Goal # 1 Pt will be Min A for BSC transfers   Short Term  Goal # 2 Pt will dress LB with supervision

## 2023-01-12 NOTE — PROGRESS NOTES
"Hospital Medicine Daily Progress Note    Date of Service  1/12/2023    Chief Complaint  Dyana Conway is a 72 y.o. female admitted 1/8/2023 with knee pain    Hospital Course    71yo admitted 1/8.  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  Presented after a fall from her W/C with c/o knee pain.  Found to have a comminuted Fx of distal femur underlying a septic joint.  Underwent ORIF and I&D on 1/9 and then transferred to ICU due to post op hypotension.  Started on IV pressors.  Given one unit PRBCs and required stress dose steroids (cortisol 2)    Interval Problem Update  Pt states she has \"a little\" pain in her knee, not bad at all while at rest but still painful with movement.  Is otherwise feeling OK    AFebrile  Sinus 70s   on hydrocortisone  6 LNC  UOP 2.9 L/24hrs on no diuretics    I have discussed this patient's plan of care and discharge plan at IDT rounds today with Case Management, Nursing, Nursing leadership, and other members of the IDT team.    Consultants/Specialty  orthopedics    Code Status  Full Code    Disposition  Patient is not medically cleared for discharge.   Anticipate discharge to to an inpatient rehabilitation hospital.  I have placed the appropriate orders for post-discharge needs.    Review of Systems  Review of Systems   Constitutional:  Negative for chills and fever.   HENT:  Negative for nosebleeds and sore throat.    Eyes:  Negative for blurred vision and double vision.   Respiratory:  Negative for cough and shortness of breath.    Cardiovascular:  Negative for chest pain, palpitations and leg swelling.   Gastrointestinal:  Negative for abdominal pain, diarrhea, nausea and vomiting.   Genitourinary:  Negative for dysuria and urgency.   Musculoskeletal:  Negative for back pain.        R knee pain   Skin:  Negative for rash.   Neurological:  Positive for focal weakness. Negative for " dizziness, loss of consciousness and headaches.      Physical Exam  Temp:  [36.6 °C (97.8 °F)-37.3 °C (99.1 °F)] 37.3 °C (99.1 °F)  Pulse:  [] 75  Resp:  [13-61] 28  BP: ()/(50-71) 102/71  SpO2:  [89 %-98 %] 95 %    Physical Exam  Constitutional:       General: She is not in acute distress.     Appearance: Normal appearance. She is well-developed. She is not diaphoretic.   HENT:      Head: Normocephalic and atraumatic.   Neck:      Vascular: No JVD.   Cardiovascular:      Rate and Rhythm: Normal rate and regular rhythm.      Heart sounds: Murmur heard.   Pulmonary:      Effort: Pulmonary effort is normal. No respiratory distress.      Breath sounds: No stridor. No wheezing or rales.   Abdominal:      Palpations: Abdomen is soft.      Tenderness: There is no abdominal tenderness. There is no guarding or rebound.   Musculoskeletal:         General: No tenderness.      Right lower leg: Edema present.      Left lower leg: Edema present.      Comments: R>L lower leg edema  Feet warm and pink, normal cap refill  R knee with post op dressings in place   Skin:     General: Skin is warm and dry.      Findings: No rash.   Neurological:      Mental Status: She is alert and oriented to person, place, and time. Mental status is at baseline.   Psychiatric:         Mood and Affect: Mood normal.         Behavior: Behavior normal.         Thought Content: Thought content normal.       Fluids    Intake/Output Summary (Last 24 hours) at 1/12/2023 0823  Last data filed at 1/12/2023 0800  Gross per 24 hour   Intake 909.88 ml   Output 3200 ml   Net -2290.12 ml       Laboratory  Recent Labs     01/10/23  1551 01/11/23  0525 01/12/23  0430   WBC 10.7 9.5 10.8   RBC 2.50* 2.56* 2.67*   HEMOGLOBIN 7.8* 7.9* 8.1*   HEMATOCRIT 25.0* 25.3* 26.1*   .0* 98.8* 97.8   MCH 31.2 30.9 30.3   MCHC 31.2* 31.2* 31.0*   RDW 64.4* 65.5* 64.2*   PLATELETCT 398 387 396   MPV 8.8* 8.9* 9.1     Recent Labs     01/10/23  0436 01/11/23  0525  01/12/23  0430   SODIUM 137 137 138   POTASSIUM 4.3 3.8 3.8   CHLORIDE 108 110 108   CO2 22 21 22   GLUCOSE 115* 119* 116*   BUN 11 11 11   CREATININE 0.45* 0.41* 0.46*   CALCIUM 7.5* 7.4* 7.7*                   Imaging  DX-CHEST-PORTABLE (1 VIEW)   Final Result      Small BILATERAL pleural effusions with overlying atelectasis or airspace disease      DX-FEMUR-2+ RIGHT   Final Result      Postoperative change from resection of the distal RIGHT femur with internal fixation.      DX-PORTABLE FLUORO > 1 HOUR   Final Result      Portable fluoroscopy utilized for 1 minute 42 seconds.         INTERPRETING LOCATION: [address] [place] [address], [place], [id number]      DX-FEMUR-2+ RIGHT   Final Result      Portable intraoperative imaging with findings as described above.      CT-FOREIGN FILM CAT SCAN   Final Result      DX-CHEST-PORTABLE (1 VIEW)   Final Result         1. No acute cardiopulmonary abnormalities are identified.      CT-KNEE W/O PLUS RECONS RIGHT   Final Result         1. There is an open wound in the anterior aspect with small amount of gas.      2. Subacute significant displays comminuted fracture of the distal femur with multiple displaced fragments.      3. Significant fluid and heterotopic calcification seen in the lateral aspect of the distal femoral fracture with rarefaction of the bones. This could reflects subacute nature of the fracture with superimposed infection/osteomyelitis given the open    wound. Underlying lytic mass cannot be excluded. MRI with contrast may be helpful for further evaluation.      CT-CSPINE WITHOUT PLUS RECONS   Final Result         1. No acute fracture from C1 through T1 is visualized.         CT-HEAD W/O   Final Result         1. No acute intracranial abnormality. No evidence of acute intracranial hemorrhage or mass lesion.                          Assessment/Plan  * Septic shock (HCC)- (present on admission)  Assessment & Plan  This is Sepsis Present on admission  Source septic  arthritis  Blood cultures neg  Cultures from R knee EColi and Enterococcus both sensitive to ampicillin  Cont rapid Hydrocortisone taper        Closed bicondylar fracture of right femur (HCC)  Assessment & Plan  S/p ORIF  Ortho following  Pt had been I with transfers prior to her fall  Rehab consulted    Anemia  Assessment & Plan  Hb 7.8 on admission  Check iron studies, folate, B12 and TSH  Monitor CBC, transfuse for hemoglobin less than 7      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)  Assessment & Plan  oxybutinin    Mild protein-calorie malnutrition (HCC)- (present on admission)  Assessment & Plan  Nutrition consult    Microcytic anemia- (present on admission)  Assessment & Plan  Fe, %sat and TIBC low: unlikely Fe deficiency  ?chronic blood loss from wound or GI tract  Folic acid WNL  B12 high  TSH 6.3 FT4 WNL  Check guaiac, LDH  Cont to follow    Septic joint of left knee joint (HCC)- (present on admission)  Assessment & Plan  Wound cultures showing Amp sensitive EColi and Enterococcus  Reviewed with ID who recommend amp/sulbactam here in house with transition to Amox/Clav for a total of 6wks from I&D: stop date 2/20           VTE prophylaxis: enoxaparin ppx    I have performed a physical exam and reviewed and updated ROS and Plan today (1/12/2023). In review of yesterday's note (1/11/2023), there are no changes except as documented above.

## 2023-01-12 NOTE — DISCHARGE PLANNING
[facility] Transitional Care Coordination    Referral from:  Dr Hollis  Insurance Provider on Facesheet: Medicare  Potential Rehab Diagnosis: Ortho    Chart review indicates patient may have on going medical management and may have therapy needs to possibly meet inpatient rehab facility criteria with the goal of returning to community.    D/C support: Spouse - will need to verify.     Physiatry consultation forwarded per protocol.     Femur fracture septic knee, PMHx of paraplegic/wheelchair-bound secondary to transverse myelitis - physiatry to consult, TCC will follow.     Thank you for the referral.

## 2023-01-12 NOTE — ASSESSMENT & PLAN NOTE
Wound debridement with multiple soft tissue and bone cultures, Retrograde intramedullary femoral fixation with cement supplementation.   1/9/2023  Pain control, wound care  WBAT RLE  ID recommendation - IV Unasyn until 2/20/2023, then Augmentin for antibiotic suppression  PICC line placement 1/20/2023

## 2023-01-12 NOTE — PROGRESS NOTES
Critical Care Progress Note    Date of admission  1/8/2023    Chief Complaint  72 y.o. female admitted 1/8/2023 with hypotension after intramedullary femoral fixation and wound debridement of a 2-week old open type I distal femur fracture after ground-level fall approximately 2 weeks prior to admission.    Hospital Course  72 y.o. female, paraplegic/wheelchair-bound secondary to transverse myelitis, coccygeal decubitus ulcer who presented 1/8/2023 swollen knee after falling in the kitchen on hardwood floor approximately 2 weeks prior to admission.  She has paraplegia and diminished sensation.  On presentation she was noted to have open wound with purulent discharge.  There was concern for possible osteomyelitis.  Today she underwent I&D of the wound and intramedullary nail fixation of the right femur.  Wound and bone cultures were obtained and sent.  Postoperatively she was hypotensive in PACU.  She received 2.5 L of crystalloid intraoperatively and additional liter postoperatively.  She has been started on IV Jack-Synephrine and will be transferred to ICU.  She is currently receiving ceftriaxone and vancomycin.  We will escalate to Zosyn-vancomycin and de-escalate with culture availability.  BC x2 from 1/8 as well as initial wound culture have been no growth today.    1/10 - Hgb 6.0, transfuse 1 unit PRBCs, follow-up iron studies, cortisol 2.2, add stress steroids, wean off Jack-Synephrine  1/11 -improved, titrating off Jack-Synephrine, continue stress steroids, de-escalate antibiotics to Unasyn per ID recommendation  1/12 -ongoing Lasix, taper stress steroids very slowly, transfer out of ICU, continue Unasyn while inpatient, de-escalate to Augmentin, total 6 weeks ABX per succussion with with ID    Interval Problem Update  Reviewed last 24 hour events:  T-max 98.2  WBC 10.8  No pressors, VSS  Stress corticosteroids  Started on Lasix yesterday  I/O = 800/2.9  6 lpm oxygen  A/o x 5  Oxy for pain  SR  IS  1000  Keith  PT/OT        Review of Systems  Review of Systems   Unable to perform ROS: Acuity of condition      Vital Signs for last 24 hours   Temp:  [36.6 °C (97.8 °F)-36.8 °C (98.2 °F)] 36.7 °C (98.1 °F)  Pulse:  [] 69  Resp:  [13-61] 15  BP: ()/(50-70) 108/68  SpO2:  [89 %-98 %] 94 %    Hemodynamic parameters for last 24 hours       Respiratory Information for the last 24 hours       Physical Exam   Physical Exam  Vitals and nursing note reviewed.   Constitutional:       Comments: Somnolent but arouses, follows commands, oriented   HENT:      Head: Normocephalic.      Nose: Nose normal.      Mouth/Throat:      Mouth: Mucous membranes are moist.   Eyes:      Pupils: Pupils are equal, round, and reactive to light.   Cardiovascular:      Rate and Rhythm: Normal rate and regular rhythm.      Pulses: Normal pulses.   Pulmonary:      Effort: Pulmonary effort is normal. No respiratory distress.      Breath sounds: No wheezing.   Abdominal:      General: There is no distension.      Palpations: Abdomen is soft.   Musculoskeletal:         General: No swelling.      Cervical back: Neck supple.   Skin:     Comments: Right buttock wound, see photos, reviewed   Neurological:      Comments: Awake, follows commands, weak lower extremity movement       Medications  Current Facility-Administered Medications   Medication Dose Route Frequency Provider Last Rate Last Admin    phosphorus (K-Phos-Neutral) per tablet 500 mg  2 Tablet Oral BID Patric Bansal M.D.        potassium chloride SA (Kdur) tablet 40 mEq  40 mEq Oral Once Patric Bansal M.D.        furosemide (LASIX) injection 20 mg  20 mg Intravenous BID DIURETIC Patric Bansal M.D.   20 mg at 01/12/23 0513    ampicillin/sulbactam (UNASYN) 3 g in  mL IVPB  3 g Intravenous Q6HRS Patric Bansal M.D.   Stopped at 01/12/23 0546    MD Alert...ICU Electrolyte Replacement per Pharmacy   Other PHARMACY TO DOSE Patric Bansal M.D.        enoxaparin (Lovenox) inj  40 mg  40 mg Subcutaneous DAILY AT 1800 Patric Bansal M.D.   40 mg at 01/11/23 1816    hydrocortisone sodium succinate PF (Solu-CORTEF) 100 MG injection 50 mg  50 mg Intravenous Q6HRS Patric Bansal M.D.   50 mg at 01/12/23 0513    oxyCODONE immediate-release (ROXICODONE) tablet 5 mg  5 mg Oral Q4HRS PRN Matthew Ye M.D.   5 mg at 01/12/23 0513    NS infusion   Intravenous Continuous Florian Pablo M.D. 83 mL/hr at 01/11/23 0207 New Bag at 01/11/23 0207    phenylephrine 40 mg/250 mL NS premix  0-5 mcg/kg/min (Ideal) Intravenous Continuous Patric Bansal M.D. 5.6 mL/hr at 01/11/23 0049 0.25 mcg/kg/min at 01/11/23 0049    vasopressin (Vasostrict) 20 Units in  mL Infusion  0.03 Units/min Intravenous Continuous Patric Bansal M.D.   Dose not Required at 01/09/23 1730    dakins 0.125% (1/4 strength) topical soln   Topical BID Patric Bansal M.D.   150 mL at 01/12/23 0443    senna-docusate (PERICOLACE or SENOKOT S) 8.6-50 MG per tablet 2 Tablet  2 Tablet Oral BID Gabi Byrne M.D.   2 Tablet at 01/12/23 0512    And    polyethylene glycol/lytes (MIRALAX) PACKET 1 Packet  1 Packet Oral QDAY PRN Gabi Byrne M.D.   1 Packet at 01/12/23 0513    And    magnesium hydroxide (MILK OF MAGNESIA) suspension 30 mL  30 mL Oral QDAY PRN Gabi Byrne M.D.        And    bisacodyl (DULCOLAX) suppository 10 mg  10 mg Rectal QDAY PRN Gabi Byrne M.D.        lactated ringers infusion (BOLUS): BMI less than or equal to 30  30 mL/kg Intravenous Once PRN Gabi Byrne M.D.        acetaminophen (Tylenol) tablet 650 mg  650 mg Oral Q6HRS PRN CHEMO TrinhD.        cyanocobalamin (VITAMIN B-12) tablet 500 mcg  500 mcg Oral DAILY Gabi Byrne M.D.   500 mcg at 01/12/23 0514    levothyroxine (SYNTHROID) tablet 50 mcg  50 mcg Oral AM ES Gabi Byrne M.D.   50 mcg at 01/12/23 0512    oxybutynin SR (DITROPAN-XL) tablet 10 mg  10 mg Oral BID Gabi Byrne M.D.   10 mg at 01/12/23 0514        Fluids    Intake/Output Summary (Last 24 hours) at 1/12/2023 0755  Last data filed at 1/12/2023 0600  Gross per 24 hour   Intake 800 ml   Output 2900 ml   Net -2100 ml         Laboratory          Recent Labs     01/09/23  1720 01/10/23  0436 01/11/23  0525 01/12/23 0430   SODIUM  --  137 137 138   POTASSIUM  --  4.3 3.8 3.8   CHLORIDE  --  108 110 108   CO2  --  22 21 22   BUN  --  11 11 11   CREATININE  --  0.45* 0.41* 0.46*   MAGNESIUM 1.7 1.8 2.1 2.1   PHOSPHORUS 2.4*  --  2.0* 1.7*   CALCIUM  --  7.5* 7.4* 7.7*       Recent Labs     01/10/23  0436 01/11/23  0525 01/12/23 0430   ALTSGPT 9 11 10   ASTSGOT 13 14 13   ALKPHOSPHAT 101* 116* 108*   TBILIRUBIN 0.3 0.3 0.2   GLUCOSE 115* 119* 116*       Recent Labs     01/10/23  0436 01/10/23  1551 01/11/23 0525 01/12/23 0430   WBC 13.5* 10.7 9.5 10.8   NEUTSPOLYS 83.30*  --  89.30* 87.40*   LYMPHOCYTES 7.80*  --  5.70* 6.90*   MONOCYTES 8.10  --  4.30 5.00   EOSINOPHILS 0.00  --  0.00 0.00   BASOPHILS 0.10  --  0.20 0.10   ASTSGOT 13  --  14 13   ALTSGPT 9  --  11 10   ALKPHOSPHAT 101*  --  116* 108*   TBILIRUBIN 0.3  --  0.3 0.2       Recent Labs     01/09/23  0949 01/09/23  1531 01/10/23  0436 01/10/23  1551 01/11/23 0525 01/12/23 0430   RBC  --    < > 1.96* 2.50* 2.56* 2.67*   HEMOGLOBIN  --    < > 6.0* 7.8* 7.9* 8.1*   HEMATOCRIT  --    < > 20.2* 25.0* 25.3* 26.1*   PLATELETCT  --    < > 521* 398 387 396   IRON 20*  --  14*  --   --   --    TOTIRONBC 218*  --  195*  --   --   --     < > = values in this interval not displayed.         Imaging  X-Ray:  I have personally reviewed the images and compared with prior images.    Assessment/Plan  Sepsis  -Secondary to open distal right femur fracture   -Wound debridement with intramedullary femoral fixation and multiple culture sent 1/9  -Received crystalloid volume resuscitation, lactate 1.7, sepsis order set per hospitalist  -Initial blood cultures, wound cultures no growth today, follow-up repeat  tissue/wound cultures  -Started on Jack-Synephrine in PACU for hypotension postoperatively  -Continue titrated vasopressors, consider stress dose hydrocortisone  -Continue broad-spectrum antibiotics with Zosyn/vancomycin, discuss with ID  -Wound care  -Cortisol quite low at 2.2, starting stress dose hydrocortisone today    1/11-clinically improving, continue stress steroid, de-escalate ABX, 6 weeks total antibiotics, out of ICU 7, PT/OT/wound care     Decubitus ulcer  -Right buttock/ischium, clean appearing   -Wound care     Paraplegia, secondary to transverse myelitis  -wheelchair-bound at baseline     Anemia  -Without evidence of acute blood loss  -Follow-up iron studies/anemia work-up     Goals of care/advance care planning  -Discussed with patient further, family, palliative care  -Currently full CODE STATUS    Update: Plan as above: ongoing Lasix, taper stress steroids very slowly, transfer out of ICU, continue Unasyn while inpatient, de-escalate to Augmentin, total 6 weeks ABX per succussion with with ID    Critical care will sign off.  Case discussed with hospitalist service.

## 2023-01-12 NOTE — PROGRESS NOTES
"   Orthopaedic Progress Note    Interval changes:  Patient doing well     Dressings CDI  Cleared for DC by ortho pending trauma clearance    ROS - Patient denies any new issues.  Pain well controlled.    /75   Pulse 83   Temp 37.2 °C (99 °F) (Temporal)   Resp (!) 23   Ht 1.676 m (5' 6\")   Wt 70.2 kg (154 lb 12.2 oz)   SpO2 95%       Patient seen and examined  No acute distress  Breathing non labored  RRR  RLE dressings CDI, no change from prior neuro status, no issues.     Recent Labs     01/10/23  1551 01/11/23  0525 01/12/23  0430   WBC 10.7 9.5 10.8   RBC 2.50* 2.56* 2.67*   HEMOGLOBIN 7.8* 7.9* 8.1*   HEMATOCRIT 25.0* 25.3* 26.1*   .0* 98.8* 97.8   MCH 31.2 30.9 30.3   MCHC 31.2* 31.2* 31.0*   RDW 64.4* 65.5* 64.2*   PLATELETCT 398 387 396   MPV 8.8* 8.9* 9.1       Active Hospital Problems    Diagnosis     Closed bicondylar fracture of right femur (HCC) [S72.421A, S72.431A]     Septic shock (HCC) [A41.9, R65.21]     Septic joint of left knee joint (HCC) [M00.9]     Microcytic anemia [D50.9]     Mild protein-calorie malnutrition (HCC) [E44.1]     Neuropathic bladder [N31.9]     Hypocalcemia [E83.51]        Assessment/Plan:  Patient doing well     Dressings CDI  Cleared for DC by ortho pending trauma clearance  POD#3 S/P   1.  Wound debridement with multiple soft tissue and bone cultures.  2.  Retrograde intramedullary femoral fixation with cement supplementation  Wt bearing status - WBAT  Wound care/Drains - Dressings to be changed every other day by nursing  Future Procedures - none planned   Lovenox: Start 1/10, Duration-until ambulatory > 150'  Sutures/Staples out- 14 days post operatively  PT/OT-initiated  Antibiotics: unasyn 3g IV q8   DVT Prophylaxis- TEDS/SCDs/Foot pumps  Parker-none  Case Coordination for Discharge Planning - Disposition home   "

## 2023-01-12 NOTE — CARE PLAN
The patient is Stable - Low risk of patient condition declining or worsening    Shift Goals  Clinical Goals: pain control, wound care  Patient Goals: pain control  Family Goals: ABDIRASHID    Progress made toward(s) clinical / shift goals:    Problem: Knowledge Deficit - Standard  Goal: Patient and family/care givers will demonstrate understanding of plan of care, disease process/condition, diagnostic tests and medications  Outcome: Progressing     Problem: Pain - Standard  Goal: Alleviation of pain or a reduction in pain to the patient’s comfort goal  Outcome: Progressing       Patient is not progressing towards the following goals:

## 2023-01-12 NOTE — CARE PLAN
The patient is Stable - Low risk of patient condition declining or worsening    Shift Goals  Clinical Goals: pain control, wound care  Patient Goals: pain control  Family Goals: ABDIRASHID    Progress made toward(s) clinical / shift goals:  Parker removed, mobility on going, Pain well controlled with PRN medications     Patient is not progressing towards the following goals:      Problem: Knowledge Deficit - Standard  Goal: Patient and family/care givers will demonstrate understanding of plan of care, disease process/condition, diagnostic tests and medications  Outcome: Progressing     Problem: Skin Integrity  Goal: Skin integrity is maintained or improved  Outcome: Progressing     Problem: Pain - Standard  Goal: Alleviation of pain or a reduction in pain to the patient’s comfort goal  Outcome: Progressing

## 2023-01-12 NOTE — PROGRESS NOTES
"   Orthopaedic Progress Note    Interval changes:  Patient doing well     Dressings changed no issues with incisions  Cleared for DC by ortho pending trauma clearance    ROS - Patient denies any new issues.  Pain well controlled.    /70   Pulse 80   Temp 36.8 °C (98.2 °F) (Temporal)   Resp 20   Ht 1.676 m (5' 6\")   Wt 70.2 kg (154 lb 12.2 oz)   SpO2 94%       Patient seen and examined  No acute distress  Breathing non labored  RRR  RLE dressings CDI, no issues.     Recent Labs     01/10/23  0436 01/10/23  1551 01/11/23  0525   WBC 13.5* 10.7 9.5   RBC 1.96* 2.50* 2.56*   HEMOGLOBIN 6.0* 7.8* 7.9*   HEMATOCRIT 20.2* 25.0* 25.3*   .1* 100.0* 98.8*   MCH 30.6 31.2 30.9   MCHC 29.7* 31.2* 31.2*   RDW 67.0* 64.4* 65.5*   PLATELETCT 521* 398 387   MPV 8.8* 8.8* 8.9*       Active Hospital Problems    Diagnosis     Anemia [D64.9]     Septic shock (HCC) [A41.9, R65.21]     Septic joint of left knee joint (HCC) [M00.9]     Microcytic anemia [D50.9]     Mild protein-calorie malnutrition (HCC) [E44.1]     Neuropathic bladder [N31.9]     Hypocalcemia [E83.51]        Assessment/Plan:  Patient doing well     Dressings changed no issues with incisions  Cleared for DC by ortho pending trauma clearance  POD#2 S/P   1.  Wound debridement with multiple soft tissue and bone cultures.  2.  Retrograde intramedullary femoral fixation with cement supplementation  Wt bearing status - WBAT  Wound care/Drains - Dressings to be changed every other day by nursing  Future Procedures - none planned   Lovenox: Start 1/10, Duration-until ambulatory > 150'  Sutures/Staples out- 14 days post operatively  PT/OT-initiated  Antibiotics: unasyn 3g IV q8   DVT Prophylaxis- TEDS/SCDs/Foot pumps  Parker-none  Case Coordination for Discharge Planning - Disposition home   "

## 2023-01-12 NOTE — CARE PLAN
The patient is Stable - Low risk of patient condition declining or worsening    Shift Goals  Clinical Goals: Hemodynamic stability  Patient Goals: Rest  Family Goals: ABDIRASHID    Progress made toward(s) clinical / shift goals:    Problem: Knowledge Deficit - Standard  Goal: Patient and family/care givers will demonstrate understanding of plan of care, disease process/condition, diagnostic tests and medications  Outcome: Progressing     Problem: Skin Integrity  Goal: Skin integrity is maintained or improved  Outcome: Progressing     Problem: Pain - Standard  Goal: Alleviation of pain or a reduction in pain to the patient’s comfort goal  Outcome: Progressing

## 2023-01-12 NOTE — CONSULTS
"    Physical Medicine and Rehabilitation Consultation              Date of initial consultation: 1/12/2023  Consulting provider: Piter Sheriff D.O.  Reason for consultation: assess for acute inpatient rehab appropriateness  LOS: 4 Day(s)    Chief complaint: Right knee pain    HPI: The patient is a 72 y.o. right hand dominant female with a past medical history of transverse myelitis resulting in paraplegia, stage IV decubitus ulcer, knee swelling;  who presented on 1/8/2023  8:04 PM with an open right femur fracture after ground-level fall on Christmas 2022.  Patient was seen by Handy Chapa MD of orthopedics and taken to the OR on 1/9/2023 for wound debridement and retrograde intramedullary nail femoral fixation.    The patient currently reports right knee pain.  Patient has very little strength in her lower extremities, but has retained sensation.  Patient reports her right leg is her pivot leg and she will be unable to care for herself until this injury heals.  Patient also reports that her  is in poor health and has been suffering frequent falls.  One of patient's greatest limiter's is her very large sacral ulcer.  We discussed potential management options for this.  Patient reports that she \"would rather die than have surgery\" referring to a flap treatment plan.     ROS  Pertinent positives are mentioned in the HPI, all others reviewed and are negative.    Social Hx:  1 SH  Ramp entry  With: Spouse.  Patient uses a wheelchair at baseline, also reports she owns walkers and canes    THERAPY:  Restrictions: Fall risk, WBAT RLE  PT: Functional mobility   1/11: Unable to participate in sit to stand, transfers not assessed    OT: ADLs  1/11: Mod assist toileting and lower body dressing    SLP:   None    IMAGING:  XR femur 1/9/2023  Postoperative change from resection of the distal RIGHT femur with internal fixation.    PROCEDURES:  Handy Chapa MD 1/9/2023  1.  Wound debridement with multiple soft " tissue and bone cultures.  2.  Retrograde intramedullary femoral fixation with cement supplementation.    PMH:  Past Medical History:   Diagnosis Date    Allergy     Microcytic anemia 1/8/2023    Muscle disorder     Septic joint of left knee joint (HCC) 1/8/2023    Urinary tract infection, site not specified        PSH:  Past Surgical History:   Procedure Laterality Date    IRRIGATION & DEBRIDEMENT GENERAL Right 1/9/2023    Procedure: IRRIGATION AND DEBRIDEMENT, WOUND;  Surgeon: Handy Chapa M.D.;  Location: SURGERY [facility];  Service: Orthopedics    ORIF, FRACTURE, FEMUR Right 1/9/2023    Procedure: INTRAMEDULLARY NAIL FIXATION OF RIGHT FEMUR  WITH CEMENT SUPPLEMENTATION;  Surgeon: Handy Chapa M.D.;  Location: SURGERY [facility];  Service: Orthopedics    ABDOMINAL EXPLORATION         FHX:  Family History   Family history unknown: Yes       Medications:  Current Facility-Administered Medications   Medication Dose    phosphorus (K-Phos-Neutral) per tablet 500 mg  2 Tablet    potassium chloride SA (Kdur) tablet 40 mEq  40 mEq    hydrocortisone sodium succinate PF (Solu-CORTEF) 100 MG injection 50 mg  50 mg    furosemide (LASIX) injection 20 mg  20 mg    ampicillin/sulbactam (UNASYN) 3 g in  mL IVPB  3 g    MD Alert...ICU Electrolyte Replacement per Pharmacy      enoxaparin (Lovenox) inj 40 mg  40 mg    oxyCODONE immediate-release (ROXICODONE) tablet 5 mg  5 mg    dakins 0.125% (1/4 strength) topical soln      senna-docusate (PERICOLACE or SENOKOT S) 8.6-50 MG per tablet 2 Tablet  2 Tablet    And    polyethylene glycol/lytes (MIRALAX) PACKET 1 Packet  1 Packet    And    magnesium hydroxide (MILK OF MAGNESIA) suspension 30 mL  30 mL    And    bisacodyl (DULCOLAX) suppository 10 mg  10 mg    lactated ringers infusion (BOLUS): BMI less than or equal to 30  30 mL/kg    acetaminophen (Tylenol) tablet 650 mg  650 mg    cyanocobalamin (VITAMIN B-12) tablet 500 mcg  500 mcg    levothyroxine (SYNTHROID) tablet  "50 mcg  50 mcg    oxybutynin SR (DITROPAN-XL) tablet 10 mg  10 mg       Allergies:  Allergies   Allergen Reactions    Doxycycline Rash     median    Macrodantin [Nitrofurantoin] Unspecified     Unknown    Sulfa Drugs Hives         Physical Exam:  Vitals: /71   Pulse 75   Temp 37.3 °C (99.1 °F) (Temporal)   Resp (!) 28   Ht 1.676 m (5' 6\")   Wt 70.2 kg (154 lb 12.2 oz)   SpO2 95%   Gen: NAD  Head: NC/AT  Eyes/ Nose/ Mouth: PERRLA, moist mucous membranes  Cardio: RRR, good distal perfusion, warm extremities  Pulm: normal respiratory effort, no cyanosis   Abd: Soft NTND, negative borborygmi   Ext: Right knee wrapped in Ace bandage    Mental status: answers questions appropriately follows commands  Speech: fluent, no aphasia or dysarthria    Motor:      Upper Extremity  Myotome R L   Shoulder flexion C5 5 5   Elbow flexion C5 5 5   Wrist extension C6 5 5   Elbow extension C7 5 5   Finger flexion C8 5 5   Finger abduction T1 5 5     Lower Extremity Myotome R L   Hip flexion L2 0/5 0/5   Knee extension L3 0/5 0/5   Ankle dorsiflexion L4 1/5 1/5   Toe extension L5 1/5 1/5   Ankle plantarflexion S1 1/5 1/5     Sensory:   intact to light touch through out    Labs: Reviewed and significant for   Recent Labs     01/09/23  0949 01/09/23  1531 01/10/23  0436 01/10/23  1551 01/11/23  0525 01/12/23  0430   RBC  --    < > 1.96* 2.50* 2.56* 2.67*   HEMOGLOBIN  --    < > 6.0* 7.8* 7.9* 8.1*   HEMATOCRIT  --    < > 20.2* 25.0* 25.3* 26.1*   PLATELETCT  --    < > 521* 398 387 396   IRON 20*  --  14*  --   --   --    TOTIRONBC 218*  --  195*  --   --   --     < > = values in this interval not displayed.     Recent Labs     01/10/23  0436 01/11/23  0525 01/12/23  0430   SODIUM 137 137 138   POTASSIUM 4.3 3.8 3.8   CHLORIDE 108 110 108   CO2 22 21 22   GLUCOSE 115* 119* 116*   BUN 11 11 11   CREATININE 0.45* 0.41* 0.46*   CALCIUM 7.5* 7.4* 7.7*     Recent Results (from the past 24 hour(s))   Comp Metabolic Panel    Collection " Time: 01/12/23  4:30 AM   Result Value Ref Range    Sodium 138 135 - 145 mmol/L    Potassium 3.8 3.6 - 5.5 mmol/L    Chloride 108 96 - 112 mmol/L    Co2 22 20 - 33 mmol/L    Anion Gap 8.0 7.0 - 16.0    Glucose 116 (H) 65 - 99 mg/dL    Bun 11 8 - 22 mg/dL    Creatinine 0.46 (L) 0.50 - 1.40 mg/dL    Calcium 7.7 (L) 8.5 - 10.5 mg/dL    AST(SGOT) 13 12 - 45 U/L    ALT(SGPT) 10 2 - 50 U/L    Alkaline Phosphatase 108 (H) 30 - 99 U/L    Total Bilirubin 0.2 0.1 - 1.5 mg/dL    Albumin 2.4 (L) 3.2 - 4.9 g/dL    Total Protein 5.1 (L) 6.0 - 8.2 g/dL    Globulin 2.7 1.9 - 3.5 g/dL    A-G Ratio 0.9 g/dL   CBC WITH DIFFERENTIAL    Collection Time: 01/12/23  4:30 AM   Result Value Ref Range    WBC 10.8 4.8 - 10.8 K/uL    RBC 2.67 (L) 4.20 - 5.40 M/uL    Hemoglobin 8.1 (L) 12.0 - 16.0 g/dL    Hematocrit 26.1 (L) 37.0 - 47.0 %    MCV 97.8 81.4 - 97.8 fL    MCH 30.3 27.0 - 33.0 pg    MCHC 31.0 (L) 33.6 - 35.0 g/dL    RDW 64.2 (H) 35.9 - 50.0 fL    Platelet Count 396 164 - 446 K/uL    MPV 9.1 9.0 - 12.9 fL    Neutrophils-Polys 87.40 (H) 44.00 - 72.00 %    Lymphocytes 6.90 (L) 22.00 - 41.00 %    Monocytes 5.00 0.00 - 13.40 %    Eosinophils 0.00 0.00 - 6.90 %    Basophils 0.10 0.00 - 1.80 %    Immature Granulocytes 0.60 0.00 - 0.90 %    Nucleated RBC 0.00 /100 WBC    Neutrophils (Absolute) 9.42 (H) 2.00 - 7.15 K/uL    Lymphs (Absolute) 0.74 (L) 1.00 - 4.80 K/uL    Monos (Absolute) 0.54 0.00 - 0.85 K/uL    Eos (Absolute) 0.00 0.00 - 0.51 K/uL    Baso (Absolute) 0.01 0.00 - 0.12 K/uL    Immature Granulocytes (abs) 0.06 0.00 - 0.11 K/uL    NRBC (Absolute) 0.00 K/uL   MAGNESIUM    Collection Time: 01/12/23  4:30 AM   Result Value Ref Range    Magnesium 2.1 1.5 - 2.5 mg/dL   PHOSPHORUS    Collection Time: 01/12/23  4:30 AM   Result Value Ref Range    Phosphorus 1.7 (L) 2.5 - 4.5 mg/dL   CORRECTED CALCIUM    Collection Time: 01/12/23  4:30 AM   Result Value Ref Range    Correct Calcium 9.0 8.5 - 10.5 mg/dL   ESTIMATED GFR    Collection Time:  01/12/23  4:30 AM   Result Value Ref Range    GFR (CKD-EPI) 102 >60 mL/min/1.73 m 2         ASSESSMENT:  Patient is a 72 y.o. female admitted with right distal femur fracture and transverse myelitis now s/p intramedullary fixation by Dr. Chapa on 1/9/2023    Rehabilitation: Impaired ADLs and mobility  Patient is not a candidate for IPR due to lack of stable discharge support and stage IV gluteal ulcer    All cases are subject to administrative review and recommendations may change    Disposition recommendations:  -Recommend placement in long-term care facility.  In discussion with social work, there is an option at [facility] for her.  -Patient has stage IV gluteal ulcer which is not accepted at [facility] due to potential for worsening wound with aggressive physical therapy.  -PMR will sign off, please reconsult or reach out via Voalte if further evaluation or medical management is requested    Medical Complexity:    Right distal femur fracture  -Status post retrograde intramedullary nail fixation by Dr. Chapa on 1/9/2023  -Continue PT OT while in-house  -Patient will need placement at long-term care facility until she is able to return home  -Pain control per primary team  -Unasyn 4 times daily through 1/25/2023    Gluteal ulcer  -Stage IV  -Wound care ordered today  -Patient refusing to be evaluated for flap surgery  -Patient provided the names of Dr. Nicolás De Jesus who are local providers that have performed the surgery before and may be options for her if she decides she wants to pursue it    Acute blood loss anemia  -Hemoglobin 8.1 today, improving  -Close observation monitoring per primary team      DVT PPX: Lovenox      Thank you for allowing us to participate in the care of this patient.     Patient was seen for 82 minutes on unit/floor of which > 50% of time was spent on counseling and coordination of care regarding the above, including prognosis, risk reduction, benefits of treatment, and options for next  stage of care.    Mike Marte, DO   Physical Medicine and Rehabilitation     Please note that this dictation was created using voice recognition software. I have made every reasonable attempt to correct obvious errors, but there may be errors of grammar and possibly content that I did not discover before finalizing the note.

## 2023-01-13 PROBLEM — J96.01 ACUTE RESPIRATORY FAILURE WITH HYPOXIA (HCC): Status: ACTIVE | Noted: 2023-01-13

## 2023-01-13 LAB
BACTERIA BLD CULT: NORMAL
BACTERIA BLD CULT: NORMAL
BACTERIA SPEC ANAEROBE CULT: NORMAL
BACTERIA SPEC ANAEROBE CULT: NORMAL
BACTERIA TISS AEROBE CULT: ABNORMAL
BACTERIA TISS AEROBE CULT: ABNORMAL
ERYTHROCYTE [DISTWIDTH] IN BLOOD BY AUTOMATED COUNT: 62.5 FL (ref 35.9–50)
GRAM STN SPEC: ABNORMAL
HCT VFR BLD AUTO: 25.3 % (ref 37–47)
HGB BLD-MCNC: 7.8 G/DL (ref 12–16)
LDH SERPL L TO P-CCNC: 186 U/L (ref 107–266)
MCH RBC QN AUTO: 30.5 PG (ref 27–33)
MCHC RBC AUTO-ENTMCNC: 30.8 G/DL (ref 33.6–35)
MCV RBC AUTO: 98.8 FL (ref 81.4–97.8)
NT-PROBNP SERPL IA-MCNC: 2608 PG/ML (ref 0–125)
PHOSPHATE SERPL-MCNC: 2.2 MG/DL (ref 2.5–4.5)
PLATELET # BLD AUTO: 409 K/UL (ref 164–446)
PMV BLD AUTO: 9.1 FL (ref 9–12.9)
RBC # BLD AUTO: 2.56 M/UL (ref 4.2–5.4)
SIGNIFICANT IND 70042: ABNORMAL
SIGNIFICANT IND 70042: NORMAL
SITE SITE: ABNORMAL
SITE SITE: NORMAL
SOURCE SOURCE: ABNORMAL
SOURCE SOURCE: NORMAL
WBC # BLD AUTO: 8.8 K/UL (ref 4.8–10.8)

## 2023-01-13 PROCEDURE — 700102 HCHG RX REV CODE 250 W/ 637 OVERRIDE(OP): Performed by: STUDENT IN AN ORGANIZED HEALTH CARE EDUCATION/TRAINING PROGRAM

## 2023-01-13 PROCEDURE — 84100 ASSAY OF PHOSPHORUS: CPT

## 2023-01-13 PROCEDURE — 36415 COLL VENOUS BLD VENIPUNCTURE: CPT

## 2023-01-13 PROCEDURE — A9270 NON-COVERED ITEM OR SERVICE: HCPCS | Performed by: STUDENT IN AN ORGANIZED HEALTH CARE EDUCATION/TRAINING PROGRAM

## 2023-01-13 PROCEDURE — 83880 ASSAY OF NATRIURETIC PEPTIDE: CPT

## 2023-01-13 PROCEDURE — 700105 HCHG RX REV CODE 258: Performed by: INTERNAL MEDICINE

## 2023-01-13 PROCEDURE — 51798 US URINE CAPACITY MEASURE: CPT

## 2023-01-13 PROCEDURE — 99233 SBSQ HOSP IP/OBS HIGH 50: CPT | Performed by: INTERNAL MEDICINE

## 2023-01-13 PROCEDURE — 83010 ASSAY OF HAPTOGLOBIN QUANT: CPT

## 2023-01-13 PROCEDURE — 700111 HCHG RX REV CODE 636 W/ 250 OVERRIDE (IP): Performed by: INTERNAL MEDICINE

## 2023-01-13 PROCEDURE — 770001 HCHG ROOM/CARE - MED/SURG/GYN PRIV*

## 2023-01-13 PROCEDURE — 700102 HCHG RX REV CODE 250 W/ 637 OVERRIDE(OP): Performed by: INTERNAL MEDICINE

## 2023-01-13 PROCEDURE — 85027 COMPLETE CBC AUTOMATED: CPT

## 2023-01-13 PROCEDURE — A9270 NON-COVERED ITEM OR SERVICE: HCPCS | Performed by: INTERNAL MEDICINE

## 2023-01-13 PROCEDURE — 83615 LACTATE (LD) (LDH) ENZYME: CPT

## 2023-01-13 RX ORDER — ASCORBIC ACID 500 MG
500 TABLET ORAL DAILY
Status: DISCONTINUED | OUTPATIENT
Start: 2023-01-13 | End: 2023-01-22 | Stop reason: HOSPADM

## 2023-01-13 RX ADMIN — SODIUM HYPOCHLORITE 20 ML: 1.25 SOLUTION TOPICAL at 17:41

## 2023-01-13 RX ADMIN — LEVOTHYROXINE SODIUM 50 MCG: 0.05 TABLET ORAL at 06:23

## 2023-01-13 RX ADMIN — OXYBUTYNIN CHLORIDE 10 MG: 10 TABLET, EXTENDED RELEASE ORAL at 17:37

## 2023-01-13 RX ADMIN — HYDROCORTISONE SODIUM SUCCINATE 50 MG: 100 INJECTION, POWDER, FOR SOLUTION INTRAMUSCULAR; INTRAVENOUS at 06:22

## 2023-01-13 RX ADMIN — CYANOCOBALAMIN TAB 500 MCG 500 MCG: 500 TAB at 06:23

## 2023-01-13 RX ADMIN — DOCUSATE SODIUM 50 MG AND SENNOSIDES 8.6 MG 2 TABLET: 8.6; 5 TABLET, FILM COATED ORAL at 17:37

## 2023-01-13 RX ADMIN — AMPICILLIN SODIUM AND SULBACTAM SODIUM 3 G: 2; 1 INJECTION, POWDER, FOR SOLUTION INTRAMUSCULAR; INTRAVENOUS at 06:25

## 2023-01-13 RX ADMIN — HYDROCORTISONE SODIUM SUCCINATE 50 MG: 100 INJECTION, POWDER, FOR SOLUTION INTRAMUSCULAR; INTRAVENOUS at 17:40

## 2023-01-13 RX ADMIN — OXYBUTYNIN CHLORIDE 10 MG: 10 TABLET, EXTENDED RELEASE ORAL at 06:23

## 2023-01-13 RX ADMIN — DOCUSATE SODIUM 50 MG AND SENNOSIDES 8.6 MG 2 TABLET: 8.6; 5 TABLET, FILM COATED ORAL at 06:22

## 2023-01-13 RX ADMIN — AMPICILLIN SODIUM AND SULBACTAM SODIUM 3 G: 2; 1 INJECTION, POWDER, FOR SOLUTION INTRAMUSCULAR; INTRAVENOUS at 00:15

## 2023-01-13 RX ADMIN — ENOXAPARIN SODIUM 40 MG: 40 INJECTION SUBCUTANEOUS at 17:39

## 2023-01-13 RX ADMIN — FUROSEMIDE 20 MG: 10 INJECTION, SOLUTION INTRAMUSCULAR; INTRAVENOUS at 17:39

## 2023-01-13 RX ADMIN — DIBASIC SODIUM PHOSPHATE, MONOBASIC POTASSIUM PHOSPHATE AND MONOBASIC SODIUM PHOSPHATE 500 MG: 852; 155; 130 TABLET ORAL at 08:45

## 2023-01-13 RX ADMIN — SODIUM HYPOCHLORITE 10 ML: 1.25 SOLUTION TOPICAL at 06:30

## 2023-01-13 RX ADMIN — OXYCODONE HYDROCHLORIDE 5 MG: 5 TABLET ORAL at 17:38

## 2023-01-13 RX ADMIN — OXYCODONE HYDROCHLORIDE AND ACETAMINOPHEN 500 MG: 500 TABLET ORAL at 17:37

## 2023-01-13 RX ADMIN — FUROSEMIDE 20 MG: 10 INJECTION, SOLUTION INTRAMUSCULAR; INTRAVENOUS at 06:22

## 2023-01-13 RX ADMIN — DIBASIC SODIUM PHOSPHATE, MONOBASIC POTASSIUM PHOSPHATE AND MONOBASIC SODIUM PHOSPHATE 500 MG: 852; 155; 130 TABLET ORAL at 17:39

## 2023-01-13 RX ADMIN — OXYCODONE HYDROCHLORIDE 5 MG: 5 TABLET ORAL at 01:14

## 2023-01-13 RX ADMIN — AMPICILLIN SODIUM AND SULBACTAM SODIUM 3 G: 2; 1 INJECTION, POWDER, FOR SOLUTION INTRAMUSCULAR; INTRAVENOUS at 17:37

## 2023-01-13 RX ADMIN — OXYCODONE HYDROCHLORIDE 5 MG: 5 TABLET ORAL at 06:23

## 2023-01-13 RX ADMIN — THERA TABS 1 TABLET: TAB at 17:38

## 2023-01-13 RX ADMIN — OXYCODONE HYDROCHLORIDE 5 MG: 5 TABLET ORAL at 12:45

## 2023-01-13 RX ADMIN — OXYCODONE HYDROCHLORIDE 5 MG: 5 TABLET ORAL at 21:56

## 2023-01-13 RX ADMIN — AMPICILLIN SODIUM AND SULBACTAM SODIUM 3 G: 2; 1 INJECTION, POWDER, FOR SOLUTION INTRAMUSCULAR; INTRAVENOUS at 11:35

## 2023-01-13 ASSESSMENT — PAIN DESCRIPTION - PAIN TYPE
TYPE: SURGICAL PAIN
TYPE: ACUTE PAIN;SURGICAL PAIN
TYPE: SURGICAL PAIN
TYPE: SURGICAL PAIN;ACUTE PAIN
TYPE: ACUTE PAIN

## 2023-01-13 ASSESSMENT — ENCOUNTER SYMPTOMS
NAUSEA: 0
DIZZINESS: 0
VOMITING: 0
MYALGIAS: 1
BLURRED VISION: 0
FEVER: 0
DIARRHEA: 0
SHORTNESS OF BREATH: 1
SORE THROAT: 0
LOSS OF CONSCIOUSNESS: 0
PALPITATIONS: 0
FOCAL WEAKNESS: 1
DOUBLE VISION: 0
ABDOMINAL PAIN: 0
BACK PAIN: 0
HEADACHES: 0
COUGH: 0

## 2023-01-13 NOTE — PROGRESS NOTES
Pt arrived to unit with transport  Received report from ICU RN  Pt AOx4  Purewick in place, awaiting post holguin removal void  6LO2 in place  Educated on unit, oriented to room, TV, call light  Connected phone  No needs at this time  Call light and belongings within reach

## 2023-01-13 NOTE — PROGRESS NOTES
Assumed care of patient at bedside report from NOC RN. Updated on POC. Patient currently A & O x 4; on 6 L O2 via nc; up max assist with complaints of acute pain. Assessment completed.  Call light within reach. Whiteboard updated. Fall precautions in place. Bed locked and in lowest position. All questions answered. No other needs indicated at this time.

## 2023-01-13 NOTE — CARE PLAN
The patient is Stable - Low risk of patient condition declining or worsening    Shift Goals  Clinical Goals: pian control, rest, wound care  Patient Goals: pain control  Family Goals: NA    Progress made toward(s) clinical / shift goals:    Problem: Knowledge Deficit - Standard  Goal: Patient and family/care givers will demonstrate understanding of plan of care, disease process/condition, diagnostic tests and medications  Outcome: Progressing     Problem: Skin Integrity  Goal: Skin integrity is maintained or improved  Outcome: Progressing     Problem: Pain - Standard  Goal: Alleviation of pain or a reduction in pain to the patient’s comfort goal  Outcome: Progressing       Patient is not progressing towards the following goals: N/A

## 2023-01-13 NOTE — PROGRESS NOTES
Dressing change and wound care completed.  Patient to transfer to room T- 423-2 report called to GSU CAREN Nguyễn. All belongings and medications sent with patient .

## 2023-01-13 NOTE — DISCHARGE PLANNING
Per Dr Marte patient is not a candidate for [facility] level of care    Disposition recommendations:  -Recommend placement in long-term care facility.  In discussion with social work, there is an option at [facility] for her.  -Patient has stage IV gluteal ulcer which is not accepted at [facility] due to potential for worsening wound with aggressive physical therapy.  -PMR will sign off, please reconsult or reach out via Voalte if further evaluation or medical management is requested    TCC no longer following

## 2023-01-13 NOTE — PROGRESS NOTES
4 Eyes Skin Assessment Completed by CAREN Nguyễn and CAREN Dixon.    Head WDL  Ears WDL  Nose WDL  Mouth WDL  Neck WDL  Breast/Chest WDL  Shoulder Blades WDL  Spine WDL  (R) Arm/Elbow/Hand WDL  (L) Arm/Elbow/Hand Redness, Bruising, and Swelling to forearm  Abdomen WDL  Groin WDL  Scrotum/Coccyx/Buttocks Right ishium Pressure injury, packing and mepilex in place  (R) Leg 3+Edema and Incision upper thigh with gauze dressing, knee with ace wrap  (L) Leg 2+Edema  (R) Heel/Foot/Toe heel Non-Blanching, Discoloration, Bruising, and Edema with heel mepilex  (L) Heel/Foot/Toe heel Non-Blanching, Bruising, and Edema with heel mepilex          Devices In Places Pulse Ox, SCD's, and Nasal Cannula      Interventions In Place NC W/Ear Foams, Heel Mepilex, Sacral Mepilex, Pillows, Low Air Loss Mattress, and Heels Loaded W/Pillows    Possible Skin Injury Yes    Pictures Uploaded Into Epic Yes  Wound Consult Placed N/A  RN Wound Prevention Protocol Ordered Yes

## 2023-01-14 ENCOUNTER — APPOINTMENT (OUTPATIENT)
Dept: CARDIOLOGY | Facility: MEDICAL CENTER | Age: 73
DRG: 853 | End: 2023-01-14
Attending: INTERNAL MEDICINE
Payer: MEDICARE

## 2023-01-14 LAB
ANION GAP SERPL CALC-SCNC: 7 MMOL/L (ref 7–16)
BUN SERPL-MCNC: 17 MG/DL (ref 8–22)
CALCIUM SERPL-MCNC: 7.6 MG/DL (ref 8.5–10.5)
CHLORIDE SERPL-SCNC: 107 MMOL/L (ref 96–112)
CO2 SERPL-SCNC: 26 MMOL/L (ref 20–33)
CREAT SERPL-MCNC: 0.55 MG/DL (ref 0.5–1.4)
ERYTHROCYTE [DISTWIDTH] IN BLOOD BY AUTOMATED COUNT: 63.7 FL (ref 35.9–50)
GFR SERPLBLD CREATININE-BSD FMLA CKD-EPI: 98 ML/MIN/1.73 M 2
GLUCOSE SERPL-MCNC: 108 MG/DL (ref 65–99)
HCT VFR BLD AUTO: 26.2 % (ref 37–47)
HGB BLD-MCNC: 7.9 G/DL (ref 12–16)
LV EJECT FRACT MOD 2C 99903: 68.71
LV EJECT FRACT MOD 4C 99902: 59.9
LV EJECT FRACT MOD BP 99901: 64.71
MAGNESIUM SERPL-MCNC: 1.8 MG/DL (ref 1.5–2.5)
MCH RBC QN AUTO: 30.4 PG (ref 27–33)
MCHC RBC AUTO-ENTMCNC: 30.2 G/DL (ref 33.6–35)
MCV RBC AUTO: 100.8 FL (ref 81.4–97.8)
PHOSPHATE SERPL-MCNC: 3.2 MG/DL (ref 2.5–4.5)
PLATELET # BLD AUTO: 409 K/UL (ref 164–446)
PMV BLD AUTO: 9.1 FL (ref 9–12.9)
POTASSIUM SERPL-SCNC: 3.2 MMOL/L (ref 3.6–5.5)
RBC # BLD AUTO: 2.6 M/UL (ref 4.2–5.4)
SODIUM SERPL-SCNC: 140 MMOL/L (ref 135–145)
WBC # BLD AUTO: 9.4 K/UL (ref 4.8–10.8)

## 2023-01-14 PROCEDURE — 93306 TTE W/DOPPLER COMPLETE: CPT

## 2023-01-14 PROCEDURE — A9270 NON-COVERED ITEM OR SERVICE: HCPCS | Performed by: STUDENT IN AN ORGANIZED HEALTH CARE EDUCATION/TRAINING PROGRAM

## 2023-01-14 PROCEDURE — 36415 COLL VENOUS BLD VENIPUNCTURE: CPT

## 2023-01-14 PROCEDURE — A9270 NON-COVERED ITEM OR SERVICE: HCPCS | Performed by: INTERNAL MEDICINE

## 2023-01-14 PROCEDURE — 700105 HCHG RX REV CODE 258: Performed by: INTERNAL MEDICINE

## 2023-01-14 PROCEDURE — 700102 HCHG RX REV CODE 250 W/ 637 OVERRIDE(OP): Performed by: INTERNAL MEDICINE

## 2023-01-14 PROCEDURE — 770001 HCHG ROOM/CARE - MED/SURG/GYN PRIV*

## 2023-01-14 PROCEDURE — 700111 HCHG RX REV CODE 636 W/ 250 OVERRIDE (IP): Performed by: INTERNAL MEDICINE

## 2023-01-14 PROCEDURE — 80048 BASIC METABOLIC PNL TOTAL CA: CPT

## 2023-01-14 PROCEDURE — 85027 COMPLETE CBC AUTOMATED: CPT

## 2023-01-14 PROCEDURE — 99232 SBSQ HOSP IP/OBS MODERATE 35: CPT | Performed by: INTERNAL MEDICINE

## 2023-01-14 PROCEDURE — 93306 TTE W/DOPPLER COMPLETE: CPT | Mod: 26 | Performed by: INTERNAL MEDICINE

## 2023-01-14 PROCEDURE — 83735 ASSAY OF MAGNESIUM: CPT

## 2023-01-14 PROCEDURE — 700102 HCHG RX REV CODE 250 W/ 637 OVERRIDE(OP): Performed by: STUDENT IN AN ORGANIZED HEALTH CARE EDUCATION/TRAINING PROGRAM

## 2023-01-14 PROCEDURE — 84100 ASSAY OF PHOSPHORUS: CPT

## 2023-01-14 RX ORDER — POTASSIUM CHLORIDE 20 MEQ/1
20 TABLET, EXTENDED RELEASE ORAL 2 TIMES DAILY
Status: DISCONTINUED | OUTPATIENT
Start: 2023-01-14 | End: 2023-01-15

## 2023-01-14 RX ADMIN — OXYCODONE HYDROCHLORIDE AND ACETAMINOPHEN 500 MG: 500 TABLET ORAL at 05:45

## 2023-01-14 RX ADMIN — AMPICILLIN SODIUM AND SULBACTAM SODIUM 3 G: 2; 1 INJECTION, POWDER, FOR SOLUTION INTRAMUSCULAR; INTRAVENOUS at 12:22

## 2023-01-14 RX ADMIN — AMPICILLIN SODIUM AND SULBACTAM SODIUM 3 G: 2; 1 INJECTION, POWDER, FOR SOLUTION INTRAMUSCULAR; INTRAVENOUS at 17:54

## 2023-01-14 RX ADMIN — AMPICILLIN SODIUM AND SULBACTAM SODIUM 3 G: 2; 1 INJECTION, POWDER, FOR SOLUTION INTRAMUSCULAR; INTRAVENOUS at 23:39

## 2023-01-14 RX ADMIN — DOCUSATE SODIUM 50 MG AND SENNOSIDES 8.6 MG 2 TABLET: 8.6; 5 TABLET, FILM COATED ORAL at 05:44

## 2023-01-14 RX ADMIN — HYDROCORTISONE SODIUM SUCCINATE 50 MG: 100 INJECTION, POWDER, FOR SOLUTION INTRAMUSCULAR; INTRAVENOUS at 05:45

## 2023-01-14 RX ADMIN — AMPICILLIN SODIUM AND SULBACTAM SODIUM 3 G: 2; 1 INJECTION, POWDER, FOR SOLUTION INTRAMUSCULAR; INTRAVENOUS at 00:40

## 2023-01-14 RX ADMIN — POTASSIUM CHLORIDE 20 MEQ: 1500 TABLET, EXTENDED RELEASE ORAL at 17:44

## 2023-01-14 RX ADMIN — OXYCODONE HYDROCHLORIDE 5 MG: 5 TABLET ORAL at 02:13

## 2023-01-14 RX ADMIN — OXYBUTYNIN CHLORIDE 10 MG: 10 TABLET, EXTENDED RELEASE ORAL at 05:44

## 2023-01-14 RX ADMIN — THERA TABS 1 TABLET: TAB at 05:45

## 2023-01-14 RX ADMIN — SODIUM HYPOCHLORITE 1 ML: 1.25 SOLUTION TOPICAL at 05:52

## 2023-01-14 RX ADMIN — OXYCODONE HYDROCHLORIDE 5 MG: 5 TABLET ORAL at 14:54

## 2023-01-14 RX ADMIN — LEVOTHYROXINE SODIUM 50 MCG: 0.05 TABLET ORAL at 05:44

## 2023-01-14 RX ADMIN — ENOXAPARIN SODIUM 40 MG: 40 INJECTION SUBCUTANEOUS at 17:44

## 2023-01-14 RX ADMIN — FUROSEMIDE 20 MG: 10 INJECTION, SOLUTION INTRAMUSCULAR; INTRAVENOUS at 17:42

## 2023-01-14 RX ADMIN — HYDROCORTISONE SODIUM SUCCINATE 25 MG: 100 INJECTION, POWDER, FOR SOLUTION INTRAMUSCULAR; INTRAVENOUS at 17:40

## 2023-01-14 RX ADMIN — SODIUM HYPOCHLORITE 10 ML: 1.25 SOLUTION TOPICAL at 17:55

## 2023-01-14 RX ADMIN — AMPICILLIN SODIUM AND SULBACTAM SODIUM 3 G: 2; 1 INJECTION, POWDER, FOR SOLUTION INTRAMUSCULAR; INTRAVENOUS at 05:45

## 2023-01-14 RX ADMIN — OXYCODONE HYDROCHLORIDE 5 MG: 5 TABLET ORAL at 10:21

## 2023-01-14 RX ADMIN — OXYBUTYNIN CHLORIDE 10 MG: 10 TABLET, EXTENDED RELEASE ORAL at 17:44

## 2023-01-14 RX ADMIN — DOCUSATE SODIUM 50 MG AND SENNOSIDES 8.6 MG 2 TABLET: 8.6; 5 TABLET, FILM COATED ORAL at 17:44

## 2023-01-14 RX ADMIN — OXYCODONE HYDROCHLORIDE 5 MG: 5 TABLET ORAL at 06:21

## 2023-01-14 RX ADMIN — POTASSIUM CHLORIDE 20 MEQ: 1500 TABLET, EXTENDED RELEASE ORAL at 08:24

## 2023-01-14 RX ADMIN — CYANOCOBALAMIN TAB 500 MCG 500 MCG: 500 TAB at 05:45

## 2023-01-14 RX ADMIN — OXYCODONE HYDROCHLORIDE 5 MG: 5 TABLET ORAL at 20:06

## 2023-01-14 RX ADMIN — FUROSEMIDE 20 MG: 10 INJECTION, SOLUTION INTRAMUSCULAR; INTRAVENOUS at 05:45

## 2023-01-14 ASSESSMENT — COGNITIVE AND FUNCTIONAL STATUS - GENERAL
DAILY ACTIVITIY SCORE: 20
TURNING FROM BACK TO SIDE WHILE IN FLAT BAD: A LOT
MOBILITY SCORE: 7
CLIMB 3 TO 5 STEPS WITH RAILING: TOTAL
MOVING FROM LYING ON BACK TO SITTING ON SIDE OF FLAT BED: UNABLE
SUGGESTED CMS G CODE MODIFIER DAILY ACTIVITY: CJ
STANDING UP FROM CHAIR USING ARMS: TOTAL
SUGGESTED CMS G CODE MODIFIER MOBILITY: CM
TOILETING: A LOT
DRESSING REGULAR LOWER BODY CLOTHING: A LITTLE
MOVING TO AND FROM BED TO CHAIR: UNABLE
WALKING IN HOSPITAL ROOM: TOTAL
HELP NEEDED FOR BATHING: A LITTLE

## 2023-01-14 ASSESSMENT — FIBROSIS 4 INDEX: FIB4 SCORE: 0.7

## 2023-01-14 ASSESSMENT — ENCOUNTER SYMPTOMS
MYALGIAS: 1
SHORTNESS OF BREATH: 0
FOCAL WEAKNESS: 1
ABDOMINAL PAIN: 0
DIARRHEA: 0
COUGH: 0
DIZZINESS: 0
SORE THROAT: 0
HEADACHES: 0
BACK PAIN: 0
DOUBLE VISION: 0
PALPITATIONS: 0
LOSS OF CONSCIOUSNESS: 0
NAUSEA: 0
BLURRED VISION: 0
FEVER: 0
VOMITING: 0

## 2023-01-14 ASSESSMENT — PAIN DESCRIPTION - PAIN TYPE
TYPE: ACUTE PAIN

## 2023-01-14 NOTE — DISCHARGE PLANNING
Received Choice form at 1216  Agency/Facility Name: [facility]   Referral sent per Choice form @ 1227     Received Choice form at 1217  Agency/Facility Name: [facility]   Referral sent per Choice form @ 1231

## 2023-01-14 NOTE — PROGRESS NOTES
Received report from day shift RN. Assumed patient care.    Patient is A+O x4.  Tolerating regular diet. Denies N/V.  Denies chest pain or SOB.  Pain 5 on a 0-10 pain scale.   Parker in place, + urine output, - BM. Last BM 1/12.  Q2 turns in place,     Plan of care discussed, all questions answered. Educated on the importance of calling before getting OOB and pt verbalizes understanding. Educated regarding importance of oral care. Oral care kit at bedside. Call light is within reach, treaded slipper socks on, bed in lowest/ locked position, hourly rounding in place, all needs met at this time

## 2023-01-14 NOTE — PROGRESS NOTES
"   Orthopaedic Progress Note    Interval changes:  Patient doing well     Right hip dressing with min serosanguinous noted -discussed with nursing who will change later an reapply ace wrap  Cleared for DC by ortho pending trauma clearance    ROS - Patient denies any new issues.  Pain well controlled.    /67   Pulse 72   Temp 36.4 °C (97.5 °F) (Temporal)   Resp 17   Ht 1.676 m (5' 5.98\")   Wt 70.2 kg (154 lb 12.2 oz)   SpO2 88%       Patient seen and examined  No acute distress  Breathing non labored  RRR  RLE dressings with min sanguinous, no change from prior neuro status, no issues.     Recent Labs     01/12/23  0430 01/13/23  0438 01/14/23  0250   WBC 10.8 8.8 9.4   RBC 2.67* 2.56* 2.60*   HEMOGLOBIN 8.1* 7.8* 7.9*   HEMATOCRIT 26.1* 25.3* 26.2*   MCV 97.8 98.8* 100.8*   MCH 30.3 30.5 30.4   MCHC 31.0* 30.8* 30.2*   RDW 64.2* 62.5* 63.7*   PLATELETCT 396 409 409   MPV 9.1 9.1 9.1       Active Hospital Problems    Diagnosis     Acute respiratory failure with hypoxia ([facility]) [J96.01]     Closed bicondylar fracture of right femur ([facility]) [S72.421A, S72.431A]     Septic shock ([facility]) [A41.9, R65.21]     Septic joint of left knee joint ([facility]) [M00.9]     Microcytic anemia [D50.9]     Mild protein-calorie malnutrition ([facility]) [E44.1]     Neuropathic bladder [N31.9]     Hypocalcemia [E83.51]        Assessment/Plan:  Patient doing well     Right hip dressing with min serosanguinous noted -discussed with nursing who will change later an reapply ace wrap  Cleared for DC by ortho pending trauma clearance  POD#5 S/P   1.  Wound debridement with multiple soft tissue and bone cultures.  2.  Retrograde intramedullary femoral fixation with cement supplementation  Wt bearing status - WBAT  Wound care/Drains - Dressings to be changed every other day by nursing  Future Procedures - none planned   Lovenox: Start 1/10, Duration-until ambulatory > 150'  Sutures/Staples out- 14 days post operatively  PT/OT-initiated  Antibiotics: " unasyn 3g IV q8   DVT Prophylaxis- TEDS/SCDs/Foot pumps  Parker-none  Case Coordination for Discharge Planning - Disposition home

## 2023-01-14 NOTE — PROGRESS NOTES
Received report from previous shift RN  Assessment complete.  A&O x 4. Patient calls appropriately.  Patient paraplegic x2 assist to mobilize . Bed alarm on.   Patient has 5/10 pain. Pain managed with prescribed medications.  Denies N&V. Tolerating reg diet.  Skin per flowsheets. Q2 turns in place.  + void via holguin, + flatus, - BM.  Patient denies SOB.  SCD's on.  Patient pleasant and cooperative with plan of care.  Review plan with of care with patient. Call light and personal belongings with in reach. Hourly rounding in place. All needs met at this time.

## 2023-01-14 NOTE — CARE PLAN
The patient is Stable - Low risk of patient condition declining or worsening    Shift Goals  Clinical Goals: pain control; Q2 turns  Patient Goals: pain control  Family Goals: NA    Progress made toward(s) clinical / shift goals:  pt verbalizing understanding of plan of care. Pain frequently assessed. Medicated per MAR. Q2 turns in place.TAPs system in use.     Patient is not progressing towards the following goals:

## 2023-01-14 NOTE — DISCHARGE PLANNING
Case Management Discharge Planning    Admission Date: 1/8/2023  GMLOS: 9.6  ALOS: 6    6-Clicks ADL Score: 20  6-Clicks Mobility Score: 7  PT and/or OT Eval ordered: Yes  Post-acute Referrals Ordered: Yes  Post-acute Choice Obtained: Yes  Has referral(s) been sent to post-acute provider:  Yes      Anticipated Discharge Dispo: Discharge Disposition: D/T to SNF with Medicare cert in anticipation of skilled care (03)    DME Needed: No    Action(s) Taken: Updated Provider/Nurse on Discharge Plan    This RN CM spoke with Kee, Pt's  . Kee and Pt live together in a one story house in [place] . Pt owns a FWW and a wheelchair.  Explained to Kee that [facility] declined Pt and recommendation is SNF.  Per Kee he prefers the [facility] SNF as much as possible as they are from [place],. If declined  , per Kee okay to send referrals for  SNFs in [place]/ [place]. Choices were faxed to  Barb DPA.     Escalations Completed: None    Medically Clear: No    Next Steps:   This RN CM to continue to assist Pt with discharge as needed    Barriers to Discharge:   Pending medical clearance  Pending placement    Is the patient up for discharge tomorrow: No

## 2023-01-14 NOTE — CARE PLAN
The patient is Stable - Low risk of patient condition declining or worsening    Shift Goals  Clinical Goals: Pain control, Q2 turns  Patient Goals: Pain control  Family Goals: NA    Problem: Knowledge Deficit - Standard  Goal: Patient and family/care givers will demonstrate understanding of plan of care, disease process/condition, diagnostic tests and medications  Outcome: Progressing     Problem: Skin Integrity  Goal: Skin integrity is maintained or improved  Outcome: Progressing     Problem: Pain - Standard  Goal: Alleviation of pain or a reduction in pain to the patient’s comfort goal  Outcome: Progressing     Progress made toward(s) clinical / shift goals:      Patient is not progressing towards the following goals:

## 2023-01-14 NOTE — CARE PLAN
The patient is Stable - Low risk of patient condition declining or worsening    Shift Goals  Clinical Goals: skin/wound care, [id number], pain control  Patient Goals: pain control  Family Goals: NA    Progress made toward(s) clinical / shift goals:  Q2 turns in place, Q shift dressing changes on ishium PI, medicating for pain PRN per MAR, placed holguin this shift for retention    Patient is not progressing towards the following goals:

## 2023-01-14 NOTE — PROGRESS NOTES
Hospital Medicine Daily Progress Note    Date of Service  1/13/2023    Chief Complaint  Dyana Conway is a 72 y.o. female admitted 1/8/2023 with knee pain    Hospital Course    71yo admitted 1/8.  PMHx paraplegia due to transversemyelitis, sacral decub, COPD, CHF, DM, glaucoma, CAD who Presented after a fall from her W/C with c/o knee pain.  Found to have a comminuted Fx of distal femur underlying a septic joint.  Underwent ORIF and I&D on 1/9 and then transferred to ICU due to post op hypotension.  Started on IV pressors.  Given one unit PRBCs and required stress dose steroids (cortisol 2)    Interval Problem Update  Septic arthritis-remains afebrile, WBC has normalized and tolerating IV abx's without issue. Pain is decently controlled  Sacral Decubitus ulcer stage IV-present on admission, I personally spoke with the dietiatian to start MVI and Vitamin C tablet to augment healing.   Hypoxia-no improvement, remains on 5-6 LNC. BNP as noted below. She normally does not use o2. She is on lasix 20 mg IV BID and she is retaining large amounts of urine, bladder scan shows >999 ML.     I have discussed this patient's plan of care and discharge plan at IDT rounds today with Case Management, Nursing, Nursing leadership, and other members of the IDT team.    Consultants/Specialty  orthopedics    Code Status  Full Code    Disposition  Patient is not medically cleared for discharge.   Anticipate discharge to to an inpatient rehabilitation hospital. Acute rehab declined patient. SNF's are pending.   I have placed the appropriate orders for post-discharge needs.    Review of Systems  Review of Systems   Constitutional:  Negative for fever.   HENT:  Negative for nosebleeds and sore throat.    Eyes:  Negative for blurred vision and double vision.   Respiratory:  Positive for shortness of breath (mild). Negative for cough.    Cardiovascular:  Negative for chest pain, palpitations and leg swelling.   Gastrointestinal:  Negative  for abdominal pain, diarrhea, nausea and vomiting.   Genitourinary:  Negative for dysuria and urgency.   Musculoskeletal:  Positive for joint pain and myalgias. Negative for back pain.        R knee pain, pain levels are tolerable   Skin:  Negative for rash.   Neurological:  Positive for focal weakness. Negative for dizziness, loss of consciousness and headaches.   All other systems reviewed and are negative.     Physical Exam  Temp:  [36.5 °C (97.7 °F)-37.2 °C (99 °F)] 37.2 °C (99 °F)  Pulse:  [63-95] 63  Resp:  [14-25] 20  BP: ()/(61-69) 100/66  SpO2:  [89 %-98 %] 91 %    Physical Exam  Constitutional:       General: She is not in acute distress.     Appearance: Normal appearance. She is well-developed. She is not diaphoretic.   HENT:      Head: Normocephalic and atraumatic.      Nose:      Comments: NC in place  Neck:      Vascular: No JVD.   Cardiovascular:      Rate and Rhythm: Normal rate and regular rhythm.      Heart sounds: Murmur heard.   Pulmonary:      Effort: Pulmonary effort is normal. No respiratory distress.      Breath sounds: No stridor. Rales (Bibasilar) present. No wheezing.   Abdominal:      Palpations: Abdomen is soft.      Tenderness: There is no abdominal tenderness. There is no guarding or rebound.   Musculoskeletal:         General: No tenderness.      Right lower leg: Edema present.      Left lower leg: Edema present.      Comments: R>L lower leg edema  Feet warm and pink, normal cap refill  R knee with post op dressings in place  No clear changes noted   Skin:     General: Skin is warm and dry.      Findings: No rash.   Neurological:      Mental Status: She is alert and oriented to person, place, and time. Mental status is at baseline.   Psychiatric:         Mood and Affect: Mood normal.         Behavior: Behavior normal.         Thought Content: Thought content normal.       Fluids    Intake/Output Summary (Last 24 hours) at 1/13/2023 1613  Last data filed at 1/13/2023 1415  Gross  per 24 hour   Intake --   Output 1500 ml   Net -1500 ml       Laboratory  Recent Labs     01/11/23  0525 01/12/23  0430 01/13/23  0438   WBC 9.5 10.8 8.8   RBC 2.56* 2.67* 2.56*   HEMOGLOBIN 7.9* 8.1* 7.8*   HEMATOCRIT 25.3* 26.1* 25.3*   MCV 98.8* 97.8 98.8*   MCH 30.9 30.3 30.5   MCHC 31.2* 31.0* 30.8*   RDW 65.5* 64.2* 62.5*   PLATELETCT 387 396 409   MPV 8.9* 9.1 9.1     Recent Labs     01/11/23  0525 01/12/23  0430   SODIUM 137 138   POTASSIUM 3.8 3.8   CHLORIDE 110 108   CO2 21 22   GLUCOSE 119* 116*   BUN 11 11   CREATININE 0.41* 0.46*   CALCIUM 7.4* 7.7*                   Imaging  DX-CHEST-PORTABLE (1 VIEW)   Final Result      Small BILATERAL pleural effusions with overlying atelectasis or airspace disease      DX-FEMUR-2+ RIGHT   Final Result      Postoperative change from resection of the distal RIGHT femur with internal fixation.      DX-PORTABLE FLUORO > 1 HOUR   Final Result      Portable fluoroscopy utilized for 1 minute 42 seconds.         INTERPRETING LOCATION: [address] [place] [address], [address]      DX-FEMUR-2+ RIGHT   Final Result      Portable intraoperative imaging with findings as described above.      CT-FOREIGN FILM CAT SCAN   Final Result      DX-CHEST-PORTABLE (1 VIEW)   Final Result         1. No acute cardiopulmonary abnormalities are identified.      CT-KNEE W/O PLUS RECONS RIGHT   Final Result         1. There is an open wound in the anterior aspect with small amount of gas.      2. Subacute significant displays comminuted fracture of the distal femur with multiple displaced fragments.      3. Significant fluid and heterotopic calcification seen in the lateral aspect of the distal femoral fracture with rarefaction of the bones. This could reflects subacute nature of the fracture with superimposed infection/osteomyelitis given the open    wound. Underlying lytic mass cannot be excluded. MRI with contrast may be helpful for further evaluation.      CT-CSPINE WITHOUT PLUS RECONS   Final Result          1. No acute fracture from C1 through T1 is visualized.         CT-HEAD W/O   Final Result         1. No acute intracranial abnormality. No evidence of acute intracranial hemorrhage or mass lesion.                     EC-ECHOCARDIOGRAM COMPLETE W/ CONT    (Results Pending)        Assessment/Plan  * Septic shock (HCC)- (present on admission)  Assessment & Plan  Resolved  Remains on IV steroids, will do a slow taper, initiating today on 1/13, I placed orders for this change  Vitals are ok on 1/13        Acute respiratory failure with hypoxia (HCC)- (present on admission)  Assessment & Plan  Remains signfiicantly hypoxic on 1/13, still requiring 5-6 L NC  Pro-bnp is elevated at 2200, I personally reviewed this value on 1/13  No prior, I ordered an ECHO on 1/13  Continue IV lasix 20 mg BID  Monitor urine output and monitor electrolytes daily while on IV diuretics, which is high risk  Try to wean O2 as much as possible, no clear underlying pulmonary disease    Closed bicondylar fracture of right femur (HCC)- (present on admission)  Assessment & Plan  S/p ORIF  Ortho following  Pain is well controlled  Working on SNF referrals    Anemia  Assessment & Plan  Hb 7.8 on admission  Check iron studies, folate, B12 and TSH  Monitor CBC, transfuse for hemoglobin less than 7      Hypocalcemia- (present on admission)  Assessment & Plan  Correct for low albumin, is normal    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)  Assessment & Plan  Oxybutinin, not working well  Patient is retaining large amounts of urine, bladder scan >999 ML urine  Likely related to prior diagnosis of transverse myelitis  Placed holguin for severe urinary retention  Monitor urine output closely, especially given she is on IV lasix 20 mg BID    Mild protein-calorie malnutrition (HCC)- (present on admission)  Assessment & Plan  Nutrition consult  Supplements  E/o subcutaneous fat loss and muscle wasting noted on exam    Microcytic anemia- (present on  admission)  Assessment & Plan  Fe, %sat and TIBC low: unlikely Fe deficiency  ?chronic blood loss from wound or GI tract  Folic acid WNL  B12 high  TSH 6.3 FT4 WNL  Hb levels remains stable on 1/13  I ordered an LDH and haptoglobin level    Septic joint of left knee joint (HCC)- (present on admission)  Assessment & Plan  Clinically remains stable, WBC has normalized and remains afebrile  I personally reviewed the CBC on 1/13  No changes to current management  Wound cultures showing Amp sensitive EColi and Enterococcus  Reviewed with ID who recommend amp/sulbactam here in house with transition to Amox/Clav for a total of 6wks from I&D: stop date 2/20           VTE prophylaxis: enoxaparin ppx    I have performed a physical exam and reviewed and updated ROS and Plan today (1/13/2023). In review of yesterday's note (1/12/2023), there are no changes except as documented above.

## 2023-01-14 NOTE — PROGRESS NOTES
Hospital Medicine Daily Progress Note    Date of Service  1/14/2023    Chief Complaint  Dyana Conway is a 72 y.o. female admitted 1/8/2023 with knee pain    Hospital Course    73yo admitted 1/8.  PMHx paraplegia due to transversemyelitis, sacral decub, COPD, CHF, DM, glaucoma, CAD who Presented after a fall from her W/C with c/o knee pain.  Found to have a comminuted Fx of distal femur underlying a septic joint.  Underwent ORIF and I&D on 1/9 and then transferred to ICU due to post op hypotension.  Started on IV pressors.  Given one unit PRBCs and required stress dose steroids (cortisol 2)    Interval Problem Update  Septic arthritis-fafebrile. Minimal pain around her surgical incision site, no drainage noted.   Sacral Decubitus ulcer stage IV-no changes.   Hypoxia-remains without improvement, still requiring 6 L NC. Denies any SOB and does not use oxygen ever at home. No cough, denies any new pulmonary symptoms.     I have discussed this patient's plan of care and discharge plan at IDT rounds today with Case Management, Nursing, Nursing leadership, and other members of the IDT team.    Consultants/Specialty  orthopedics    Code Status  Full Code    Disposition  Patient is not medically cleared for discharge.   Anticipate discharge to to an inpatient rehabilitation hospital. Acute rehab declined patient. SNF's are pending.   I have placed the appropriate orders for post-discharge needs.    Review of Systems  Review of Systems   Constitutional:  Negative for fever.   HENT:  Negative for nosebleeds and sore throat.    Eyes:  Negative for blurred vision and double vision.   Respiratory:  Negative for cough and shortness of breath.    Cardiovascular:  Negative for chest pain, palpitations and leg swelling.   Gastrointestinal:  Negative for abdominal pain, diarrhea, nausea and vomiting.   Genitourinary:  Negative for dysuria and urgency.   Musculoskeletal:  Positive for joint pain and myalgias. Negative for back  pain.        R knee pain, pain levels remain unchanged today     Skin:  Negative for rash.   Neurological:  Positive for focal weakness (at her baseline). Negative for dizziness, loss of consciousness and headaches.   All other systems reviewed and are negative.     Physical Exam  Temp:  [36.4 °C (97.5 °F)-37.1 °C (98.8 °F)] 36.4 °C (97.5 °F)  Pulse:  [71-90] 72  Resp:  [17-18] 17  BP: ()/(61-68) 110/67  SpO2:  [88 %-96 %] 88 %    Physical Exam  Constitutional:       General: She is not in acute distress.     Appearance: Normal appearance. She is well-developed. She is not diaphoretic.   HENT:      Head: Normocephalic and atraumatic.      Nose:      Comments: NC in place  Neck:      Vascular: No JVD.   Cardiovascular:      Rate and Rhythm: Normal rate and regular rhythm.      Heart sounds: Murmur heard.   Pulmonary:      Effort: Pulmonary effort is normal. No respiratory distress.      Breath sounds: No stridor. Rales (Bibasilar) present. No wheezing.   Abdominal:      Palpations: Abdomen is soft.      Tenderness: There is no abdominal tenderness. There is no guarding or rebound.   Musculoskeletal:         General: No tenderness.      Right lower leg: Edema present.      Left lower leg: Edema present.      Comments: R>L lower leg edema, 1+ pitting edema  Feet warm and pink, normal cap refill  R knee with post op dressings in place, some serosanguinous discharge, but mild   Skin:     General: Skin is warm and dry.      Findings: No rash.   Neurological:      Mental Status: She is alert and oriented to person, place, and time. Mental status is at baseline.   Psychiatric:         Mood and Affect: Mood normal.         Behavior: Behavior normal.         Thought Content: Thought content normal.       Fluids    Intake/Output Summary (Last 24 hours) at 1/14/2023 1123  Last data filed at 1/14/2023 0552  Gross per 24 hour   Intake 490.07 ml   Output 2375 ml   Net -1884.93 ml       Laboratory  Recent Labs      01/12/23  0430 01/13/23  0438 01/14/23  0250   WBC 10.8 8.8 9.4   RBC 2.67* 2.56* 2.60*   HEMOGLOBIN 8.1* 7.8* 7.9*   HEMATOCRIT 26.1* 25.3* 26.2*   MCV 97.8 98.8* 100.8*   MCH 30.3 30.5 30.4   MCHC 31.0* 30.8* 30.2*   RDW 64.2* 62.5* 63.7*   PLATELETCT 396 409 409   MPV 9.1 9.1 9.1     Recent Labs     01/12/23  0430 01/14/23  0250   SODIUM 138 140   POTASSIUM 3.8 3.2*   CHLORIDE 108 107   CO2 22 26   GLUCOSE 116* 108*   BUN 11 17   CREATININE 0.46* 0.55   CALCIUM 7.7* 7.6*                   Imaging  DX-CHEST-PORTABLE (1 VIEW)   Final Result      Small BILATERAL pleural effusions with overlying atelectasis or airspace disease      DX-FEMUR-2+ RIGHT   Final Result      Postoperative change from resection of the distal RIGHT femur with internal fixation.      DX-PORTABLE FLUORO > 1 HOUR   Final Result      Portable fluoroscopy utilized for 1 minute 42 seconds.         INTERPRETING LOCATION: [address] [place] [address], [facility]      DX-FEMUR-2+ RIGHT   Final Result      Portable intraoperative imaging with findings as described above.      CT-FOREIGN FILM CAT SCAN   Final Result      DX-CHEST-PORTABLE (1 VIEW)   Final Result         1. No acute cardiopulmonary abnormalities are identified.      CT-KNEE W/O PLUS RECONS RIGHT   Final Result         1. There is an open wound in the anterior aspect with small amount of gas.      2. Subacute significant displays comminuted fracture of the distal femur with multiple displaced fragments.      3. Significant fluid and heterotopic calcification seen in the lateral aspect of the distal femoral fracture with rarefaction of the bones. This could reflects subacute nature of the fracture with superimposed infection/osteomyelitis given the open    wound. Underlying lytic mass cannot be excluded. MRI with contrast may be helpful for further evaluation.      CT-CSPINE WITHOUT PLUS RECONS   Final Result         1. No acute fracture from C1 through T1 is visualized.         CT-HEAD W/O   Final  Result         1. No acute intracranial abnormality. No evidence of acute intracranial hemorrhage or mass lesion.                     EC-ECHOCARDIOGRAM COMPLETE W/ CONT    (Results Pending)        Assessment/Plan  * Septic shock (HCC)- (present on admission)  Assessment & Plan  Resolved  Remains on IV steroids, will do a slow taper, initiating today on 1/13, I placed orders for this change  Vitals are ok on 1/13        Acute respiratory failure with hypoxia (HCC)- (present on admission)  Assessment & Plan  Remains signfiicantly hypoxic on 1/14 with NO improvement, still requiring 5-6 L NC  Pro-bnp is elevated at 2200, I personally reviewed this value on 1/13  No prior, I ordered an ECHO on 1/13-awaiting study still  If ECHO is normal, will get CTA chest  Continue IV lasix 20 mg BID, excellent urine output of nearly 2 liters noted on 1/14  Monitor urine output and monitor electrolytes daily while on IV diuretics, which is high risk  Try to wean O2 as much as possible, no clear underlying pulmonary disease    Closed bicondylar fracture of right femur (HCC)- (present on admission)  Assessment & Plan  S/p ORIF  Ortho following  Pain is well controlled  Working on SNF referrals    Anemia  Assessment & Plan  Hb 7.8 on admission  Check iron studies, folate, B12 and TSH  Monitor CBC, transfuse for hemoglobin less than 7      Hypocalcemia- (present on admission)  Assessment & Plan  Correct for low albumin, is normal    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)  Assessment & Plan  Oxybutinin, not working well  Patient is retaining large amounts of urine, bladder scan >999 ML urine  Likely related to prior diagnosis of transverse myelitis  Placed holguin for severe urinary retention  Monitor urine output closely, especially given she is on IV lasix 20 mg BID, excellent urine output noted on 1/14    Mild protein-calorie malnutrition (HCC)- (present on admission)  Assessment & Plan  Nutrition consult  Supplements  E/o  subcutaneous fat loss and muscle wasting noted on exam    Microcytic anemia- (present on admission)  Assessment & Plan  Fe, %sat and TIBC low: unlikely Fe deficiency  ?chronic blood loss from wound or GI tract  Folic acid WNL  B12 high  TSH 6.3 FT4 WNL  Hb levels remains stable on 1/14  LDH is normal, I personally reviewed this lab on 1/14  Haptoglobin level is pending, I reviewed this lab on 1/14    Septic joint of left knee joint (HCC)- (present on admission)  Assessment & Plan  Clinically remains stable, WBC has normalized and remains afebrile  I personally reviewed the CBC on 1/14  No changes to current management  Wound cultures showing Amp sensitive EColi and Enterococcus  Reviewed with ID who recommend amp/sulbactam here in house with transition to Amox/Clav for a total of 6wks from I&D: stop date 2/20           VTE prophylaxis: enoxaparin ppx    I have performed a physical exam and reviewed and updated ROS and Plan today (1/14/2023). In review of yesterday's note (1/13/2023), there are no changes except as documented above.

## 2023-01-14 NOTE — PROGRESS NOTES
"   Orthopaedic Progress Note    Interval changes:  Patient doing well     Dressings CDI  Cleared for DC by ortho pending trauma clearance    ROS - Patient denies any new issues.  Pain well controlled.    /67   Pulse 72   Temp 36.4 °C (97.5 °F) (Temporal)   Resp 17   Ht 1.676 m (5' 5.98\")   Wt 70.2 kg (154 lb 12.2 oz)   SpO2 88%       Patient seen and examined  No acute distress  Breathing non labored  RRR  RLE dressings CDI, no change from prior neuro status, no issues.     Recent Labs     01/12/23  0430 01/13/23  0438 01/14/23  0250   WBC 10.8 8.8 9.4   RBC 2.67* 2.56* 2.60*   HEMOGLOBIN 8.1* 7.8* 7.9*   HEMATOCRIT 26.1* 25.3* 26.2*   MCV 97.8 98.8* 100.8*   MCH 30.3 30.5 30.4   MCHC 31.0* 30.8* 30.2*   RDW 64.2* 62.5* 63.7*   PLATELETCT 396 409 409   MPV 9.1 9.1 9.1       Active Hospital Problems    Diagnosis     Acute respiratory failure with hypoxia (HCC) [J96.01]     Closed bicondylar fracture of right femur (HCC) [S72.421A, S72.431A]     Septic shock (HCC) [A41.9, R65.21]     Septic joint of left knee joint (HCC) [M00.9]     Microcytic anemia [D50.9]     Mild protein-calorie malnutrition (HCC) [E44.1]     Neuropathic bladder [N31.9]     Hypocalcemia [E83.51]        Assessment/Plan:  Patient doing well     Dressings CDI  Cleared for DC by ortho pending trauma clearance  POD#4 S/P   1.  Wound debridement with multiple soft tissue and bone cultures.  2.  Retrograde intramedullary femoral fixation with cement supplementation  Wt bearing status - WBAT  Wound care/Drains - Dressings to be changed every other day by nursing  Future Procedures - none planned   Lovenox: Start 1/10, Duration-until ambulatory > 150'  Sutures/Staples out- 14 days post operatively  PT/OT-initiated  Antibiotics: unasyn 3g IV q8   DVT Prophylaxis- TEDS/SCDs/Foot pumps  Parker-none  Case Coordination for Discharge Planning - Disposition home   "

## 2023-01-15 ENCOUNTER — APPOINTMENT (OUTPATIENT)
Dept: RADIOLOGY | Facility: MEDICAL CENTER | Age: 73
DRG: 853 | End: 2023-01-15
Attending: INTERNAL MEDICINE
Payer: MEDICARE

## 2023-01-15 PROBLEM — E87.6 HYPOKALEMIA: Status: ACTIVE | Noted: 2023-01-15

## 2023-01-15 LAB
ANION GAP SERPL CALC-SCNC: 6 MMOL/L (ref 7–16)
BUN SERPL-MCNC: 19 MG/DL (ref 8–22)
CALCIUM SERPL-MCNC: 7.8 MG/DL (ref 8.5–10.5)
CHLORIDE SERPL-SCNC: 103 MMOL/L (ref 96–112)
CO2 SERPL-SCNC: 29 MMOL/L (ref 20–33)
CREAT SERPL-MCNC: 0.5 MG/DL (ref 0.5–1.4)
ERYTHROCYTE [DISTWIDTH] IN BLOOD BY AUTOMATED COUNT: 61.6 FL (ref 35.9–50)
GFR SERPLBLD CREATININE-BSD FMLA CKD-EPI: 100 ML/MIN/1.73 M 2
GLUCOSE SERPL-MCNC: 90 MG/DL (ref 65–99)
HCT VFR BLD AUTO: 26.2 % (ref 37–47)
HGB BLD-MCNC: 8 G/DL (ref 12–16)
MAGNESIUM SERPL-MCNC: 1.8 MG/DL (ref 1.5–2.5)
MCH RBC QN AUTO: 30.2 PG (ref 27–33)
MCHC RBC AUTO-ENTMCNC: 30.5 G/DL (ref 33.6–35)
MCV RBC AUTO: 98.9 FL (ref 81.4–97.8)
PLATELET # BLD AUTO: 389 K/UL (ref 164–446)
PMV BLD AUTO: 8.8 FL (ref 9–12.9)
POTASSIUM SERPL-SCNC: 3.2 MMOL/L (ref 3.6–5.5)
RBC # BLD AUTO: 2.65 M/UL (ref 4.2–5.4)
SODIUM SERPL-SCNC: 138 MMOL/L (ref 135–145)
WBC # BLD AUTO: 9.6 K/UL (ref 4.8–10.8)

## 2023-01-15 PROCEDURE — 80048 BASIC METABOLIC PNL TOTAL CA: CPT

## 2023-01-15 PROCEDURE — 700101 HCHG RX REV CODE 250: Performed by: INTERNAL MEDICINE

## 2023-01-15 PROCEDURE — 700102 HCHG RX REV CODE 250 W/ 637 OVERRIDE(OP): Performed by: INTERNAL MEDICINE

## 2023-01-15 PROCEDURE — 83735 ASSAY OF MAGNESIUM: CPT

## 2023-01-15 PROCEDURE — 99232 SBSQ HOSP IP/OBS MODERATE 35: CPT | Performed by: INTERNAL MEDICINE

## 2023-01-15 PROCEDURE — 71275 CT ANGIOGRAPHY CHEST: CPT

## 2023-01-15 PROCEDURE — 700102 HCHG RX REV CODE 250 W/ 637 OVERRIDE(OP): Performed by: STUDENT IN AN ORGANIZED HEALTH CARE EDUCATION/TRAINING PROGRAM

## 2023-01-15 PROCEDURE — 770001 HCHG ROOM/CARE - MED/SURG/GYN PRIV*

## 2023-01-15 PROCEDURE — A9270 NON-COVERED ITEM OR SERVICE: HCPCS | Performed by: STUDENT IN AN ORGANIZED HEALTH CARE EDUCATION/TRAINING PROGRAM

## 2023-01-15 PROCEDURE — 700111 HCHG RX REV CODE 636 W/ 250 OVERRIDE (IP): Performed by: INTERNAL MEDICINE

## 2023-01-15 PROCEDURE — 85027 COMPLETE CBC AUTOMATED: CPT

## 2023-01-15 PROCEDURE — 36415 COLL VENOUS BLD VENIPUNCTURE: CPT

## 2023-01-15 PROCEDURE — A9270 NON-COVERED ITEM OR SERVICE: HCPCS | Performed by: INTERNAL MEDICINE

## 2023-01-15 PROCEDURE — 700105 HCHG RX REV CODE 258: Performed by: INTERNAL MEDICINE

## 2023-01-15 PROCEDURE — 700117 HCHG RX CONTRAST REV CODE 255: Performed by: INTERNAL MEDICINE

## 2023-01-15 RX ORDER — POTASSIUM CHLORIDE 20 MEQ/1
40 TABLET, EXTENDED RELEASE ORAL 2 TIMES DAILY
Status: COMPLETED | OUTPATIENT
Start: 2023-01-15 | End: 2023-01-16

## 2023-01-15 RX ADMIN — HYDROCORTISONE SODIUM SUCCINATE 25 MG: 100 INJECTION, POWDER, FOR SOLUTION INTRAMUSCULAR; INTRAVENOUS at 19:05

## 2023-01-15 RX ADMIN — OXYCODONE HYDROCHLORIDE 5 MG: 5 TABLET ORAL at 19:11

## 2023-01-15 RX ADMIN — LEVOTHYROXINE SODIUM 50 MCG: 0.05 TABLET ORAL at 05:06

## 2023-01-15 RX ADMIN — POTASSIUM CHLORIDE 20 MEQ: 1500 TABLET, EXTENDED RELEASE ORAL at 05:06

## 2023-01-15 RX ADMIN — AMPICILLIN SODIUM AND SULBACTAM SODIUM 3 G: 2; 1 INJECTION, POWDER, FOR SOLUTION INTRAMUSCULAR; INTRAVENOUS at 19:04

## 2023-01-15 RX ADMIN — POTASSIUM CHLORIDE 40 MEQ: 1500 TABLET, EXTENDED RELEASE ORAL at 19:06

## 2023-01-15 RX ADMIN — AMPICILLIN SODIUM AND SULBACTAM SODIUM 3 G: 2; 1 INJECTION, POWDER, FOR SOLUTION INTRAMUSCULAR; INTRAVENOUS at 15:20

## 2023-01-15 RX ADMIN — AMPICILLIN SODIUM AND SULBACTAM SODIUM 3 G: 2; 1 INJECTION, POWDER, FOR SOLUTION INTRAMUSCULAR; INTRAVENOUS at 23:29

## 2023-01-15 RX ADMIN — AMPICILLIN SODIUM AND SULBACTAM SODIUM 3 G: 2; 1 INJECTION, POWDER, FOR SOLUTION INTRAMUSCULAR; INTRAVENOUS at 09:12

## 2023-01-15 RX ADMIN — THERA TABS 1 TABLET: TAB at 05:06

## 2023-01-15 RX ADMIN — OXYCODONE HYDROCHLORIDE 5 MG: 5 TABLET ORAL at 00:40

## 2023-01-15 RX ADMIN — SODIUM HYPOCHLORITE 1 ML: 1.25 SOLUTION TOPICAL at 07:34

## 2023-01-15 RX ADMIN — SODIUM HYPOCHLORITE 1 ML: 1.25 SOLUTION TOPICAL at 19:10

## 2023-01-15 RX ADMIN — ENOXAPARIN SODIUM 40 MG: 40 INJECTION SUBCUTANEOUS at 19:06

## 2023-01-15 RX ADMIN — DOCUSATE SODIUM 50 MG AND SENNOSIDES 8.6 MG 2 TABLET: 8.6; 5 TABLET, FILM COATED ORAL at 05:06

## 2023-01-15 RX ADMIN — OXYCODONE HYDROCHLORIDE 5 MG: 5 TABLET ORAL at 10:16

## 2023-01-15 RX ADMIN — DOCUSATE SODIUM 50 MG AND SENNOSIDES 8.6 MG 2 TABLET: 8.6; 5 TABLET, FILM COATED ORAL at 19:06

## 2023-01-15 RX ADMIN — OXYCODONE HYDROCHLORIDE 5 MG: 5 TABLET ORAL at 14:14

## 2023-01-15 RX ADMIN — OXYCODONE HYDROCHLORIDE 5 MG: 5 TABLET ORAL at 05:06

## 2023-01-15 RX ADMIN — CYANOCOBALAMIN TAB 500 MCG 500 MCG: 500 TAB at 05:07

## 2023-01-15 RX ADMIN — OXYCODONE HYDROCHLORIDE AND ACETAMINOPHEN 500 MG: 500 TABLET ORAL at 05:06

## 2023-01-15 RX ADMIN — OXYCODONE HYDROCHLORIDE 5 MG: 5 TABLET ORAL at 23:27

## 2023-01-15 RX ADMIN — OXYBUTYNIN CHLORIDE 10 MG: 10 TABLET, EXTENDED RELEASE ORAL at 05:06

## 2023-01-15 RX ADMIN — IOHEXOL 59 ML: 350 INJECTION, SOLUTION INTRAVENOUS at 15:30

## 2023-01-15 ASSESSMENT — ENCOUNTER SYMPTOMS
DOUBLE VISION: 0
BACK PAIN: 0
VOMITING: 0
ABDOMINAL PAIN: 0
DIZZINESS: 0
FOCAL WEAKNESS: 1
MYALGIAS: 1
SORE THROAT: 0
PALPITATIONS: 0
HEADACHES: 0
LOSS OF CONSCIOUSNESS: 0
FEVER: 0
BLURRED VISION: 0
COUGH: 0
NAUSEA: 0
DIARRHEA: 0
SHORTNESS OF BREATH: 0
SPUTUM PRODUCTION: 0

## 2023-01-15 ASSESSMENT — PAIN DESCRIPTION - PAIN TYPE
TYPE: ACUTE PAIN

## 2023-01-15 NOTE — PROGRESS NOTES
Bedside report received.  Assessment complete.  A&O x 4. Patient calls appropriately.  Patient is paraplegic x2 assist.  Patient has 0/10 pain. Declines interventions at this time.  Denies N&V. Tolerating diet.  Skin per flow sheets.  + void, via holguin in place with active order, + flatus  Patient denies SOB.  Patient pleasant with staff and sitting up in bed.  Review plan with of care with patient. Call light and personal belongings within reach. Hourly rounding in place. All needs met at this time.

## 2023-01-15 NOTE — CARE PLAN
The patient is Stable - Low risk of patient condition declining or worsening    Shift Goals  Clinical Goals: Pain control, Q2 turns, rest, pulmonary hyigene  Patient Goals: Pain control, rest  Family Goals: No family at the bedside    Problem: Knowledge Deficit - Standard  Goal: Patient and family/care givers will demonstrate understanding of plan of care, disease process/condition, diagnostic tests and medications  Outcome: Progressing     Problem: Skin Integrity  Goal: Skin integrity is maintained or improved  Outcome: Progressing     Problem: Pain - Standard  Goal: Alleviation of pain or a reduction in pain to the patient’s comfort goal  Outcome: Progressing     Progress made toward(s) clinical / shift goals:      Patient is not progressing towards the following goals:

## 2023-01-15 NOTE — PROGRESS NOTES
[facility] Medicine Daily Progress Note    Date of Service  1/15/2023    Chief Complaint  Dyana Conway is a 72 y.o. female admitted 1/8/2023 with knee pain    Hospital Course    71yo admitted 1/8.  PMHx paraplegia due to transversemyelitis, sacral decub, COPD, CHF, DM, glaucoma, CAD who Presented after a fall from her W/C with c/o knee pain.  Found to have a comminuted Fx of distal femur underlying a septic joint.  Underwent ORIF and I&D on 1/9 and then transferred to ICU due to post op hypotension.  Started on IV pressors.  Given one unit PRBCs and required stress dose steroids (cortisol 2)    Interval Problem Update  Septic arthritis-remains afebrile, pain is about 5/10, mostly tolerable. Tolerating current IV abx's without issue.  Sacral Decubitus ulcer stage IV-no changes.   Hypoxia-no improvements today. ECHO normal. CTA chest ordered.     I have discussed this patient's plan of care and discharge plan at IDT rounds today with Case Management, Nursing, Nursing leadership, and other members of the IDT team.    Consultants/Specialty  orthopedics    Code Status  Full Code    Disposition  Patient is not medically cleared for discharge.   Anticipate discharge to to an inpatient rehabilitation hospital. Acute rehab declined patient. SNF's are pending.   I have placed the appropriate orders for post-discharge needs.    Review of Systems  Review of Systems   Constitutional:  Negative for fever.   HENT:  Negative for nosebleeds and sore throat.    Eyes:  Negative for blurred vision and double vision.   Respiratory:  Negative for cough, sputum production and shortness of breath.    Cardiovascular:  Negative for chest pain, palpitations and leg swelling.   Gastrointestinal:  Negative for abdominal pain, diarrhea, nausea and vomiting.   Genitourinary:  Negative for dysuria and urgency.   Musculoskeletal:  Positive for joint pain and myalgias. Negative for back pain.        Overall pain levels remain mostly tolerable      Skin:  Negative for rash.   Neurological:  Positive for focal weakness (at her baseline). Negative for dizziness, loss of consciousness and headaches.   All other systems reviewed and are negative.     Physical Exam  Temp:  [36.5 °C (97.7 °F)-37.1 °C (98.8 °F)] 36.5 °C (97.7 °F)  Pulse:  [67-81] 74  Resp:  [16] 16  BP: ()/(59-69) 98/65  SpO2:  [94 %-100 %] 96 %    Physical Exam  Constitutional:       General: She is not in acute distress.     Appearance: Normal appearance. She is well-developed. She is not diaphoretic.      Comments: Speaking in full sentences   HENT:      Head: Normocephalic and atraumatic.      Nose:      Comments: NC in place  Neck:      Vascular: No JVD.   Cardiovascular:      Rate and Rhythm: Normal rate and regular rhythm.      Heart sounds: Murmur heard.   Pulmonary:      Effort: Pulmonary effort is normal. No respiratory distress.      Breath sounds: No stridor. Rales (Bibasilar) present. No wheezing.      Comments: No clear change in poor aeration at the bases  Abdominal:      Palpations: Abdomen is soft.      Tenderness: There is no abdominal tenderness. There is no guarding or rebound.   Musculoskeletal:         General: No tenderness.      Right lower leg: Edema present.      Left lower leg: Edema present.      Comments: R>L lower leg edema, 1+ pitting edema, small improvement noted today  Feet warm and pink, normal cap refill  R knee with post op dressings in place, C/D/I   Skin:     General: Skin is warm and dry.      Findings: No rash.   Neurological:      Mental Status: She is alert and oriented to person, place, and time. Mental status is at baseline.   Psychiatric:         Mood and Affect: Mood normal.         Behavior: Behavior normal.         Thought Content: Thought content normal.       Fluids    Intake/Output Summary (Last 24 hours) at 1/15/2023 1223  Last data filed at 1/15/2023 1102  Gross per 24 hour   Intake 740.33 ml   Output 2150 ml   Net -1409.67 ml        Laboratory  Recent Labs     01/13/23  0438 01/14/23  0250 01/15/23  0352   WBC 8.8 9.4 9.6   RBC 2.56* 2.60* 2.65*   HEMOGLOBIN 7.8* 7.9* 8.0*   HEMATOCRIT 25.3* 26.2* 26.2*   MCV 98.8* 100.8* 98.9*   MCH 30.5 30.4 30.2   MCHC 30.8* 30.2* 30.5*   RDW 62.5* 63.7* 61.6*   PLATELETCT 409 409 389   MPV 9.1 9.1 8.8*     Recent Labs     01/14/23  0250 01/15/23  0352   SODIUM 140 138   POTASSIUM 3.2* 3.2*   CHLORIDE 107 103   CO2 26 29   GLUCOSE 108* 90   BUN 17 19   CREATININE 0.55 0.50   CALCIUM 7.6* 7.8*                   Imaging  EC-ECHOCARDIOGRAM COMPLETE W/O CONT   Final Result      DX-CHEST-PORTABLE (1 VIEW)   Final Result      Small BILATERAL pleural effusions with overlying atelectasis or airspace disease      DX-FEMUR-2+ RIGHT   Final Result      Postoperative change from resection of the distal RIGHT femur with internal fixation.      DX-PORTABLE FLUORO > 1 HOUR   Final Result      Portable fluoroscopy utilized for 1 minute 42 seconds.         INTERPRETING LOCATION: [address] [place] [address], [facility]      DX-FEMUR-2+ RIGHT   Final Result      Portable intraoperative imaging with findings as described above.      CT-FOREIGN FILM CAT SCAN   Final Result      DX-CHEST-PORTABLE (1 VIEW)   Final Result         1. No acute cardiopulmonary abnormalities are identified.      CT-KNEE W/O PLUS RECONS RIGHT   Final Result         1. There is an open wound in the anterior aspect with small amount of gas.      2. Subacute significant displays comminuted fracture of the distal femur with multiple displaced fragments.      3. Significant fluid and heterotopic calcification seen in the lateral aspect of the distal femoral fracture with rarefaction of the bones. This could reflects subacute nature of the fracture with superimposed infection/osteomyelitis given the open    wound. Underlying lytic mass cannot be excluded. MRI with contrast may be helpful for further evaluation.      CT-CSPINE WITHOUT PLUS RECONS   Final Result          1. No acute fracture from C1 through T1 is visualized.         CT-HEAD W/O   Final Result         1. No acute intracranial abnormality. No evidence of acute intracranial hemorrhage or mass lesion.                     CT-CTA CHEST PULMONARY ARTERY W/ RECONS    (Results Pending)        Assessment/Plan  * Septic shock (HCC)- (present on admission)  Assessment & Plan  Resolved  Remains on IV steroids, will do a slow taper, initiating today on 1/13, I placed orders for this change  Vitals are ok on 1/13        Hypokalemia- (present on admission)  Assessment & Plan  Remains low, 3.2, I personally reviewed the BMP on 1/15  Increase replacement  I ordered a repeat BMP for 1/16  2/2 forced IV diuresis    Acute respiratory failure with hypoxia (HCC)- (present on admission)  Assessment & Plan  Remains signfiicantly hypoxic on 1/15 with NO improvement again, still requiring 5-6 L NC  Pro-bnp is elevated at 2200, I personally reviewed this value on 1/13  ECHO 1/14 normal, including normal RV  CTA chest ordered by me on 1/15  Continue IV lasix 20 mg BID, excellent urine output continues on 1/15  Monitor urine output and monitor electrolytes daily while on IV diuretics, which is high risk  Try to wean O2 as much as possible, no clear underlying pulmonary disease  Consider VBG or ABG to help calculate A-a gradient    Closed bicondylar fracture of right femur (HCC)- (present on admission)  Assessment & Plan  S/p ORIF  Ortho following  Pain is well controlled  Working on SNF referrals    Anemia  Assessment & Plan  Hb 7.8 on admission  Check iron studies, folate, B12 and TSH  Monitor CBC, transfuse for hemoglobin less than 7      Hypocalcemia- (present on admission)  Assessment & Plan  Correct for low albumin, is normal    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)  Assessment & Plan  Oxybutinin, not working well  Patient is retaining large amounts of urine, bladder scan >999 ML urine  Likely related to prior diagnosis of  transverse myelitis  Placed holguin for severe urinary retention  Monitor urine output closely, especially given she is on IV lasix 20 mg BID, excellent urine output noted on 1/15    Mild protein-calorie malnutrition (HCC)- (present on admission)  Assessment & Plan  Nutrition consult  Supplements  E/o subcutaneous fat loss and muscle wasting noted on exam    Microcytic anemia- (present on admission)  Assessment & Plan  Fe, %sat and TIBC low: unlikely Fe deficiency  ?chronic blood loss from wound or GI tract  Folic acid WNL  B12 high  TSH 6.3 FT4 WNL  Hb levels remains stable on 1/15, I personally reviewed CBC on 1/15  LDH is normal  Haptoglobin level is pending, I reviewed this lab on 1/15    Septic joint of left knee joint (HCC)- (present on admission)  Assessment & Plan  Clinically remains stable, WBC has normalized and remains afebrile  I personally reviewed the CBC on 1/15  No changes to current management as of 1/15  Wound cultures showing Amp sensitive EColi and Enterococcus  Reviewed with ID who recommend amp/sulbactam here in house with transition to Amox/Clav for a total of 6wks from I&D: stop date 2/20           VTE prophylaxis: enoxaparin ppx    I have performed a physical exam and reviewed and updated ROS and Plan today (1/15/2023). In review of yesterday's note (1/14/2023), there are no changes except as documented above.

## 2023-01-15 NOTE — PROGRESS NOTES
"   Orthopaedic Progress Note    Interval changes:  Patient doing well     Right hip dressing with trace serosanguinous noted    Cleared for DC by ortho pending trauma clearance    ROS - Patient denies any new issues.  Pain well controlled.    BP 95/54   Pulse 84   Temp 36.9 °C (98.4 °F) (Temporal)   Resp 17   Ht 1.676 m (5' 5.98\")   Wt 70.2 kg (154 lb 12.2 oz)   SpO2 92%       Patient seen and examined  No acute distress  Breathing non labored  RRR  RLE dressings with trace sanguinous, ace wrap reapplied, no change from prior neuro status, no issues.     Recent Labs     01/13/23  0438 01/14/23  0250 01/15/23  0352   WBC 8.8 9.4 9.6   RBC 2.56* 2.60* 2.65*   HEMOGLOBIN 7.8* 7.9* 8.0*   HEMATOCRIT 25.3* 26.2* 26.2*   MCV 98.8* 100.8* 98.9*   MCH 30.5 30.4 30.2   MCHC 30.8* 30.2* 30.5*   RDW 62.5* 63.7* 61.6*   PLATELETCT 409 409 389   MPV 9.1 9.1 8.8*       Active Hospital Problems    Diagnosis     Hypokalemia [E87.6]     Acute respiratory failure with hypoxia ([facility]) [J96.01]     Closed bicondylar fracture of right femur ([facility]) [S72.421A, S72.431A]     Septic shock ([facility]) [A41.9, R65.21]     Septic joint of left knee joint ([facility]) [M00.9]     Microcytic anemia [D50.9]     Mild protein-calorie malnutrition ([facility]) [E44.1]     Neuropathic bladder [N31.9]     Hypocalcemia [E83.51]        Assessment/Plan:  Patient doing well     Right hip dressing with trace serosanguinous noted  Cleared for DC by ortho pending trauma clearance  POD#6 S/P   1.  Wound debridement with multiple soft tissue and bone cultures.  2.  Retrograde intramedullary femoral fixation with cement supplementation  Wt bearing status - WBAT  Wound care/Drains - Dressings to be changed every other day by nursing  Future Procedures - none planned   Lovenox: Start 1/10, Duration-until ambulatory > 150'  Sutures/Staples out- 14 days post operatively  PT/OT-initiated  Antibiotics: unasyn 3g IV q8   DVT Prophylaxis- TEDS/SCDs/Foot pumps  Parker-none  Case " Coordination for Discharge Planning - Disposition home

## 2023-01-15 NOTE — PROGRESS NOTES
Received report from day shift RN. Assumed patient care.    Patient is A+O x4.  Tolerating regular diet. Denies N/V.  Denies chest pain or SOB.  Pain 5 on a 0-10 pain scale.   Parker in place, + urine output, - BM. Last BM 1/12.  Q2 turns in place and heels floated on pillow.  Dressing and ACE wrap to right knee.     Plan of care discussed, all questions answered. Educated on the importance of calling before getting OOB and pt verbalizes understanding. Educated regarding importance of oral care. Oral care kit at bedside. Call light is within reach, treaded slipper socks on, bed in lowest/ locked position, hourly rounding in place, all needs met at this time

## 2023-01-15 NOTE — CARE PLAN
The patient is Watcher - Medium risk of patient condition declining or worsening    Shift Goals  Clinical Goals: Decrease O2 demands, Pain Management  Patient Goals: Pain Management  Family Goals: No family at the bedside    Progress made toward(s) clinical / shift goals:  Patient still requiring high O2 supplementation, medicated per MAR

## 2023-01-16 ENCOUNTER — APPOINTMENT (OUTPATIENT)
Dept: RADIOLOGY | Facility: MEDICAL CENTER | Age: 73
DRG: 853 | End: 2023-01-16
Attending: INTERNAL MEDICINE
Payer: MEDICARE

## 2023-01-16 PROBLEM — J90 PLEURAL EFFUSION: Status: ACTIVE | Noted: 2023-01-16

## 2023-01-16 LAB
ANION GAP SERPL CALC-SCNC: 7 MMOL/L (ref 7–16)
BACTERIA SPEC ANAEROBE CULT: ABNORMAL
BUN SERPL-MCNC: 21 MG/DL (ref 8–22)
CALCIUM SERPL-MCNC: 7.9 MG/DL (ref 8.5–10.5)
CHLORIDE SERPL-SCNC: 106 MMOL/L (ref 96–112)
CO2 SERPL-SCNC: 26 MMOL/L (ref 20–33)
CREAT SERPL-MCNC: 0.63 MG/DL (ref 0.5–1.4)
ERYTHROCYTE [DISTWIDTH] IN BLOOD BY AUTOMATED COUNT: 61.1 FL (ref 35.9–50)
GFR SERPLBLD CREATININE-BSD FMLA CKD-EPI: 95 ML/MIN/1.73 M 2
GLUCOSE SERPL-MCNC: 109 MG/DL (ref 65–99)
HAPTOGLOB SERPL-MCNC: 201 MG/DL (ref 30–200)
HCT VFR BLD AUTO: 28.5 % (ref 37–47)
HGB BLD-MCNC: 8.8 G/DL (ref 12–16)
MAGNESIUM SERPL-MCNC: 1.9 MG/DL (ref 1.5–2.5)
MCH RBC QN AUTO: 30.3 PG (ref 27–33)
MCHC RBC AUTO-ENTMCNC: 30.9 G/DL (ref 33.6–35)
MCV RBC AUTO: 98.3 FL (ref 81.4–97.8)
PLATELET # BLD AUTO: 459 K/UL (ref 164–446)
PMV BLD AUTO: 9 FL (ref 9–12.9)
POTASSIUM SERPL-SCNC: 4.2 MMOL/L (ref 3.6–5.5)
RBC # BLD AUTO: 2.9 M/UL (ref 4.2–5.4)
SIGNIFICANT IND 70042: ABNORMAL
SIGNIFICANT IND 70042: ABNORMAL
SITE SITE: ABNORMAL
SITE SITE: ABNORMAL
SODIUM SERPL-SCNC: 139 MMOL/L (ref 135–145)
SOURCE SOURCE: ABNORMAL
SOURCE SOURCE: ABNORMAL
WBC # BLD AUTO: 12.7 K/UL (ref 4.8–10.8)

## 2023-01-16 PROCEDURE — 700105 HCHG RX REV CODE 258: Performed by: INTERNAL MEDICINE

## 2023-01-16 PROCEDURE — 83735 ASSAY OF MAGNESIUM: CPT

## 2023-01-16 PROCEDURE — 99232 SBSQ HOSP IP/OBS MODERATE 35: CPT | Performed by: INTERNAL MEDICINE

## 2023-01-16 PROCEDURE — 700102 HCHG RX REV CODE 250 W/ 637 OVERRIDE(OP): Performed by: STUDENT IN AN ORGANIZED HEALTH CARE EDUCATION/TRAINING PROGRAM

## 2023-01-16 PROCEDURE — A9270 NON-COVERED ITEM OR SERVICE: HCPCS | Performed by: STUDENT IN AN ORGANIZED HEALTH CARE EDUCATION/TRAINING PROGRAM

## 2023-01-16 PROCEDURE — 700102 HCHG RX REV CODE 250 W/ 637 OVERRIDE(OP): Performed by: INTERNAL MEDICINE

## 2023-01-16 PROCEDURE — 700111 HCHG RX REV CODE 636 W/ 250 OVERRIDE (IP): Performed by: INTERNAL MEDICINE

## 2023-01-16 PROCEDURE — 97535 SELF CARE MNGMENT TRAINING: CPT

## 2023-01-16 PROCEDURE — 36415 COLL VENOUS BLD VENIPUNCTURE: CPT

## 2023-01-16 PROCEDURE — 80048 BASIC METABOLIC PNL TOTAL CA: CPT

## 2023-01-16 PROCEDURE — 85027 COMPLETE CBC AUTOMATED: CPT

## 2023-01-16 PROCEDURE — 770001 HCHG ROOM/CARE - MED/SURG/GYN PRIV*

## 2023-01-16 PROCEDURE — A9270 NON-COVERED ITEM OR SERVICE: HCPCS | Performed by: INTERNAL MEDICINE

## 2023-01-16 PROCEDURE — 76604 US EXAM CHEST: CPT

## 2023-01-16 RX ADMIN — AMPICILLIN SODIUM AND SULBACTAM SODIUM 3 G: 2; 1 INJECTION, POWDER, FOR SOLUTION INTRAMUSCULAR; INTRAVENOUS at 23:36

## 2023-01-16 RX ADMIN — OXYCODONE HYDROCHLORIDE 5 MG: 5 TABLET ORAL at 13:13

## 2023-01-16 RX ADMIN — OXYCODONE HYDROCHLORIDE AND ACETAMINOPHEN 500 MG: 500 TABLET ORAL at 05:41

## 2023-01-16 RX ADMIN — POTASSIUM CHLORIDE 40 MEQ: 1500 TABLET, EXTENDED RELEASE ORAL at 17:34

## 2023-01-16 RX ADMIN — SODIUM HYPOCHLORITE 1 ML: 1.25 SOLUTION TOPICAL at 17:13

## 2023-01-16 RX ADMIN — AMPICILLIN SODIUM AND SULBACTAM SODIUM 3 G: 2; 1 INJECTION, POWDER, FOR SOLUTION INTRAMUSCULAR; INTRAVENOUS at 05:43

## 2023-01-16 RX ADMIN — SODIUM HYPOCHLORITE 1 ML: 1.25 SOLUTION TOPICAL at 05:41

## 2023-01-16 RX ADMIN — FUROSEMIDE 20 MG: 10 INJECTION, SOLUTION INTRAMUSCULAR; INTRAVENOUS at 17:11

## 2023-01-16 RX ADMIN — THERA TABS 1 TABLET: TAB at 05:40

## 2023-01-16 RX ADMIN — AMPICILLIN SODIUM AND SULBACTAM SODIUM 3 G: 2; 1 INJECTION, POWDER, FOR SOLUTION INTRAMUSCULAR; INTRAVENOUS at 13:10

## 2023-01-16 RX ADMIN — AMPICILLIN SODIUM AND SULBACTAM SODIUM 3 G: 2; 1 INJECTION, POWDER, FOR SOLUTION INTRAMUSCULAR; INTRAVENOUS at 17:35

## 2023-01-16 RX ADMIN — DOCUSATE SODIUM 50 MG AND SENNOSIDES 8.6 MG 2 TABLET: 8.6; 5 TABLET, FILM COATED ORAL at 05:41

## 2023-01-16 RX ADMIN — OXYCODONE HYDROCHLORIDE 5 MG: 5 TABLET ORAL at 17:34

## 2023-01-16 RX ADMIN — HYDROCORTISONE SODIUM SUCCINATE 25 MG: 100 INJECTION, POWDER, FOR SOLUTION INTRAMUSCULAR; INTRAVENOUS at 05:41

## 2023-01-16 RX ADMIN — CYANOCOBALAMIN TAB 500 MCG 500 MCG: 500 TAB at 05:41

## 2023-01-16 RX ADMIN — OXYBUTYNIN CHLORIDE 10 MG: 10 TABLET, EXTENDED RELEASE ORAL at 05:40

## 2023-01-16 RX ADMIN — DOCUSATE SODIUM 50 MG AND SENNOSIDES 8.6 MG 2 TABLET: 8.6; 5 TABLET, FILM COATED ORAL at 17:34

## 2023-01-16 RX ADMIN — LEVOTHYROXINE SODIUM 50 MCG: 0.05 TABLET ORAL at 05:41

## 2023-01-16 RX ADMIN — OXYCODONE HYDROCHLORIDE 5 MG: 5 TABLET ORAL at 23:36

## 2023-01-16 RX ADMIN — POTASSIUM CHLORIDE 40 MEQ: 1500 TABLET, EXTENDED RELEASE ORAL at 05:40

## 2023-01-16 RX ADMIN — OXYCODONE HYDROCHLORIDE 5 MG: 5 TABLET ORAL at 05:40

## 2023-01-16 ASSESSMENT — PAIN DESCRIPTION - PAIN TYPE
TYPE: ACUTE PAIN
TYPE: SURGICAL PAIN

## 2023-01-16 ASSESSMENT — ENCOUNTER SYMPTOMS
MYALGIAS: 1
LOSS OF CONSCIOUSNESS: 0
DIZZINESS: 0
PALPITATIONS: 0
ABDOMINAL PAIN: 0
SPUTUM PRODUCTION: 0
FOCAL WEAKNESS: 1
BACK PAIN: 0
BLURRED VISION: 0
FEVER: 0
COUGH: 0
SHORTNESS OF BREATH: 0
DOUBLE VISION: 0
HEADACHES: 0
DIARRHEA: 0
SORE THROAT: 0

## 2023-01-16 NOTE — ASSESSMENT & PLAN NOTE
stop Lasix  Thoracenteses deferred as right pleural effusion too small to be safely drained 1/17/2023

## 2023-01-16 NOTE — PROGRESS NOTES
Received report from day shift RN. Assumed patient care.    Patient is A+O x4.  Tolerating regular diet. Denies N/V.  Denies chest pain or SOB.  Pain 0 on a 0-10 pain scale.   Parker in place, + urine output, - BM. Last BM 1/12. Patient states that her normal routine at home is having a BM every 3-4 days.   Patient sitting up in bed, Q2 turns in place to promote skin integrity.   Patient educated and encouraged to use the IS 10 times per hour when away.      Plan of care discussed, all questions answered. Educated on the importance of calling before getting OOB and pt verbalizes understanding. Educated regarding importance of oral care. Oral care kit at bedside. Call light is within reach, treaded slipper socks on, bed in lowest/ locked position, hourly rounding in place, all needs met at this time

## 2023-01-16 NOTE — PROGRESS NOTES
Assumed care, aox4, H/hearing, 7L oximax, vitals WNL, tolerates diet. Continue to manage pain R leg.

## 2023-01-16 NOTE — CARE PLAN
The patient is Watcher - Medium risk of patient condition declining or worsening    Shift Goals  Clinical Goals: Aggressive pulmonary hygiene, decrease O2 demand, pain control, rest  Patient Goals: Pain control, rest  Family Goals: No family present    Problem: Knowledge Deficit - Standard  Goal: Patient and family/care givers will demonstrate understanding of plan of care, disease process/condition, diagnostic tests and medications  Outcome: Progressing     Problem: Skin Integrity  Goal: Skin integrity is maintained or improved  Outcome: Progressing     Problem: Pain - Standard  Goal: Alleviation of pain or a reduction in pain to the patient’s comfort goal  Outcome: Progressing     Progress made toward(s) clinical / shift goals:  Still requiring 6L NC, strong effort while using IS. Up to 1500 on the IS.     Patient is not progressing towards the following goals:

## 2023-01-16 NOTE — PROGRESS NOTES
4 Eyes Skin Assessment Completed    Head WDL  Ears Redness and Blanching  Nose WDL  Mouth WDL  Neck WDL  Breast/Chest WDL  Shoulder Blades Redness and Blanching  Spine WDL  (R) Arm/Elbow/Hand Bruising  (L) Arm/Elbow/Hand Bruising  Abdomen WDL  Groin Redness and Blanching  Scrotum/Coccyx/Buttocks Non-Blanching, Stage 4 Pressure injury, dressing changed per Order.   (R) Leg Swelling and Incision, Ace wrap to right knee, CDI.  (L) Leg Swelling  (R) Heel/Foot/Toe Redness and Blanching  (L) Heel/Foot/Toe Redness and Blanching    Devices In Places Pulse Ox, Parker, and Nasal Cannula    Interventions In Place NC W/Ear Foams, Heel Mepilex, Q2 Turns, Barrier Cream, and Pressure Redistribution Mattress    Possible Skin Injury Yes    Pictures Uploaded Into Epic Yes, already done  Wound Consult Placed Yes  RN Wound Prevention Protocol Ordered Yes

## 2023-01-16 NOTE — PROGRESS NOTES
Hospital Medicine Daily Progress Note    Date of Service  1/16/2023    Chief Complaint  Dyana Conway is a 72 y.o. female admitted 1/8/2023 with knee pain    Hospital Course    71yo admitted 1/8.  PMHx paraplegia due to transversemyelitis, sacral decub, COPD, CHF, DM, glaucoma, CAD who Presented after a fall from her W/C with c/o knee pain.  Found to have a comminuted Fx of distal femur underlying a septic joint.  Underwent ORIF and I&D on 1/9 and then transferred to ICU due to post op hypotension.  Started on IV pressors.  Given one unit PRBCs and required stress dose steroids (cortisol 2)    Interval Problem Update  Septic arthritis-afebrile, pain is decently controlled.   Sacral Decubitus ulcer stage IV-wound care in place  Hypoxia-no improvements today, remains at 6 L NC. See CTA below.     I have discussed this patient's plan of care and discharge plan at IDT rounds today with Case Management, Nursing, Nursing leadership, and other members of the IDT team.    Consultants/Specialty  orthopedics    Code Status  Full Code    Disposition  Patient is not medically cleared for discharge.   Anticipate discharge to to an inpatient rehabilitation hospital. Acute rehab declined patient. SNF's are pending.   I have placed the appropriate orders for post-discharge needs.    Review of Systems  Review of Systems   Constitutional:  Negative for fever.   HENT:  Negative for nosebleeds and sore throat.    Eyes:  Negative for blurred vision and double vision.   Respiratory:  Negative for cough, sputum production and shortness of breath.    Cardiovascular:  Negative for chest pain, palpitations and leg swelling.   Gastrointestinal:  Negative for abdominal pain and diarrhea.   Genitourinary:  Negative for dysuria and urgency.   Musculoskeletal:  Positive for joint pain and myalgias. Negative for back pain.        Pain around surgical site comes and goes     Skin:  Negative for rash.   Neurological:  Positive for focal  weakness (at her baseline). Negative for dizziness, loss of consciousness and headaches.   All other systems reviewed and are negative.     Physical Exam  Temp:  [36.7 °C (98.1 °F)-36.9 °C (98.4 °F)] 36.8 °C (98.2 °F)  Pulse:  [68-84] 75  Resp:  [15-18] 18  BP: ()/(57-61) 114/61  SpO2:  [89 %-94 %] 90 %    Physical Exam  Constitutional:       General: She is not in acute distress.     Appearance: Normal appearance. She is well-developed. She is not diaphoretic.      Comments: Speaking in full sentences   HENT:      Head: Normocephalic and atraumatic.      Nose:      Comments: NC in place  Neck:      Vascular: No JVD.   Cardiovascular:      Rate and Rhythm: Normal rate and regular rhythm.      Heart sounds: Murmur heard.   Pulmonary:      Effort: Pulmonary effort is normal. No respiratory distress.      Breath sounds: No stridor. Rales (Bibasilar) present. No wheezing.      Comments: Remains unchanged aeration  Abdominal:      Palpations: Abdomen is soft.      Tenderness: There is no abdominal tenderness. There is no guarding or rebound.   Musculoskeletal:         General: No tenderness.      Right lower leg: Edema present.      Left lower leg: Edema present.      Comments: R>L lower leg edema, trace pitting edema noted  Feet warm and pink, normal cap refill  R knee with post op dressings in place, C/D/I   Skin:     General: Skin is warm and dry.      Findings: No rash.   Neurological:      Mental Status: She is alert and oriented to person, place, and time. Mental status is at baseline.   Psychiatric:         Mood and Affect: Mood normal.         Behavior: Behavior normal.         Thought Content: Thought content normal.       Fluids    Intake/Output Summary (Last 24 hours) at 1/16/2023 1444  Last data filed at 1/16/2023 1140  Gross per 24 hour   Intake 695.94 ml   Output 1025 ml   Net -329.06 ml       Laboratory  Recent Labs     01/14/23  0250 01/15/23  0352 01/16/23  0205   WBC 9.4 9.6 12.7*   RBC 2.60*  2.65* 2.90*   HEMOGLOBIN 7.9* 8.0* 8.8*   HEMATOCRIT 26.2* 26.2* 28.5*   .8* 98.9* 98.3*   MCH 30.4 30.2 30.3   MCHC 30.2* 30.5* 30.9*   RDW 63.7* 61.6* 61.1*   PLATELETCT 409 389 459*   MPV 9.1 8.8* 9.0     Recent Labs     01/14/23  0250 01/15/23  0352 01/16/23  0205   SODIUM 140 138 139   POTASSIUM 3.2* 3.2* 4.2   CHLORIDE 107 103 106   CO2 26 29 26   GLUCOSE 108* 90 109*   BUN 17 19 21   CREATININE 0.55 0.50 0.63   CALCIUM 7.6* 7.8* 7.9*                   Imaging  CT-CTA CHEST PULMONARY ARTERY W/ RECONS   Final Result      1.  Negative for pulmonary embolism      2.  Moderate-sized right and small-moderate size left pleural effusion      3.  Bilateral atelectasis            EC-ECHOCARDIOGRAM COMPLETE W/O CONT   Final Result      DX-CHEST-PORTABLE (1 VIEW)   Final Result      Small BILATERAL pleural effusions with overlying atelectasis or airspace disease      DX-FEMUR-2+ RIGHT   Final Result      Postoperative change from resection of the distal RIGHT femur with internal fixation.      DX-PORTABLE FLUORO > 1 HOUR   Final Result      Portable fluoroscopy utilized for 1 minute 42 seconds.         INTERPRETING LOCATION: [address] [place] [address], [id number]      DX-FEMUR-2+ RIGHT   Final Result      Portable intraoperative imaging with findings as described above.      CT-FOREIGN FILM CAT SCAN   Final Result      DX-CHEST-PORTABLE (1 VIEW)   Final Result         1. No acute cardiopulmonary abnormalities are identified.      CT-KNEE W/O PLUS RECONS RIGHT   Final Result         1. There is an open wound in the anterior aspect with small amount of gas.      2. Subacute significant displays comminuted fracture of the distal femur with multiple displaced fragments.      3. Significant fluid and heterotopic calcification seen in the lateral aspect of the distal femoral fracture with rarefaction of the bones. This could reflects subacute nature of the fracture with superimposed infection/osteomyelitis given the open     wound. Underlying lytic mass cannot be excluded. MRI with contrast may be helpful for further evaluation.      CT-CSPINE WITHOUT PLUS RECONS   Final Result         1. No acute fracture from C1 through T1 is visualized.         CT-HEAD W/O   Final Result         1. No acute intracranial abnormality. No evidence of acute intracranial hemorrhage or mass lesion.                     US-THORACENTESIS PUNCTURE RIGHT    (Results Pending)        Assessment/Plan  * Septic shock (HCC)- (present on admission)  Assessment & Plan  Resolved  Remains on IV steroids, will do a slow taper, initiating today on 1/13, I placed orders for this change        Pleural effusion  Assessment & Plan  Unclear cause  Check AB u/s to r/o liver disease  ECHO normal  Thora R sided Ordered by me today  Continue IV lasix    Hypokalemia- (present on admission)  Assessment & Plan  Resolved  I personally reviewed the BMP on 1/16    Acute respiratory failure with hypoxia (HCC)- (present on admission)  Assessment & Plan  Remains signfiicantly hypoxic no improvements noted on 1/16  CTA chest 1/15, negative for PE, but shows bilateral pleural effusions R>L  Right sided thoracentesis ordered for 1/16  ECHO 1/14 normal, including normal RV  Continue IV lasix 20 mg BID, excellent urine output continues on 1/15  Monitor urine output and monitor electrolytes daily while on IV diuretics, which is high risk  Try to wean O2 as much as possible, no clear underlying pulmonary disease  Etiology is possibly related to aggressive IV fluids on admission or as noted elsewhere    Closed bicondylar fracture of right femur (HCC)- (present on admission)  Assessment & Plan  S/p ORIF  Ortho following  Pain is well controlled  Working on SNF referrals    Anemia  Assessment & Plan  Hb 7.8 on admission  Check iron studies, folate, B12 and TSH  Monitor CBC, transfuse for hemoglobin less than 7      Hypocalcemia- (present on admission)  Assessment & Plan  Correct for low albumin,  is normal    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)  Assessment & Plan  Oxybutinin, not working well  Patient is retaining large amounts of urine, bladder scan >999 ML urine  Likely related to prior diagnosis of transverse myelitis  Placed holguin for severe urinary retention  Monitor urine output closely, especially given she is on IV lasix 20 mg BID, continued excellent urine output noted    Mild protein-calorie malnutrition (HCC)- (present on admission)  Assessment & Plan  Nutrition consult  Supplements  E/o subcutaneous fat loss and muscle wasting noted on exam    Microcytic anemia- (present on admission)  Assessment & Plan  Fe, %sat and TIBC low: unlikely Fe deficiency  ?chronic blood loss from wound or GI tract  Folic acid WNL  B12 high  TSH 6.3 FT4 WNL  Hb levels remains stable on 1/16 I personally reviewed CBC on 1/16  LDH is normal  Haptoglobin level is barely elevated at 201, I personally reviewed this lab on 1/16    Septic joint of left knee joint (HCC)- (present on admission)  Assessment & Plan  Clinically remains stable, surgical site appears fine, afebrile, but WBC is rising even while tapering steroids  Need to monitor this closely  I personally reviewed the CBC on 1/16  Wound cultures showing Amp sensitive EColi and Enterococcus  Reviewed with ID who recommend amp/sulbactam here in house with transition to Amox/Clav for a total of 6wks from I&D: stop date 2/20           VTE prophylaxis: enoxaparin ppx    I have performed a physical exam and reviewed and updated ROS and Plan today (1/16/2023). In review of yesterday's note (1/15/2023), there are no changes except as documented above.

## 2023-01-16 NOTE — DISCHARGE PLANNING
Agency/Facility Name: [facility]   Outcome: Received a voicemail from [facility] stating that pt will be pending. Questions regarding discharge plan and wound care, especially if pt will need a vac. Also stated pt must be on 5L or less of O2 in order to accept.     @[id number]  Agency/Facility Name: [facility]   Spoke To: Terri   Outcome: Received call from facility stating that they are interested in taking the pt. DPA informed them that they are the first choice. Terri stated that she would call either this DPA or the CM in the morning to discuss further details. Terri's contact number is [phone]

## 2023-01-16 NOTE — PROGRESS NOTES
"   Orthopaedic Progress Note    Interval changes:  Patient doing well     RLE dressing CDI  Cleared for DC by ortho pending trauma clearance    ROS - Patient denies any new issues.  Pain well controlled.    BP 99/58   Pulse 78   Temp 36.8 °C (98.2 °F) (Temporal)   Resp 18   Ht 1.676 m (5' 5.98\")   Wt 70.2 kg (154 lb 12.2 oz)   SpO2 93%       Patient seen and examined  No acute distress  Breathing non labored  RRR  RLE dressings CDI, no change from prior neuro status, no issues.     Recent Labs     01/14/23  0250 01/15/23  0352 01/16/23  0205   WBC 9.4 9.6 12.7*   RBC 2.60* 2.65* 2.90*   HEMOGLOBIN 7.9* 8.0* 8.8*   HEMATOCRIT 26.2* 26.2* 28.5*   .8* 98.9* 98.3*   MCH 30.4 30.2 30.3   MCHC 30.2* 30.5* 30.9*   RDW 63.7* 61.6* 61.1*   PLATELETCT 409 389 459*   MPV 9.1 8.8* 9.0       Active Hospital Problems    Diagnosis     Pleural effusion [J90]     Hypokalemia [E87.6]     Acute respiratory failure with hypoxia ([facility]) [J96.01]     Closed bicondylar fracture of right femur ([facility]) [S72.421A, S72.431A]     Septic shock ([facility]) [A41.9, R65.21]     Septic joint of left knee joint ([facility]) [M00.9]     Microcytic anemia [D50.9]     Mild protein-calorie malnutrition ([facility]) [E44.1]     Neuropathic bladder [N31.9]     Hypocalcemia [E83.51]        Assessment/Plan:  Patient doing well     Dressings CDI  Cleared for DC by ortho pending trauma clearance  POD#7 S/P   1.  Wound debridement with multiple soft tissue and bone cultures.  2.  Retrograde intramedullary femoral fixation with cement supplementation  Wt bearing status - WBAT  Wound care/Drains - Dressings to be changed every other day by nursing  Future Procedures - none planned   Lovenox: Start 1/10, Duration-until ambulatory > 150'  Sutures/Staples out- 14 days post operatively  PT/OT-initiated  Antibiotics: unasyn 3g IV q8   DVT Prophylaxis- TEDS/SCDs/Foot pumps  Parker-none  Case Coordination for Discharge Planning - Disposition home   "

## 2023-01-17 ENCOUNTER — APPOINTMENT (OUTPATIENT)
Dept: RADIOLOGY | Facility: MEDICAL CENTER | Age: 73
DRG: 853 | End: 2023-01-17
Attending: INTERNAL MEDICINE
Payer: MEDICARE

## 2023-01-17 PROBLEM — G82.20 PARAPLEGIA (HCC): Status: ACTIVE | Noted: 2023-01-17
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
## 2023-01-17 PROBLEM — L89.90 DECUBITUS ULCER: Status: ACTIVE | Noted: 2023-01-17

## 2023-01-17 PROBLEM — S72.401B OPEN FRACTURE OF RIGHT DISTAL FEMUR (HCC): Status: ACTIVE | Noted: 2023-01-12

## 2023-01-17 PROBLEM — E03.9 HYPOTHYROID: Status: ACTIVE | Noted: 2023-01-17

## 2023-01-17 LAB
ANION GAP SERPL CALC-SCNC: 5 MMOL/L (ref 7–16)
BUN SERPL-MCNC: 16 MG/DL (ref 8–22)
CALCIUM SERPL-MCNC: 7.8 MG/DL (ref 8.5–10.5)
CHLORIDE SERPL-SCNC: 108 MMOL/L (ref 96–112)
CO2 SERPL-SCNC: 28 MMOL/L (ref 20–33)
CREAT SERPL-MCNC: 0.67 MG/DL (ref 0.5–1.4)
ERYTHROCYTE [DISTWIDTH] IN BLOOD BY AUTOMATED COUNT: 62.1 FL (ref 35.9–50)
GFR SERPLBLD CREATININE-BSD FMLA CKD-EPI: 93 ML/MIN/1.73 M 2
GLUCOSE SERPL-MCNC: 88 MG/DL (ref 65–99)
HCT VFR BLD AUTO: 29.1 % (ref 37–47)
HGB BLD-MCNC: 8.8 G/DL (ref 12–16)
MCH RBC QN AUTO: 30.1 PG (ref 27–33)
MCHC RBC AUTO-ENTMCNC: 30.2 G/DL (ref 33.6–35)
MCV RBC AUTO: 99.7 FL (ref 81.4–97.8)
PLATELET # BLD AUTO: 431 K/UL (ref 164–446)
PMV BLD AUTO: 8.9 FL (ref 9–12.9)
POTASSIUM SERPL-SCNC: 4.5 MMOL/L (ref 3.6–5.5)
RBC # BLD AUTO: 2.92 M/UL (ref 4.2–5.4)
SARS-COV+SARS-COV-2 AG RESP QL IA.RAPID: NOTDETECTED
SODIUM SERPL-SCNC: 141 MMOL/L (ref 135–145)
SPECIMEN SOURCE: NORMAL
WBC # BLD AUTO: 11 K/UL (ref 4.8–10.8)

## 2023-01-17 PROCEDURE — 700111 HCHG RX REV CODE 636 W/ 250 OVERRIDE (IP): Performed by: INTERNAL MEDICINE

## 2023-01-17 PROCEDURE — 36415 COLL VENOUS BLD VENIPUNCTURE: CPT

## 2023-01-17 PROCEDURE — 87426 SARSCOV CORONAVIRUS AG IA: CPT

## 2023-01-17 PROCEDURE — 97530 THERAPEUTIC ACTIVITIES: CPT

## 2023-01-17 PROCEDURE — A9270 NON-COVERED ITEM OR SERVICE: HCPCS | Performed by: STUDENT IN AN ORGANIZED HEALTH CARE EDUCATION/TRAINING PROGRAM

## 2023-01-17 PROCEDURE — 700102 HCHG RX REV CODE 250 W/ 637 OVERRIDE(OP): Performed by: STUDENT IN AN ORGANIZED HEALTH CARE EDUCATION/TRAINING PROGRAM

## 2023-01-17 PROCEDURE — A9270 NON-COVERED ITEM OR SERVICE: HCPCS | Performed by: INTERNAL MEDICINE

## 2023-01-17 PROCEDURE — 80048 BASIC METABOLIC PNL TOTAL CA: CPT

## 2023-01-17 PROCEDURE — 770001 HCHG ROOM/CARE - MED/SURG/GYN PRIV*

## 2023-01-17 PROCEDURE — 700102 HCHG RX REV CODE 250 W/ 637 OVERRIDE(OP): Performed by: INTERNAL MEDICINE

## 2023-01-17 PROCEDURE — 700105 HCHG RX REV CODE 258: Performed by: INTERNAL MEDICINE

## 2023-01-17 PROCEDURE — 99232 SBSQ HOSP IP/OBS MODERATE 35: CPT | Performed by: FAMILY MEDICINE

## 2023-01-17 PROCEDURE — 76700 US EXAM ABDOM COMPLETE: CPT

## 2023-01-17 PROCEDURE — 85027 COMPLETE CBC AUTOMATED: CPT

## 2023-01-17 RX ORDER — FUROSEMIDE 10 MG/ML
20 INJECTION INTRAMUSCULAR; INTRAVENOUS
Status: DISCONTINUED | OUTPATIENT
Start: 2023-01-18 | End: 2023-01-19

## 2023-01-17 RX ADMIN — CYANOCOBALAMIN TAB 500 MCG 500 MCG: 500 TAB at 06:33

## 2023-01-17 RX ADMIN — FUROSEMIDE 20 MG: 10 INJECTION, SOLUTION INTRAMUSCULAR; INTRAVENOUS at 06:35

## 2023-01-17 RX ADMIN — AMPICILLIN SODIUM AND SULBACTAM SODIUM 3 G: 2; 1 INJECTION, POWDER, FOR SOLUTION INTRAMUSCULAR; INTRAVENOUS at 07:04

## 2023-01-17 RX ADMIN — AMPICILLIN SODIUM AND SULBACTAM SODIUM 3 G: 2; 1 INJECTION, POWDER, FOR SOLUTION INTRAMUSCULAR; INTRAVENOUS at 18:47

## 2023-01-17 RX ADMIN — DOCUSATE SODIUM 50 MG AND SENNOSIDES 8.6 MG 2 TABLET: 8.6; 5 TABLET, FILM COATED ORAL at 18:45

## 2023-01-17 RX ADMIN — OXYCODONE HYDROCHLORIDE AND ACETAMINOPHEN 500 MG: 500 TABLET ORAL at 06:33

## 2023-01-17 RX ADMIN — AMPICILLIN SODIUM AND SULBACTAM SODIUM 3 G: 2; 1 INJECTION, POWDER, FOR SOLUTION INTRAMUSCULAR; INTRAVENOUS at 12:14

## 2023-01-17 RX ADMIN — ENOXAPARIN SODIUM 40 MG: 40 INJECTION SUBCUTANEOUS at 18:46

## 2023-01-17 RX ADMIN — OXYBUTYNIN CHLORIDE 10 MG: 10 TABLET, EXTENDED RELEASE ORAL at 06:33

## 2023-01-17 RX ADMIN — DOCUSATE SODIUM 50 MG AND SENNOSIDES 8.6 MG 2 TABLET: 8.6; 5 TABLET, FILM COATED ORAL at 06:33

## 2023-01-17 RX ADMIN — THERA TABS 1 TABLET: TAB at 06:33

## 2023-01-17 RX ADMIN — OXYCODONE HYDROCHLORIDE 5 MG: 5 TABLET ORAL at 06:33

## 2023-01-17 RX ADMIN — LEVOTHYROXINE SODIUM 50 MCG: 0.05 TABLET ORAL at 06:33

## 2023-01-17 RX ADMIN — OXYCODONE HYDROCHLORIDE 5 MG: 5 TABLET ORAL at 12:14

## 2023-01-17 RX ADMIN — HYDROCORTISONE SODIUM SUCCINATE 25 MG: 100 INJECTION, POWDER, FOR SOLUTION INTRAMUSCULAR; INTRAVENOUS at 06:34

## 2023-01-17 RX ADMIN — OXYCODONE HYDROCHLORIDE 5 MG: 5 TABLET ORAL at 21:05

## 2023-01-17 RX ADMIN — MAGNESIUM HYDROXIDE 30 ML: 400 SUSPENSION ORAL at 06:38

## 2023-01-17 RX ADMIN — OXYBUTYNIN CHLORIDE 10 MG: 10 TABLET, EXTENDED RELEASE ORAL at 18:45

## 2023-01-17 RX ADMIN — SODIUM HYPOCHLORITE 10 ML: 1.25 SOLUTION TOPICAL at 06:40

## 2023-01-17 RX ADMIN — AMPICILLIN SODIUM AND SULBACTAM SODIUM 3 G: 2; 1 INJECTION, POWDER, FOR SOLUTION INTRAMUSCULAR; INTRAVENOUS at 23:51

## 2023-01-17 RX ADMIN — SODIUM HYPOCHLORITE 20 ML: 1.25 SOLUTION TOPICAL at 18:47

## 2023-01-17 RX ADMIN — POLYETHYLENE GLYCOL 3350 1 PACKET: 17 POWDER, FOR SOLUTION ORAL at 06:38

## 2023-01-17 ASSESSMENT — ENCOUNTER SYMPTOMS
NECK PAIN: 0
MYALGIAS: 0
NERVOUS/ANXIOUS: 0
SENSORY CHANGE: 0
PALPITATIONS: 0
DIAPHORESIS: 0
DIARRHEA: 0
CHILLS: 0
BACK PAIN: 0
FLANK PAIN: 0
WHEEZING: 0
SORE THROAT: 0
HEARTBURN: 0
FEVER: 0
WEAKNESS: 1
VOMITING: 0
NAUSEA: 0
HEADACHES: 0
SHORTNESS OF BREATH: 0
BLURRED VISION: 0
FOCAL WEAKNESS: 1
COUGH: 0
SPEECH CHANGE: 0
ABDOMINAL PAIN: 0
DIZZINESS: 0

## 2023-01-17 ASSESSMENT — COGNITIVE AND FUNCTIONAL STATUS - GENERAL
MOVING TO AND FROM BED TO CHAIR: UNABLE
CLIMB 3 TO 5 STEPS WITH RAILING: TOTAL
SUGGESTED CMS G CODE MODIFIER MOBILITY: CM
STANDING UP FROM CHAIR USING ARMS: TOTAL
TURNING FROM BACK TO SIDE WHILE IN FLAT BAD: A LOT
MOBILITY SCORE: 7
MOVING FROM LYING ON BACK TO SITTING ON SIDE OF FLAT BED: UNABLE
WALKING IN HOSPITAL ROOM: TOTAL

## 2023-01-17 ASSESSMENT — PAIN DESCRIPTION - PAIN TYPE: TYPE: SURGICAL PAIN

## 2023-01-17 NOTE — PROGRESS NOTES
[facility] Medicine Daily Progress Note    Date of Service  1/17/2023    Chief Complaint  Dyana Conway is a 72 y.o. female admitted 1/8/2023 with open distal femur fracture    Hospital Course  Admitted with right open distal femur fracture after a fall.  Patient with known history of paraplegia secondary to history of transverse myelitis.  She was started on empiric coverage with IV vancomycin and Rocephin.  Surgery was consulted on the case.  Patient underwent Wound debridement with multiple soft tissue and bone cultures, Retrograde intramedullary femoral fixation with cement supplementation on 1/9/2023.  Postoperatively she had hypotension and required transfer to the ICU.  He was placed on IV steroids.  Wound cultures showed E. coli, Enterococcus, and Finegoldia.  Antibiotics were changed to IV Unasyn.  She was noted to have acute respiratory failure with hypoxia, also noted to have bilateral pleural effusions and was started on IV Lasix for diuresis.    Interval Problem Update  Femur fracture -pain controlled, cultures showed Enterococcus, Finegoldia, E. Coli  Resp failure - O2 2 lpm  Pleural effusion -too small to drain    I have discussed this patient's plan of care and discharge plan at IDT rounds today with Case Management, Nursing, Nursing leadership, and other members of the IDT team.    Consultants/Specialty  critical care and orthopedics    Code Status  Full Code    Disposition  Patient is medically cleared for discharge.   Anticipate discharge to to skilled nursing facility.  I have placed the appropriate orders for post-discharge needs.    Review of Systems  Review of Systems   Constitutional:  Positive for malaise/fatigue. Negative for chills, diaphoresis and fever.   HENT:  Positive for hearing loss. Negative for congestion and sore throat.    Eyes:  Negative for blurred vision.   Respiratory:  Negative for cough, shortness of breath and wheezing.    Cardiovascular:  Negative for chest pain,  palpitations and leg swelling.   Gastrointestinal:  Negative for abdominal pain, diarrhea, heartburn, nausea and vomiting.   Genitourinary:  Negative for dysuria, flank pain and hematuria.   Musculoskeletal:  Positive for joint pain. Negative for back pain, myalgias and neck pain.   Skin:  Negative for rash.   Neurological:  Positive for focal weakness and weakness. Negative for dizziness, sensory change, speech change and headaches.   Psychiatric/Behavioral:  The patient is not nervous/anxious.       Physical Exam  Temp:  [36.4 °C (97.5 °F)-37.1 °C (98.8 °F)] 36.4 °C (97.5 °F)  Pulse:  [63-82] 69  Resp:  [16-18] 17  BP: ()/(56-67) 109/63  SpO2:  [93 %-94 %] 94 %    Physical Exam  Vitals and nursing note reviewed.   HENT:      Head: Normocephalic and atraumatic.      Nose: No congestion.      Mouth/Throat:      Mouth: Mucous membranes are moist.   Eyes:      Extraocular Movements: Extraocular movements intact.      Conjunctiva/sclera: Conjunctivae normal.   Cardiovascular:      Rate and Rhythm: Normal rate and regular rhythm.   Pulmonary:      Effort: Pulmonary effort is normal.      Breath sounds: Decreased air movement present. Examination of the right-lower field reveals decreased breath sounds. Examination of the left-lower field reveals decreased breath sounds. Decreased breath sounds present.   Abdominal:      General: There is no distension.      Tenderness: There is no abdominal tenderness. There is no guarding or rebound.   Musculoskeletal:      Cervical back: No tenderness.      Right lower leg: Edema present.      Left lower leg: Edema present.   Skin:     Comments: Decubitus ulcer at right ischial tuberosity area   Neurological:      Mental Status: She is alert and oriented to person, place, and time.      Cranial Nerves: No cranial nerve deficit.      Motor: Weakness (MMT BUE 5/5, BLE 0-1/5) present.       Fluids    Intake/Output Summary (Last 24 hours) at 1/17/2023 2825  Last data filed at  1/17/2023 1155  Gross per 24 hour   Intake 282.58 ml   Output 3525 ml   Net -3242.42 ml       Laboratory  Recent Labs     01/15/23  0352 01/16/23  0205 01/17/23  0237   WBC 9.6 12.7* 11.0*   RBC 2.65* 2.90* 2.92*   HEMOGLOBIN 8.0* 8.8* 8.8*   HEMATOCRIT 26.2* 28.5* 29.1*   MCV 98.9* 98.3* 99.7*   MCH 30.2 30.3 30.1   MCHC 30.5* 30.9* 30.2*   RDW 61.6* 61.1* 62.1*   PLATELETCT 389 459* 431   MPV 8.8* 9.0 8.9*     Recent Labs     01/15/23  0352 01/16/23  0205 01/17/23  0237   SODIUM 138 139 141   POTASSIUM 3.2* 4.2 4.5   CHLORIDE 103 106 108   CO2 29 26 28   GLUCOSE 90 109* 88   BUN 19 21 16   CREATININE 0.50 0.63 0.67   CALCIUM 7.8* 7.9* 7.8*                   Imaging  US-ABDOMEN COMPLETE SURVEY   Final Result         1.  Atherosclerosis   2.  Bilateral pleural effusion   3.  Trace perihepatic ascites      US-CHEST   Final Result      1.  Small right pleural effusion identified too small to safely be aspirated.      2.  Thoracentesis deferred.      CT-CTA CHEST PULMONARY ARTERY W/ RECONS   Final Result      1.  Negative for pulmonary embolism      2.  Moderate-sized right and small-moderate size left pleural effusion      3.  Bilateral atelectasis            EC-ECHOCARDIOGRAM COMPLETE W/O CONT   Final Result      DX-CHEST-PORTABLE (1 VIEW)   Final Result      Small BILATERAL pleural effusions with overlying atelectasis or airspace disease      DX-FEMUR-2+ RIGHT   Final Result      Postoperative change from resection of the distal RIGHT femur with internal fixation.      DX-PORTABLE FLUORO > 1 HOUR   Final Result      Portable fluoroscopy utilized for 1 minute 42 seconds.         INTERPRETING LOCATION: [address] [place] [address], [address]      DX-FEMUR-2+ RIGHT   Final Result      Portable intraoperative imaging with findings as described above.      CT-FOREIGN FILM CAT SCAN   Final Result      DX-CHEST-PORTABLE (1 VIEW)   Final Result         1. No acute cardiopulmonary abnormalities are identified.      CT-KNEE W/O PLUS  RECONS RIGHT   Final Result         1. There is an open wound in the anterior aspect with small amount of gas.      2. Subacute significant displays comminuted fracture of the distal femur with multiple displaced fragments.      3. Significant fluid and heterotopic calcification seen in the lateral aspect of the distal femoral fracture with rarefaction of the bones. This could reflects subacute nature of the fracture with superimposed infection/osteomyelitis given the open    wound. Underlying lytic mass cannot be excluded. MRI with contrast may be helpful for further evaluation.      CT-CSPINE WITHOUT PLUS RECONS   Final Result         1. No acute fracture from C1 through T1 is visualized.         CT-HEAD W/O   Final Result         1. No acute intracranial abnormality. No evidence of acute intracranial hemorrhage or mass lesion.                          Assessment/Plan  * Open fracture of right distal femur (HCC)- (present on admission)  Assessment & Plan  Wound debridement with multiple soft tissue and bone cultures, Retrograde intramedullary femoral fixation with cement supplementation.   1/9/2023  Pain control, wound care  WBAT RLE  IV Unasyn  IR recommendation - may use Augmentin on discharge with end date 2/20/2023    Septic shock (HCC)- (present on admission)  Assessment & Plan  tapering dose of steroids        Hypothyroid- (present on admission)  Assessment & Plan  Synthroid  Repeat TSH on 2/9/2023    Fall- (present on admission)  Assessment & Plan  PT and OT    Paraplegia (HCC)- (present on admission)  Assessment & Plan  Secondary to transverse myelitis  PT and OT    Decubitus ulcer- (present on admission)  Assessment & Plan  Wound care    Pleural effusion  Assessment & Plan  IV Lasix  Thoracenteses deferred as right pleural effusion too small to be safely drained 1/17/2023    Hypokalemia- (present on admission)  Assessment & Plan  Follow BMP    Acute respiratory failure with hypoxia (HCC)- (present on  admission)  Assessment & Plan  Encourage I-S, RT protocol  IV Lasix    Anemia- (present on admission)  Assessment & Plan  Follow cbc      Neurogenic bladder- (present on admission)  Assessment & Plan  Parker catheter in place for urinary retention    Mild protein-calorie malnutrition (HCC)- (present on admission)  Assessment & Plan  Nutrition consult  Supplements         VTE prophylaxis: enoxaparin ppx    I have performed a physical exam and reviewed and updated ROS and Plan today (1/17/2023). In review of yesterday's note (1/16/2023), there are no changes except as documented above.

## 2023-01-17 NOTE — DISCHARGE PLANNING
Care Transition Team Discharge Planning    Anticipated Discharge Information  Discharge Disposition: D/T to SNF with Medicare cert in anticipation of skilled care (03)              Discharge Plan:  Patient has a completed PASRR. Number is [id number]

## 2023-01-17 NOTE — CARE PLAN
The patient is Stable - Low risk of patient condition declining or worsening    Shift Goals  Clinical Goals: pain control, IS  Patient Goals: pain control  Family Goals: No family present    Progress made toward(s) clinical / shift goals:    Problem: Knowledge Deficit - Standard  Goal: Patient and family/care givers will demonstrate understanding of plan of care, disease process/condition, diagnostic tests and medications  Outcome: Progressing     Problem: Skin Integrity  Goal: Skin integrity is maintained or improved  Outcome: Progressing     Problem: Pain - Standard  Goal: Alleviation of pain or a reduction in pain to the patient’s comfort goal  Outcome: Progressing       Patient is not progressing towards the following goals:  Encourage movement, and continue to manage pain.

## 2023-01-17 NOTE — PROGRESS NOTES
Pt is NPO for US abdomen. Dressing changed. Small amounts of stool clean. Very small. Continue to manage pain

## 2023-01-17 NOTE — DISCHARGE PLANNING
Care Transition Team Discharge Planning    Anticipated Discharge Information  Discharge Disposition: D/T to SNF with Medicare cert in anticipation of skilled care (03)          CM spoke with intake at [facility] and she states that patient is accepted. Messaged nurse to let her know that when the patient is medically cleared she must have a rapid covid test and information is needed regarding patient's wound vac. CM will continue to follow.     Discharge Plan:  CM called [facility] at [phone]. Call will not go through. CM will call later to follow up.

## 2023-01-17 NOTE — DISCHARGE PLANNING
Care Transition Team Assessment    Information Source  Orientation Level: Oriented X4  Information Given By: Patient  Who is responsible for making decisions for patient? : Patient         Elopement Risk  Legal Hold: No  Ambulatory or Self Mobile in Wheelchair: No-Not an Elopement Risk  Elopement Risk: Not at Risk for Elopement    Interdisciplinary Discharge Planning  Lives with - Patient's Self Care Capacity: Spouse  Patient or legal guardian wants to designate a caregiver: No  Support Systems: Spouse / Significant Other  Housing / Facility: Mobile Home  Prior Services: Continuous (24 Hour) Care Giving Family  Durable Medical Equipment: Walker    Discharge Preparedness  What is your plan after discharge?: Skilled nursing facility  What are your discharge supports?: Spouse         Finances  Financial Barriers to Discharge: No  Prescription Coverage: Yes    Vision / Hearing Impairment  Vision Impairment : No  Hearing Impairment : No  Hearing Impairment: Both Ears, Other (Comments) (hard of hearing)              Domestic Abuse  Have you ever been the victim of abuse or violence?: No  Physical Abuse or Sexual Abuse: No  Verbal Abuse or Emotional Abuse: No  Possible Abuse/Neglect Reported to:: Not Applicable    Psychological Assessment  History of Substance Abuse: None  History of Psychiatric Problems: No  Non-compliant with Treatment: No    Discharge Risks or Barriers  Discharge risks or barriers?: No    Anticipated Discharge Information  Discharge Disposition: D/T to SNF with Medicare cert in anticipation of skilled care (03)

## 2023-01-18 PROCEDURE — A9270 NON-COVERED ITEM OR SERVICE: HCPCS | Performed by: INTERNAL MEDICINE

## 2023-01-18 PROCEDURE — 700111 HCHG RX REV CODE 636 W/ 250 OVERRIDE (IP): Performed by: FAMILY MEDICINE

## 2023-01-18 PROCEDURE — 700102 HCHG RX REV CODE 250 W/ 637 OVERRIDE(OP): Performed by: INTERNAL MEDICINE

## 2023-01-18 PROCEDURE — 700111 HCHG RX REV CODE 636 W/ 250 OVERRIDE (IP): Performed by: INTERNAL MEDICINE

## 2023-01-18 PROCEDURE — A9270 NON-COVERED ITEM OR SERVICE: HCPCS | Performed by: STUDENT IN AN ORGANIZED HEALTH CARE EDUCATION/TRAINING PROGRAM

## 2023-01-18 PROCEDURE — 700102 HCHG RX REV CODE 250 W/ 637 OVERRIDE(OP): Performed by: STUDENT IN AN ORGANIZED HEALTH CARE EDUCATION/TRAINING PROGRAM

## 2023-01-18 PROCEDURE — 700101 HCHG RX REV CODE 250: Performed by: INTERNAL MEDICINE

## 2023-01-18 PROCEDURE — 700105 HCHG RX REV CODE 258: Performed by: INTERNAL MEDICINE

## 2023-01-18 PROCEDURE — 99232 SBSQ HOSP IP/OBS MODERATE 35: CPT | Performed by: FAMILY MEDICINE

## 2023-01-18 PROCEDURE — 770001 HCHG ROOM/CARE - MED/SURG/GYN PRIV*

## 2023-01-18 PROCEDURE — 97602 WOUND(S) CARE NON-SELECTIVE: CPT

## 2023-01-18 RX ADMIN — OXYCODONE HYDROCHLORIDE 5 MG: 5 TABLET ORAL at 11:59

## 2023-01-18 RX ADMIN — AMPICILLIN SODIUM AND SULBACTAM SODIUM 3 G: 2; 1 INJECTION, POWDER, FOR SOLUTION INTRAMUSCULAR; INTRAVENOUS at 23:33

## 2023-01-18 RX ADMIN — AMPICILLIN SODIUM AND SULBACTAM SODIUM 3 G: 2; 1 INJECTION, POWDER, FOR SOLUTION INTRAMUSCULAR; INTRAVENOUS at 18:57

## 2023-01-18 RX ADMIN — SODIUM HYPOCHLORITE 20 ML: 1.25 SOLUTION TOPICAL at 14:30

## 2023-01-18 RX ADMIN — OXYCODONE HYDROCHLORIDE 5 MG: 5 TABLET ORAL at 23:32

## 2023-01-18 RX ADMIN — AMPICILLIN SODIUM AND SULBACTAM SODIUM 3 G: 2; 1 INJECTION, POWDER, FOR SOLUTION INTRAMUSCULAR; INTRAVENOUS at 04:30

## 2023-01-18 RX ADMIN — SODIUM HYPOCHLORITE 30 ML: 1.25 SOLUTION TOPICAL at 04:34

## 2023-01-18 RX ADMIN — LEVOTHYROXINE SODIUM 50 MCG: 0.05 TABLET ORAL at 04:29

## 2023-01-18 RX ADMIN — HYDROCORTISONE SODIUM SUCCINATE 25 MG: 100 INJECTION, POWDER, FOR SOLUTION INTRAMUSCULAR; INTRAVENOUS at 04:32

## 2023-01-18 RX ADMIN — CYANOCOBALAMIN TAB 500 MCG 500 MCG: 500 TAB at 04:29

## 2023-01-18 RX ADMIN — FUROSEMIDE 20 MG: 10 INJECTION, SOLUTION INTRAMUSCULAR; INTRAVENOUS at 06:00

## 2023-01-18 RX ADMIN — AMPICILLIN SODIUM AND SULBACTAM SODIUM 3 G: 2; 1 INJECTION, POWDER, FOR SOLUTION INTRAMUSCULAR; INTRAVENOUS at 12:03

## 2023-01-18 RX ADMIN — ENOXAPARIN SODIUM 40 MG: 40 INJECTION SUBCUTANEOUS at 18:52

## 2023-01-18 RX ADMIN — OXYBUTYNIN CHLORIDE 10 MG: 10 TABLET, EXTENDED RELEASE ORAL at 04:28

## 2023-01-18 RX ADMIN — OXYCODONE HYDROCHLORIDE AND ACETAMINOPHEN 500 MG: 500 TABLET ORAL at 04:29

## 2023-01-18 RX ADMIN — OXYCODONE HYDROCHLORIDE 5 MG: 5 TABLET ORAL at 01:34

## 2023-01-18 RX ADMIN — OXYCODONE HYDROCHLORIDE 5 MG: 5 TABLET ORAL at 18:53

## 2023-01-18 RX ADMIN — OXYBUTYNIN CHLORIDE 10 MG: 10 TABLET, EXTENDED RELEASE ORAL at 18:52

## 2023-01-18 RX ADMIN — OXYCODONE HYDROCHLORIDE 5 MG: 5 TABLET ORAL at 06:00

## 2023-01-18 RX ADMIN — THERA TABS 1 TABLET: TAB at 04:28

## 2023-01-18 ASSESSMENT — ENCOUNTER SYMPTOMS
BACK PAIN: 0
FOCAL WEAKNESS: 1
NECK PAIN: 0
WHEEZING: 0
NAUSEA: 0
MYALGIAS: 0
BLURRED VISION: 0
DIZZINESS: 0
NERVOUS/ANXIOUS: 0
WEAKNESS: 1
DIARRHEA: 0
COUGH: 0
SORE THROAT: 0
VOMITING: 0
SHORTNESS OF BREATH: 0
PALPITATIONS: 0
CHILLS: 0
FEVER: 0
SPEECH CHANGE: 0
FLANK PAIN: 0
SENSORY CHANGE: 0
ABDOMINAL PAIN: 0
HEARTBURN: 0
DIAPHORESIS: 0
HEADACHES: 0

## 2023-01-18 ASSESSMENT — PAIN DESCRIPTION - PAIN TYPE
TYPE: ACUTE PAIN;SURGICAL PAIN

## 2023-01-18 NOTE — PROGRESS NOTES
[facility] Medicine Daily Progress Note    Date of Service  1/18/2023    Chief Complaint  Dyana Conway is a 72 y.o. female admitted 1/8/2023 with open distal femur fracture    Hospital Course  Admitted with Right Open distal femur fracture after a fall.  Patient with known history of paraplegia secondary to history of transverse myelitis.  She was started on empiric coverage with IV Vancomycin and Rocephin.  Orthopedic Surgery was consulted on the case.  Patient underwent Wound debridement with multiple soft tissue and bone cultures, Retrograde intramedullary femoral fixation with cement supplementation on 1/9/2023.  Postoperatively she had hypotension and required transfer to the ICU. She was placed on IV steroids.  Wound cultures showed E. coli, Enterococcus, and Finegoldia.  Antibiotics were changed to IV Unasyn.  She was noted to have Acute respiratory failure with hypoxia, also noted to have bilateral pleural effusions and was started on IV Lasix for diuresis.  Ultrasound guided thoracentesis was ordered, but it was too small to be drained safely.    Interval Problem Update  Femur fracture -pain controlled, cultures showed Enterococcus, Finegoldia, E. Coli  Resp failure - O2 2 lpm  Pleural effusion - too small to drain    I have discussed this patient's plan of care and discharge plan at IDT rounds today with Case Management, Nursing, Nursing leadership, and other members of the IDT team.    Consultants/Specialty  critical care and orthopedics    Code Status  Full Code    Disposition  Patient is medically cleared for discharge.   Anticipate discharge to to skilled nursing facility.  I have placed the appropriate orders for post-discharge needs.    Review of Systems  Review of Systems   Constitutional:  Positive for malaise/fatigue. Negative for chills, diaphoresis and fever.   HENT:  Positive for hearing loss. Negative for congestion and sore throat.    Eyes:  Negative for blurred vision.   Respiratory:   Negative for cough, shortness of breath and wheezing.    Cardiovascular:  Negative for chest pain, palpitations and leg swelling.   Gastrointestinal:  Negative for abdominal pain, diarrhea, heartburn, nausea and vomiting.   Genitourinary:  Negative for dysuria, flank pain and hematuria.   Musculoskeletal:  Positive for joint pain. Negative for back pain, myalgias and neck pain.   Skin:  Negative for rash.   Neurological:  Positive for focal weakness and weakness. Negative for dizziness, sensory change, speech change and headaches.   Psychiatric/Behavioral:  The patient is not nervous/anxious.       Physical Exam  Temp:  [36.5 °C (97.7 °F)-37.1 °C (98.8 °F)] 37 °C (98.6 °F)  Pulse:  [69-78] 71  Resp:  [16-18] 17  BP: ()/(50-65) 97/50  SpO2:  [92 %-95 %] 95 %    Physical Exam  Vitals and nursing note reviewed.   HENT:      Head: Normocephalic and atraumatic.      Nose: No congestion.      Mouth/Throat:      Mouth: Mucous membranes are moist.   Eyes:      Extraocular Movements: Extraocular movements intact.      Conjunctiva/sclera: Conjunctivae normal.   Cardiovascular:      Rate and Rhythm: Normal rate and regular rhythm.   Pulmonary:      Effort: Pulmonary effort is normal.      Breath sounds: Decreased air movement present. Examination of the right-lower field reveals decreased breath sounds. Examination of the left-lower field reveals decreased breath sounds. Decreased breath sounds present.   Abdominal:      General: There is no distension.      Tenderness: There is no abdominal tenderness. There is no guarding or rebound.   Musculoskeletal:      Cervical back: No tenderness.      Right lower leg: Edema present.      Left lower leg: Edema present.   Skin:     Comments: Decubitus ulcer at right ischial tuberosity area   Neurological:      Mental Status: She is alert and oriented to person, place, and time.      Cranial Nerves: No cranial nerve deficit.      Motor: Weakness (MMT BUE 5/5, BLE 0-1/5) present.        Fluids    Intake/Output Summary (Last 24 hours) at 1/18/2023 1530  Last data filed at 1/18/2023 1125  Gross per 24 hour   Intake 440 ml   Output 1750 ml   Net -1310 ml       Laboratory  Recent Labs     01/16/23  0205 01/17/23  0237   WBC 12.7* 11.0*   RBC 2.90* 2.92*   HEMOGLOBIN 8.8* 8.8*   HEMATOCRIT 28.5* 29.1*   MCV 98.3* 99.7*   MCH 30.3 30.1   MCHC 30.9* 30.2*   RDW 61.1* 62.1*   PLATELETCT 459* 431   MPV 9.0 8.9*     Recent Labs     01/16/23  0205 01/17/23 0237   SODIUM 139 141   POTASSIUM 4.2 4.5   CHLORIDE 106 108   CO2 26 28   GLUCOSE 109* 88   BUN 21 16   CREATININE 0.63 0.67   CALCIUM 7.9* 7.8*                   Imaging  US-ABDOMEN COMPLETE SURVEY   Final Result         1.  Atherosclerosis   2.  Bilateral pleural effusion   3.  Trace perihepatic ascites      US-CHEST   Final Result      1.  Small right pleural effusion identified too small to safely be aspirated.      2.  Thoracentesis deferred.      CT-CTA CHEST PULMONARY ARTERY W/ RECONS   Final Result      1.  Negative for pulmonary embolism      2.  Moderate-sized right and small-moderate size left pleural effusion      3.  Bilateral atelectasis            EC-ECHOCARDIOGRAM COMPLETE W/O CONT   Final Result      DX-CHEST-PORTABLE (1 VIEW)   Final Result      Small BILATERAL pleural effusions with overlying atelectasis or airspace disease      DX-FEMUR-2+ RIGHT   Final Result      Postoperative change from resection of the distal RIGHT femur with internal fixation.      DX-PORTABLE FLUORO > 1 HOUR   Final Result      Portable fluoroscopy utilized for 1 minute 42 seconds.         INTERPRETING LOCATION: [address] [place] [address], [id number]      DX-FEMUR-2+ RIGHT   Final Result      Portable intraoperative imaging with findings as described above.      CT-FOREIGN FILM CAT SCAN   Final Result      DX-CHEST-PORTABLE (1 VIEW)   Final Result         1. No acute cardiopulmonary abnormalities are identified.      CT-KNEE W/O PLUS RECONS RIGHT   Final Result          1. There is an open wound in the anterior aspect with small amount of gas.      2. Subacute significant displays comminuted fracture of the distal femur with multiple displaced fragments.      3. Significant fluid and heterotopic calcification seen in the lateral aspect of the distal femoral fracture with rarefaction of the bones. This could reflects subacute nature of the fracture with superimposed infection/osteomyelitis given the open    wound. Underlying lytic mass cannot be excluded. MRI with contrast may be helpful for further evaluation.      CT-CSPINE WITHOUT PLUS RECONS   Final Result         1. No acute fracture from C1 through T1 is visualized.         CT-HEAD W/O   Final Result         1. No acute intracranial abnormality. No evidence of acute intracranial hemorrhage or mass lesion.                          Assessment/Plan  * Open fracture of right distal femur (HCC)- (present on admission)  Assessment & Plan  Wound debridement with multiple soft tissue and bone cultures, Retrograde intramedullary femoral fixation with cement supplementation.   1/9/2023  Pain control, wound care  WBAT RLE  IV Unasyn  IR recommendation - may use Augmentin on discharge with end date 2/20/2023    Septic shock (HCC)- (present on admission)  Assessment & Plan  off steroids    Hypothyroid- (present on admission)  Assessment & Plan  Synthroid  Repeat TSH on 2/9/2023    Fall- (present on admission)  Assessment & Plan  PT and OT    Paraplegia (HCC)- (present on admission)  Assessment & Plan  Secondary to transverse myelitis  PT and OT    Decubitus ulcer- (present on admission)  Assessment & Plan  Wound care    Pleural effusion  Assessment & Plan  IV Lasix  Thoracenteses deferred as right pleural effusion too small to be safely drained 1/17/2023    Hypokalemia- (present on admission)  Assessment & Plan  Follow BMP    Acute respiratory failure with hypoxia (HCC)- (present on admission)  Assessment & Plan  Encourage I-S, RT  protocol  IV Lasix    Anemia- (present on admission)  Assessment & Plan  Follow cbc      Neurogenic bladder- (present on admission)  Assessment & Plan  Parker catheter in place for urinary retention    Mild protein-calorie malnutrition (HCC)- (present on admission)  Assessment & Plan  Nutrition consult  Supplements         VTE prophylaxis: enoxaparin ppx    I have performed a physical exam and reviewed and updated ROS and Plan today (1/18/2023). In review of yesterday's note (1/17/2023), there are no changes except as documented above.

## 2023-01-18 NOTE — WOUND TEAM
[facility]  Inpatient Services  Initial Wound and Skin Care Evaluation    Admission Date: 1/8/2023     Last order of IP CONSULT TO WOUND CARE was found on 1/9/2023 from Hospital Encounter on 1/8/2023     HPI, PMH, SH: Reviewed    Past Surgical History:   Procedure Laterality Date    IRRIGATION & DEBRIDEMENT GENERAL Right 1/9/2023    Procedure: IRRIGATION AND DEBRIDEMENT, WOUND;  Surgeon: Handy Chapa M.D.;  Location: SURGERY [facility];  Service: Orthopedics    ORIF, FRACTURE, FEMUR Right 1/9/2023    Procedure: INTRAMEDULLARY NAIL FIXATION OF RIGHT FEMUR  WITH CEMENT SUPPLEMENTATION;  Surgeon: Handy Chapa M.D.;  Location: SURGERY [facility];  Service: Orthopedics    ABDOMINAL EXPLORATION       Social History     Tobacco Use    Smoking status: Heavy Smoker     Packs/day: 0.50     Years: 15.00     Pack years: 7.50     Types: Cigarettes    Smokeless tobacco: Not on file   Substance Use Topics    Alcohol use: No     Chief Complaint   Patient presents with    Trauma Green     Transfer from [facility]     Diagnosis: Sepsis (HCC) [A41.9]    Unit where seen by Wound Team: S111/00     WOUND CONSULT/FOLLOW UP RELATED TO:  Right I.T. 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 Pt has a right I.T. wound which she states she has had for a significant amount of time. Pt fell out of her wheelchair on 12/22/22 and has since had a swollen knee since. On 1/8/23 pt noticed fluid draining from a wound to her knee and therefore contacted EMS. Pt was taken to OR for I&D of wound and repair of femur fracture. Wound team was consulted to manage Right I.T. wound.     01/18/23:  spoke with patient regarding plastics consult or surgical debridement to remove undermined area and rolled edges.  Patient declined to have any consult stating she heals very slowly and she doesn't want to create more wounds.  Patient also noted to have large amount  of drainage to R knee incision.      WOUND ASSESSMENT/LDA      Wound 01/09/23 Incision Knee Right staples, xeroform, 4x4, soft roll, ace wrap, 7 incisions (Active)   Wound Image    01/18/23 1200   Site Assessment Pink;Red    Periwound Assessment Edema    Margins Attached edges    Closure Secondary intention    Drainage Amount Copious    Drainage Description Serosanguineous;Sanguineous    Treatments Cleansed;Irrigation;Site care    Wound Cleansing Dakin's Solution    Periwound Protectant Skin Protectant Wipes to Periwound    Dressing Cleansing/Solutions 1/4 Strength Dakin's Solution    Dressing Options Plain Strip Packing;Absorbent Abdominal Pad;Dry Roll Gauze    Dressing Changed New    Dressing Status Clean;Dry;Intact    Dressing Change/Treatment Frequency Every Shift, and As Needed    NEXT Dressing Change/Treatment Date 01/18/23    Non-staged Wound Description Full thickness    Wound Length (cm) 0.2 cm    Wound Width (cm) 0.2 cm    Wound Surface Area (cm^2) 0.04 cm^2    Tunneling (cm) 7 cm    Tunneling Clock Position of Wound 12    WOUND NURSE ONLY - Time Spent with Patient (mins) 30        Wound 01/09/23 Pressure Injury Ischium Right stage 4 POA (Active)   Wound Image     01/18/23 1200   Site Assessment Pink;Red    Periwound Assessment Intact;Maceration    Margins Epibole (rolled edges)    Closure None    Drainage Amount Scant    Drainage Description Serosanguineous    Treatments Cleansed;Offloading;Site care    Wound Cleansing Dakin's Solution    Periwound Protectant Skin Protectant Wipes to Periwound    Dressing Cleansing/Solutions 1/4 Strength Dakin's Solution    Dressing Options Moist Roll Gauze;Mepilex    Dressing Changed Changed    Dressing Status Clean;Dry;Intact    Dressing Change/Treatment Frequency Every Shift, and As Needed    NEXT Dressing Change/Treatment Date 01/18/23    NEXT Weekly Photo (Inpatient Only) 01/25/23    WOUND NURSE ONLY - Pressure Injury Stage 4    Wound Length (cm) 3 cm    Wound Width  (cm) 2.5 cm    Wound Depth (cm) 2.2 cm    Wound Surface Area (cm^2) 7.5 cm^2    Wound Volume (cm^3) 16.5 cm^3    Wound Healing % 72    Undermining (cm) 4.6 cm    Undermining of Wound, 1st Location From 12 o'clock;To 1 o'clock    WOUND NURSE ONLY - Time Spent with Patient (mins) 30      Vascular:    FLAVIO:   No results found.    Lab Values:    Lab Results   Component Value Date/Time    WBC 11.0 (H) 01/17/2023 02:37 AM    RBC 2.92 (L) 01/17/2023 02:37 AM    HEMOGLOBIN 8.8 (L) 01/17/2023 02:37 AM    HEMATOCRIT 29.1 (L) 01/17/2023 02:37 AM      Culture Results show:  Recent Results (from the past 720 hour(s))   CULTURE WOUND W/ GRAM STAIN    Collection Time: 01/09/23 11:35 AM    Specimen: Wound   Result Value Ref Range    Significant Indicator POS (POS)     Source WND     Site DISTAL FEMUR #3     Culture Result - (A)     Gram Stain Result Few WBCs.  No organisms seen.       Culture Result (A)      Escherichia coli  Rare growth  See previous culture for sensitivity report.      Culture Result (A)      Enterococcus faecalis  Rare growth  See previous culture for sensitivity report.         Pain Level/Medicated:  Denies pain       INTERVENTIONS BY WOUND TEAM:  Chart and images reviewed. Discussed with bedside RN. All areas of concern (based on picture review, LDA review and discussion with bedside RN) have been thoroughly assessed. Documentation of areas based on significant findings. This RN in to assess patient. Performed standard wound care which includes appropriate positioning, dressing removal and non-selective debridement. Pictures and measurements obtained weekly if/when required.  Sacrum:   Preparation for Dressing removal: Dressing removed without difficulty  Non-selectively Debrided with:  Wound cleanser and gauze.  Sharp debridement: NA  Adrianna wound: Cleansed with wound cleanser, Prepped with barrier paste  Primary Dressing: Dakins moistened roll gauze  Secondary (Outer) Dressing: mepilex    Right lateral knee  "incision   Preparation for Dressing removal: Dressing removed without difficulty  Non-selectively Debrided with:  Dakin's and gauze   Sharp debridement: NA  Adrianna wound: Cleansed with dakins   Primary Dressing: Dakins moistened 1/4\" plain strip packing   Secondary (Outer) Dressing: ABD pad and roll gauze     Bilateral heels intact, some bruising noted to the left heel. Heel mepilex applied    Interdisciplinary consultation: Patient, Bedside RN, José Miguel KABA (Wound RN)    EVALUATION / RATIONALE FOR TREATMENT:  Most Recent Date:  01/18/23:  patient declined to have surgical consult.  Dakins applied to chemically debride nonviable tissue, decrease bioburden and odor.    01/10/23: Pt with open wound to Right I.T., pt has had the wound for significant amount of time. Would benefit from surgical debridement to open wound edges and allow better access to wound depth for potential vac placement. Discussed with bedside RN.     Goals: Steady decrease in wound area and depth weekly.    WOUND TEAM PLAN OF CARE ([X] for frequency of wound follow up,):   Nursing to follow dressing orders written for wound care. Contact wound team if area fails to progress, deteriorates or with any questions/concerns if something comes up before next scheduled follow up (See below as to whether wound is following and frequency of wound follow up)  Dressing changes by wound team:                   Follow up 3 times weekly:                NPWT change 3 times weekly:     Follow up 1-2 times weekly:    X  Follow up Bi-Monthly:           Follow up Monthly (High Risk):                        Follow up as needed:     Other (explain):     NURSING PLAN OF CARE ORDERS (X):  Dressing changes: See Dressing Care orders: X  Skin care: See Skin Care orders:   RN Prevention Protocol:   Rectal tube care: See Rectal Tube Care orders:   Other orders:    RSKIN:   CURRENTLY IN PLACE (X), APPLIED THIS VISIT (A), ORDERED (O):   Q shift Greg:  X  Q shift pressure point " assessments:  X    Surface/Positioning   Pressure redistribution mattress            Low Airloss          ICU Low Airloss X  Bariatric NATI     Waffle cushion        Waffle Overlay       X   Reposition q 2 hours  X    TAPs Turning system   X  Z Jorge Pillow     Offloading/Redistribution   Sacral Mepilex (Silicone dressing)   X  Heel Mepilex (Silicone dressing)       A  Heel float boots (Prevalon boot)             Float Heels off Bed with Pillows           Respiratory   Silicone O2 tubing     X    Gray Foam Ear protectors   X  Cannula fixation Device (Tender )          High flow offloading Clip    Elastic head band offloading device      Anchorfast                                                         Trach with Optifoam split foam             Containment/Moisture Prevention     Rectal tube or BMS    Purwick/Condom Cath        Parker Catheter  X  Barrier wipes           Barrier paste       Antifungal tx      Interdry        Mobilization       Up to chair        Ambulate      PT/OT      Nutrition       Dietician        Diabetes Education      PO   X  TF     TPN     NPO   # days     Other        Anticipated discharge plans:   LTACH:        SNF/Rehab:                  Home Health Care:    X       Outpatient Wound Center:            Self/Family Care:        Other:                  Vac Discharge Needs:   Not Applicable Pt not on a wound vac:     X  Regular Vac while inpatient, alternative dressing at DC:        Regular Vac in use and continued at DC:            Reg. Vac w/ Skin Sub/Biologic in use. Will need to be changed 2x wkly:      Veraflo Vac while inpatient, ok to transition to Regular Vac on Discharge:           Veraflo Vac while inpatient, will need to remain on Veraflo Vac upon discharge:

## 2023-01-18 NOTE — THERAPY
"Physical Therapy   Daily Treatment     Patient Name: Dyana Conway  Age:  72 y.o., Sex:  female  Medical Record #: [id number]  Today's Date: 1/17/2023     Precautions  Precautions: Fall Risk;Weight Bearing As Tolerated Right Lower Extremity    Assessment    Pt received in bed and agreeable to PT session. Pt required mod A for sup<>sit and was able to maintain sitting without supervision or loss of balance. Given that pt had a fall from her wheelchair and also has a sacral ulcer, discussed her wheelchair at home. Pt reports having a standard wheelchair with a foam cushion and a lightweight transport chair for when she leaves the home. Pt would benefit significantly from a custom wheelchair and seating assessment given her risk for pressure injuries and currently having one. Pt educated on need to ask at the rehab place or ask her primary care for a referral for this as it will maximize her independence and safety to have a custom chair. Will continue to follow for acute PT to progress as able.    Plan    Physical Therapy Treatment Plan  Physical Therapy Treatment Plan: Continue Current Treatment Plan    DC Equipment Recommendations: Unable to determine at this time  Discharge Recommendations: Recommend post-acute placement for additional physical therapy services prior to discharge home      Subjective    \"I just have a regular wheelchair for in the house and a transport one for when we leave, but I can't push myself as well with that one\"     Objective       01/17/23 1531   Precautions   Precautions Fall Risk;Weight Bearing As Tolerated Right Lower Extremity   Pain 0 - 10 Group   Therapist Pain Assessment Post Activity Pain Same as Prior to Activity;Nurse Notified;5   Cognition    Level of Consciousness Alert   Comments Pleasant and cooperative. Receptive to edu   Balance   Sitting Balance (Static) Fair   Sitting Balance (Dynamic) Fair   Weight Shift Sitting Fair   Skilled Intervention Tactile Cuing;Verbal Cuing "   Comments Able to scoot with BUE support and assist at LE, sits without UE support   Bed Mobility    Supine to Sit Minimal Assist   Sit to Supine Moderate Assist   Scooting Contact Guard Assist   Skilled Intervention Verbal Cuing;Sequencing   Comments Pt requested BLE be moved together for sup<>sit while pt managed her trunk. Edu on importance of clearance due to wound on ischial tuberosity, likely related to prolonged sitting with poor seat cushion.   Functional Mobility   Comments Pt declined transfer attempt this session. Pt's WC not present.   How much difficulty does the patient currently have...   Turning over in bed (including adjusting bedclothes, sheets and blankets)? 2   Sitting down on and standing up from a chair with arms (e.g., wheelchair, bedside commode, etc.) 1   Moving from lying on back to sitting on the side of the bed? 1   How much help from another person does the patient currently need...   Moving to and from a bed to a chair (including a wheelchair)? 1   Need to walk in a hospital room? 1   Climbing 3-5 steps with a railing? 1   6 clicks Mobility Score 7   Short Term Goals    Short Term Goal # 1 Pt will perform supine <> sit without bed features with SPV in 6 visits to progress bed mobility   Goal Outcome # 1 goal not met   Short Term Goal # 2 Pt will perform squat pivot transfer to WC with min A in 6 visits to progress OOB mobility   Goal Outcome # 2 Goal not met   Short Term Goal # 3 Pt will propel  ft with SPV in 6 visits to progress functional independence   Goal Outcome # 3 Goal not met   Education Group   Additional Comments Edu on wheelchair and cushion types. Pt reports having a standard wheelchair and a lightweight wheelchair with only a foam cushion. This likely contributes to her pressure ulcer.   Physical Therapy Treatment Plan   Physical Therapy Treatment Plan Continue Current Treatment Plan   Anticipated Discharge Equipment and Recommendations   DC Equipment  Recommendations Unable to determine at this time   Discharge Recommendations Recommend post-acute placement for additional physical therapy services prior to discharge home   Interdisciplinary Plan of Care Collaboration   IDT Collaboration with  Nursing   Patient Position at End of Therapy In Bed;Call Light within Reach;Tray Table within Reach;Phone within Reach   Collaboration Comments RN updated

## 2023-01-18 NOTE — PROGRESS NOTES
"   Orthopaedic Progress Note    Interval changes:  Patient doing well     RLE dressing CDI  Cleared for DC by ortho pending trauma clearance    ROS - Patient denies any new issues.  Pain well controlled.    /63   Pulse 69   Temp 36.4 °C (97.5 °F) (Temporal)   Resp 17   Ht 1.676 m (5' 5.98\")   Wt 70.2 kg (154 lb 12.2 oz)   SpO2 94%       Patient seen and examined  No acute distress  Breathing non labored  RRR  RLE dressings CDI, no change from prior neuro status, no issues.     Recent Labs     01/15/23  0352 01/16/23  0205 01/17/23  0237   WBC 9.6 12.7* 11.0*   RBC 2.65* 2.90* 2.92*   HEMOGLOBIN 8.0* 8.8* 8.8*   HEMATOCRIT 26.2* 28.5* 29.1*   MCV 98.9* 98.3* 99.7*   MCH 30.2 30.3 30.1   MCHC 30.5* 30.9* 30.2*   RDW 61.6* 61.1* 62.1*   PLATELETCT 389 459* 431   MPV 8.8* 9.0 8.9*       Active Hospital Problems    Diagnosis     Decubitus ulcer [L89.90]     Paraplegia ([facility]) [G82.20]     Fall [W19.XXXA]     Hypothyroid [E03.9]     Pleural effusion [J90]     Hypokalemia [E87.6]     Acute respiratory failure with hypoxia ([facility]) [J96.01]     Open fracture of right distal femur ([facility]) [S72.401B]     Anemia [D64.9]     Septic shock ([facility]) [A41.9, R65.21]     Mild protein-calorie malnutrition ([facility]) [E44.1]     Neurogenic bladder [N31.9]        Assessment/Plan:  Patient doing well     Dressings CDI  Cleared for DC by ortho pending trauma clearance  POD#8 S/P   1.  Wound debridement with multiple soft tissue and bone cultures.  2.  Retrograde intramedullary femoral fixation with cement supplementation  Wt bearing status - WBAT  Wound care/Drains - Dressings to be changed every other day by nursing  Future Procedures - none planned   Lovenox: Start 1/10, Duration-until ambulatory > 150'  Sutures/Staples out- 14 days post operatively  PT/OT-initiated  Antibiotics: unasyn 3g IV q8   DVT Prophylaxis- TEDS/SCDs/Foot pumps  Parker-none  Case Coordination for Discharge Planning - Disposition home   "

## 2023-01-18 NOTE — CARE PLAN
The patient is Stable - Low risk of patient condition declining or worsening    Shift Goals  Clinical Goals: skin care; pain control  Patient Goals: pain control  Family Goals: No family present    Progress made toward(s) clinical / shift goals:    Understands plan of care; wound care, and measures for pain management   Problem: Knowledge Deficit - Standard  Goal: Patient and family/care givers will demonstrate understanding of plan of care, disease process/condition, diagnostic tests and medications  Outcome: Progressing     Problem: Pain - Standard  Goal: Alleviation of pain or a reduction in pain to the patient’s comfort goal  Outcome: Progressing

## 2023-01-18 NOTE — DISCHARGE PLANNING
Care Transition Team Discharge Planning    Anticipated Discharge Information  Discharge Disposition: D/T to SNF with Medicare cert in anticipation of skilled care (03)              Discharge Plan:  CM placed call to [facility] for time that they are requesting patient is to be transferred to their facility. There was no answer. CM left a message and will follow up.

## 2023-01-18 NOTE — DISCHARGE PLANNING
Care Transition Team Discharge Planning    Anticipated Discharge Information  Discharge Disposition: D/T to SNF with Medicare cert in anticipation of skilled care (03)    CM spoke with Ayse at [facility] and she states that [facility] does not travel that far. [facility] will be able to take patient between tomorrow and 1/21/2023. CM will follow up with transport and time.          CM informed patient and her spouse Kee about transportation and time to [facility].        Discharge Plan:  COBRA Forms sent to [facility] for transport tomorrow to [facility] at 1300 with RUTH. EMILY will follow up with Ayse from [facility] to get definitive time.

## 2023-01-18 NOTE — DISCHARGE PLANNING
I have submitted a reservation transport request for PT with [facility]- I will probably not get confirmation until tomorrow but transport process has started.  SLC

## 2023-01-18 NOTE — CARE PLAN
The patient is Stable - Low risk of patient condition declining or worsening    Shift Goals  Clinical Goals: skin care, pain control  Patient Goals: pain control  Family Goals: No family present    Progress made toward(s) clinical / shift goals:  dressing changed per order, Q2 turns when pt allows, medicating for pain PRN per MAR    Patient is not progressing towards the following goals:

## 2023-01-19 LAB
ANION GAP SERPL CALC-SCNC: 8 MMOL/L (ref 7–16)
BUN SERPL-MCNC: 22 MG/DL (ref 8–22)
CALCIUM SERPL-MCNC: 7.8 MG/DL (ref 8.5–10.5)
CHLORIDE SERPL-SCNC: 104 MMOL/L (ref 96–112)
CO2 SERPL-SCNC: 25 MMOL/L (ref 20–33)
CREAT SERPL-MCNC: 0.73 MG/DL (ref 0.5–1.4)
ERYTHROCYTE [DISTWIDTH] IN BLOOD BY AUTOMATED COUNT: 61.6 FL (ref 35.9–50)
GFR SERPLBLD CREATININE-BSD FMLA CKD-EPI: 88 ML/MIN/1.73 M 2
GLUCOSE SERPL-MCNC: 87 MG/DL (ref 65–99)
HCT VFR BLD AUTO: 29 % (ref 37–47)
HGB BLD-MCNC: 8.7 G/DL (ref 12–16)
MCH RBC QN AUTO: 30 PG (ref 27–33)
MCHC RBC AUTO-ENTMCNC: 30 G/DL (ref 33.6–35)
MCV RBC AUTO: 100 FL (ref 81.4–97.8)
PLATELET # BLD AUTO: 421 K/UL (ref 164–446)
PMV BLD AUTO: 9.3 FL (ref 9–12.9)
POTASSIUM SERPL-SCNC: 4.3 MMOL/L (ref 3.6–5.5)
RBC # BLD AUTO: 2.9 M/UL (ref 4.2–5.4)
SODIUM SERPL-SCNC: 137 MMOL/L (ref 135–145)
WBC # BLD AUTO: 11.7 K/UL (ref 4.8–10.8)

## 2023-01-19 PROCEDURE — A9270 NON-COVERED ITEM OR SERVICE: HCPCS | Performed by: STUDENT IN AN ORGANIZED HEALTH CARE EDUCATION/TRAINING PROGRAM

## 2023-01-19 PROCEDURE — 700102 HCHG RX REV CODE 250 W/ 637 OVERRIDE(OP): Performed by: STUDENT IN AN ORGANIZED HEALTH CARE EDUCATION/TRAINING PROGRAM

## 2023-01-19 PROCEDURE — 700105 HCHG RX REV CODE 258: Performed by: INTERNAL MEDICINE

## 2023-01-19 PROCEDURE — 700102 HCHG RX REV CODE 250 W/ 637 OVERRIDE(OP): Performed by: INTERNAL MEDICINE

## 2023-01-19 PROCEDURE — 700102 HCHG RX REV CODE 250 W/ 637 OVERRIDE(OP): Performed by: FAMILY MEDICINE

## 2023-01-19 PROCEDURE — 85027 COMPLETE CBC AUTOMATED: CPT

## 2023-01-19 PROCEDURE — A9270 NON-COVERED ITEM OR SERVICE: HCPCS | Performed by: FAMILY MEDICINE

## 2023-01-19 PROCEDURE — 700101 HCHG RX REV CODE 250: Performed by: FAMILY MEDICINE

## 2023-01-19 PROCEDURE — A9270 NON-COVERED ITEM OR SERVICE: HCPCS | Performed by: INTERNAL MEDICINE

## 2023-01-19 PROCEDURE — 700111 HCHG RX REV CODE 636 W/ 250 OVERRIDE (IP): Performed by: INTERNAL MEDICINE

## 2023-01-19 PROCEDURE — 99232 SBSQ HOSP IP/OBS MODERATE 35: CPT | Performed by: FAMILY MEDICINE

## 2023-01-19 PROCEDURE — 36415 COLL VENOUS BLD VENIPUNCTURE: CPT

## 2023-01-19 PROCEDURE — 80048 BASIC METABOLIC PNL TOTAL CA: CPT

## 2023-01-19 PROCEDURE — 770001 HCHG ROOM/CARE - MED/SURG/GYN PRIV*

## 2023-01-19 PROCEDURE — 700111 HCHG RX REV CODE 636 W/ 250 OVERRIDE (IP): Performed by: FAMILY MEDICINE

## 2023-01-19 RX ORDER — FERROUS GLUCONATE 324(38)MG
324 TABLET ORAL 2 TIMES DAILY WITH MEALS
Status: DISCONTINUED | OUTPATIENT
Start: 2023-01-19 | End: 2023-01-22 | Stop reason: HOSPADM

## 2023-01-19 RX ORDER — FUROSEMIDE 20 MG/1
20 TABLET ORAL
Status: DISCONTINUED | OUTPATIENT
Start: 2023-01-20 | End: 2023-01-21

## 2023-01-19 RX ADMIN — LEVOTHYROXINE SODIUM 50 MCG: 0.05 TABLET ORAL at 06:07

## 2023-01-19 RX ADMIN — AMPICILLIN SODIUM AND SULBACTAM SODIUM 3 G: 2; 1 INJECTION, POWDER, FOR SOLUTION INTRAMUSCULAR; INTRAVENOUS at 11:32

## 2023-01-19 RX ADMIN — AMPICILLIN SODIUM AND SULBACTAM SODIUM 3 G: 2; 1 INJECTION, POWDER, FOR SOLUTION INTRAMUSCULAR; INTRAVENOUS at 16:50

## 2023-01-19 RX ADMIN — THERA TABS 1 TABLET: TAB at 06:08

## 2023-01-19 RX ADMIN — AMPICILLIN SODIUM AND SULBACTAM SODIUM 3 G: 2; 1 INJECTION, POWDER, FOR SOLUTION INTRAMUSCULAR; INTRAVENOUS at 23:28

## 2023-01-19 RX ADMIN — ENOXAPARIN SODIUM 40 MG: 40 INJECTION SUBCUTANEOUS at 16:49

## 2023-01-19 RX ADMIN — OXYBUTYNIN CHLORIDE 10 MG: 10 TABLET, EXTENDED RELEASE ORAL at 06:08

## 2023-01-19 RX ADMIN — OXYCODONE HYDROCHLORIDE AND ACETAMINOPHEN 500 MG: 500 TABLET ORAL at 06:08

## 2023-01-19 RX ADMIN — AMPICILLIN SODIUM AND SULBACTAM SODIUM 3 G: 2; 1 INJECTION, POWDER, FOR SOLUTION INTRAMUSCULAR; INTRAVENOUS at 06:08

## 2023-01-19 RX ADMIN — FUROSEMIDE 20 MG: 10 INJECTION, SOLUTION INTRAMUSCULAR; INTRAVENOUS at 06:07

## 2023-01-19 RX ADMIN — CYANOCOBALAMIN TAB 500 MCG 500 MCG: 500 TAB at 06:07

## 2023-01-19 RX ADMIN — SODIUM HYPOCHLORITE 1 ML: 1.25 SOLUTION TOPICAL at 16:50

## 2023-01-19 RX ADMIN — FERROUS GLUCONATE 324 MG: 324 TABLET ORAL at 16:49

## 2023-01-19 RX ADMIN — SODIUM HYPOCHLORITE 1 ML: 1.25 SOLUTION TOPICAL at 06:08

## 2023-01-19 RX ADMIN — FERROUS GLUCONATE 324 MG: 324 TABLET ORAL at 08:53

## 2023-01-19 RX ADMIN — OXYCODONE HYDROCHLORIDE 5 MG: 5 TABLET ORAL at 16:48

## 2023-01-19 RX ADMIN — OXYCODONE HYDROCHLORIDE 5 MG: 5 TABLET ORAL at 11:32

## 2023-01-19 RX ADMIN — OXYCODONE HYDROCHLORIDE 5 MG: 5 TABLET ORAL at 23:18

## 2023-01-19 RX ADMIN — OXYCODONE HYDROCHLORIDE 5 MG: 5 TABLET ORAL at 03:26

## 2023-01-19 ASSESSMENT — ENCOUNTER SYMPTOMS
SORE THROAT: 0
SHORTNESS OF BREATH: 0
COUGH: 0
FOCAL WEAKNESS: 1
MYALGIAS: 0
SPEECH CHANGE: 0
HEARTBURN: 0
VOMITING: 0
DIZZINESS: 0
NECK PAIN: 0
ABDOMINAL PAIN: 0
BACK PAIN: 0
BLURRED VISION: 0
WEAKNESS: 1
FEVER: 0
FLANK PAIN: 0
DIAPHORESIS: 0
WHEEZING: 0
CHILLS: 0
PALPITATIONS: 0
SENSORY CHANGE: 0
DIARRHEA: 0
NAUSEA: 0
NERVOUS/ANXIOUS: 0
HEADACHES: 0

## 2023-01-19 ASSESSMENT — PAIN DESCRIPTION - PAIN TYPE
TYPE: ACUTE PAIN;SURGICAL PAIN
TYPE: SURGICAL PAIN
TYPE: ACUTE PAIN

## 2023-01-19 NOTE — CARE PLAN
The patient is Stable - Low risk of patient condition declining or worsening    Shift Goals  Clinical Goals: D/C, skin integrity, safety, pain control  Patient Goals: Rest  Family Goals: No family present    Progress made toward(s) clinical / shift goals:  pt Q2 turns, bed alarm on.      Problem: Knowledge Deficit - Standard  Goal: Patient and family/care givers will demonstrate understanding of plan of care, disease process/condition, diagnostic tests and medications  Outcome: Progressing     Problem: Skin Integrity  Goal: Skin integrity is maintained or improved  Outcome: Progressing     Problem: Pain - Standard  Goal: Alleviation of pain or a reduction in pain to the patient’s comfort goal  Outcome: Progressing

## 2023-01-19 NOTE — PROGRESS NOTES
Received report from previous shift RN  Assessment complete.  A&O x 4. Patient calls appropriately.  Patient wheelchair bound, parapalegic. Bed alarm on.   Patient has 2/10 pain. Pain managed with prescribed medications.  Denies N&V. Tolerating regular diet.  R thigh laceration, closed with staples.  R knee dressing in place.  Mepilex over ischium.  Parker in place + output, + flatus, + BM.  Patient denies SOB.  SCD's on.    Review plan of care with patient. Call light and personal belongings with in reach. Hourly rounding in place. All needs met at this time.

## 2023-01-19 NOTE — DISCHARGE PLANNING
Transport Request Received: 1/18/2023 at 1456    Transport Company: [employer]    Transport Date: 1/21/2023  Time: 0900    [place] [facility] [address] [facility]    Notified care team of scheduled transport via Voalte.      If there are any changes needed to the DC transportation scheduled, please contact [facility] at ext. [phone] between the hours of 6208-0267 Mon-Fri. If outside those hours, contact the ED Case Manager at ext. [phone].

## 2023-01-19 NOTE — PROGRESS NOTES
4 Eyes Skin Assessment Completed     Head WDL  Ears WDL  Nose WDL  Mouth WDL  Neck WDL  Breast/Chest WDL  Shoulder Blades WDL  Spine WDL  (R) Arm/Elbow/Hand WDL  (L) Arm/Elbow/Hand Redness and Swelling to forearm  Abdomen WDL  Groin WDL  Scrotum/Coccyx/Buttocks Right ishium Pressure injury, packing and mepilex in place  (R) Leg 2+Edema, Incision upper thigh with staples, knee with sutures and dressing in place   (L) Leg 2+Edema  (R) Heel/Foot/Toe heel Non-Blanching, Discoloration, Bruising, and Edema with heel mepilex  (L) Heel/Foot/Toe heel Non-Blanching, Bruising, and Edema with heel mepilex              Devices In Places Pulse Ox, SCD's, and Nasal Cannula        Interventions In Place NC W/Ear Foams, Heel Mepilex, Sacral Mepilex, Pillows, Low Air Loss Mattress, Q2 turns and Heels Loaded W/Pillows     Possible Skin Injury Yes     Pictures Uploaded Into Epic Yes  Wound Consult Placed N/A  RN Wound Prevention Protocol Ordered Yes

## 2023-01-19 NOTE — DISCHARGE PLANNING
Care Transition Team Discharge Planning    Anticipated Discharge Information  Discharge Disposition: D/T to SNF with Medicare cert in anticipation of skilled care (03)              Discharge Plan:  CM awaiting time that [facility] can take patient to [place] to begin rehab. Quote received from [facility] at 9030.00. CM will pursue [facility] for transport.

## 2023-01-19 NOTE — PROGRESS NOTES
4 Eyes Skin Assessment Completed by Edson RN and Karen, RN.    Head WDL  Ears WDL  Nose WDL  Mouth WDL  Neck WDL  Breast/Chest WDL  Shoulder Blades WDL  Spine WDL  (R) Arm/Elbow/Hand Bruising  (L) Arm/Elbow/Hand Bruising  Abdomen WDL  Groin Redness, excoriating inbetween thighs, holguin  Scrotum/Coccyx/Buttocks Redness and Blanching  (R) Leg R leg laceration, stapled close, R knee wound, DIP  (L) Leg edema  (R) Heel/Foot/Toe edema  (L) Heel/Foot/Toe edema          Devices In Places Pulse Ox, Holguin, SCD's, and Nasal Cannula      Interventions In Place NC W/Ear Foams, Sacral Mepilex, TAP System, Pillows, Q2 Turns, Barrier Cream, and Pressure Redistribution Mattress    Possible Skin Injury No    Pictures Uploaded Into Epic N/A  Wound Consult Placed N/A  RN Wound Prevention Protocol Ordered No

## 2023-01-19 NOTE — PROGRESS NOTES
[facility] Medicine Daily Progress Note    Date of Service  1/19/2023    Chief Complaint  Dyana Conway is a 72 y.o. female admitted 1/8/2023 with open distal femur fracture    Hospital Course  Admitted with Right Open distal femur fracture after a fall.  Patient with known history of paraplegia secondary to history of transverse myelitis.  She was started on empiric coverage with IV Vancomycin and Rocephin.  Orthopedic Surgery was consulted on the case.  Patient underwent Wound debridement with multiple soft tissue and bone cultures, Retrograde intramedullary femoral fixation with cement supplementation on 1/9/2023.  Postoperatively she had hypotension and required transfer to the ICU. She was placed on IV steroids.  Wound cultures showed E. coli, Enterococcus, and Finegoldia.  Antibiotics were changed to IV Unasyn.  She was noted to have Acute respiratory failure with hypoxia, also noted to have bilateral pleural effusions and was started on IV Lasix for diuresis.  Ultrasound guided thoracentesis was ordered, but it was too small to be drained safely.    Interval Problem Update  Femur fracture -pain controlled, cultures showed Enterococcus, Finegoldia, E. Coli, discussed case with Orthopedics  Resp failure - O2 4 lpm  Pleural effusion - too small to drain    I have discussed this patient's plan of care and discharge plan at IDT rounds today with Case Management, Nursing, Nursing leadership, and other members of the IDT team.    Consultants/Specialty  critical care and orthopedics    Code Status  Full Code    Disposition  Patient is not medically cleared for discharge.   Anticipate discharge to to skilled nursing facility.  I have placed the appropriate orders for post-discharge needs.    Review of Systems  Review of Systems   Constitutional:  Positive for malaise/fatigue. Negative for chills, diaphoresis and fever.   HENT:  Positive for hearing loss. Negative for congestion and sore throat.    Eyes:  Negative  for blurred vision.   Respiratory:  Negative for cough, shortness of breath and wheezing.    Cardiovascular:  Negative for chest pain, palpitations and leg swelling.   Gastrointestinal:  Negative for abdominal pain, diarrhea, heartburn, nausea and vomiting.   Genitourinary:  Negative for dysuria, flank pain and hematuria.   Musculoskeletal:  Positive for joint pain. Negative for back pain, myalgias and neck pain.   Skin:  Negative for rash.   Neurological:  Positive for focal weakness and weakness. Negative for dizziness, sensory change, speech change and headaches.   Psychiatric/Behavioral:  The patient is not nervous/anxious.       Physical Exam  Temp:  [36.3 °C (97.4 °F)-36.7 °C (98.1 °F)] 36.7 °C (98.1 °F)  Pulse:  [71-85] 72  Resp:  [16-18] 18  BP: ()/(52-94) 94/52  SpO2:  [92 %-98 %] 92 %    Physical Exam  Vitals and nursing note reviewed.   HENT:      Head: Normocephalic and atraumatic.      Nose: No congestion.      Mouth/Throat:      Mouth: Mucous membranes are moist.   Eyes:      Extraocular Movements: Extraocular movements intact.      Conjunctiva/sclera: Conjunctivae normal.   Cardiovascular:      Rate and Rhythm: Normal rate and regular rhythm.   Pulmonary:      Effort: Pulmonary effort is normal.      Breath sounds: Decreased air movement present. Examination of the right-lower field reveals decreased breath sounds. Examination of the left-lower field reveals decreased breath sounds. Decreased breath sounds present.   Abdominal:      General: There is no distension.      Tenderness: There is no abdominal tenderness. There is no guarding or rebound.   Musculoskeletal:      Cervical back: No tenderness.      Right lower leg: Edema present.      Left lower leg: Edema present.   Skin:     Comments: Decubitus ulcer at right ischial tuberosity area   Neurological:      Mental Status: She is alert and oriented to person, place, and time.      Cranial Nerves: No cranial nerve deficit.      Motor:  Weakness (MMT BUE 5/5, BLE 0-1/5) present.       Fluids    Intake/Output Summary (Last 24 hours) at 1/19/2023 1423  Last data filed at 1/19/2023 1200  Gross per 24 hour   Intake 1228.17 ml   Output 2050 ml   Net -821.83 ml         Laboratory  Recent Labs     01/17/23 0237 01/19/23  0209   WBC 11.0* 11.7*   RBC 2.92* 2.90*   HEMOGLOBIN 8.8* 8.7*   HEMATOCRIT 29.1* 29.0*   MCV 99.7* 100.0*   MCH 30.1 30.0   MCHC 30.2* 30.0*   RDW 62.1* 61.6*   PLATELETCT 431 421   MPV 8.9* 9.3       Recent Labs     01/17/23 0237 01/19/23  0209   SODIUM 141 137   POTASSIUM 4.5 4.3   CHLORIDE 108 104   CO2 28 25   GLUCOSE 88 87   BUN 16 22   CREATININE 0.67 0.73   CALCIUM 7.8* 7.8*                     Imaging  US-ABDOMEN COMPLETE SURVEY   Final Result         1.  Atherosclerosis   2.  Bilateral pleural effusion   3.  Trace perihepatic ascites      US-CHEST   Final Result      1.  Small right pleural effusion identified too small to safely be aspirated.      2.  Thoracentesis deferred.      CT-CTA CHEST PULMONARY ARTERY W/ RECONS   Final Result      1.  Negative for pulmonary embolism      2.  Moderate-sized right and small-moderate size left pleural effusion      3.  Bilateral atelectasis            EC-ECHOCARDIOGRAM COMPLETE W/O CONT   Final Result      DX-CHEST-PORTABLE (1 VIEW)   Final Result      Small BILATERAL pleural effusions with overlying atelectasis or airspace disease      DX-FEMUR-2+ RIGHT   Final Result      Postoperative change from resection of the distal RIGHT femur with internal fixation.      DX-PORTABLE FLUORO > 1 HOUR   Final Result      Portable fluoroscopy utilized for 1 minute 42 seconds.         INTERPRETING LOCATION: [address] [place] [address], [address]      DX-FEMUR-2+ RIGHT   Final Result      Portable intraoperative imaging with findings as described above.      CT-FOREIGN FILM CAT SCAN   Final Result      DX-CHEST-PORTABLE (1 VIEW)   Final Result         1. No acute cardiopulmonary abnormalities are identified.       CT-KNEE W/O PLUS RECONS RIGHT   Final Result         1. There is an open wound in the anterior aspect with small amount of gas.      2. Subacute significant displays comminuted fracture of the distal femur with multiple displaced fragments.      3. Significant fluid and heterotopic calcification seen in the lateral aspect of the distal femoral fracture with rarefaction of the bones. This could reflects subacute nature of the fracture with superimposed infection/osteomyelitis given the open    wound. Underlying lytic mass cannot be excluded. MRI with contrast may be helpful for further evaluation.      CT-CSPINE WITHOUT PLUS RECONS   Final Result         1. No acute fracture from C1 through T1 is visualized.         CT-HEAD W/O   Final Result         1. No acute intracranial abnormality. No evidence of acute intracranial hemorrhage or mass lesion.                            Assessment/Plan  * Open fracture of right distal femur (HCC)- (present on admission)  Assessment & Plan  Wound debridement with multiple soft tissue and bone cultures, Retrograde intramedullary femoral fixation with cement supplementation.   1/9/2023  Pain control, wound care  WBAT RLE  IV Unasyn  IR recommendation - may use Augmentin on discharge with end date 2/20/2023    Septic shock (HCC)- (present on admission)  Assessment & Plan  off steroids    Hypothyroid- (present on admission)  Assessment & Plan  Synthroid  Repeat TSH on 2/9/2023    Fall- (present on admission)  Assessment & Plan  PT and OT    Paraplegia (HCC)- (present on admission)  Assessment & Plan  Secondary to transverse myelitis  PT and OT    Decubitus ulcer- (present on admission)  Assessment & Plan  Wound care    Pleural effusion  Assessment & Plan  IV Lasix  Thoracenteses deferred as right pleural effusion too small to be safely drained 1/17/2023    Hypokalemia- (present on admission)  Assessment & Plan  Follow BMP    Acute respiratory failure with hypoxia (HCC)- (present  on admission)  Assessment & Plan  Encourage I-S, RT protocol  IV Lasix    Anemia- (present on admission)  Assessment & Plan  Follow cbc      Neurogenic bladder- (present on admission)  Assessment & Plan  Parker catheter in place for urinary retention    Mild protein-calorie malnutrition (HCC)- (present on admission)  Assessment & Plan  Nutrition consult  Supplements         VTE prophylaxis: enoxaparin ppx    I have performed a physical exam and reviewed and updated ROS and Plan today (1/19/2023). In review of yesterday's note (1/18/2023), there are no changes except as documented above.

## 2023-01-19 NOTE — DISCHARGE PLANNING
Care Transition Team Discharge Planning    Anticipated Discharge Information  Discharge Disposition: D/T to SNF with Medicare cert in anticipation of skilled care (03)              Discharge Plan:  EMILY spoke with Ayse at [facility] and she states that the earliest that transport can be set up is Saturday 1/21/2023 at 9AM with [facility]. CM asked Ayse to get quote to submit to CM leadership. EMILY called and let Terri at [facility] know date and time. Terri states that she will get everything organized on their end. EMILY will continue to follow for quote and with all discharge needs.

## 2023-01-20 ENCOUNTER — APPOINTMENT (OUTPATIENT)
Dept: RADIOLOGY | Facility: MEDICAL CENTER | Age: 73
DRG: 853 | End: 2023-01-20
Attending: FAMILY MEDICINE
Payer: MEDICARE

## 2023-01-20 LAB
SARS-COV+SARS-COV-2 AG RESP QL IA.RAPID: NOTDETECTED
SPECIMEN SOURCE: NORMAL

## 2023-01-20 PROCEDURE — A9270 NON-COVERED ITEM OR SERVICE: HCPCS | Performed by: STUDENT IN AN ORGANIZED HEALTH CARE EDUCATION/TRAINING PROGRAM

## 2023-01-20 PROCEDURE — 99223 1ST HOSP IP/OBS HIGH 75: CPT | Performed by: INTERNAL MEDICINE

## 2023-01-20 PROCEDURE — 700102 HCHG RX REV CODE 250 W/ 637 OVERRIDE(OP): Performed by: STUDENT IN AN ORGANIZED HEALTH CARE EDUCATION/TRAINING PROGRAM

## 2023-01-20 PROCEDURE — 700102 HCHG RX REV CODE 250 W/ 637 OVERRIDE(OP): Performed by: FAMILY MEDICINE

## 2023-01-20 PROCEDURE — 02HV33Z INSERTION OF INFUSION DEVICE INTO SUPERIOR VENA CAVA, PERCUTANEOUS APPROACH: ICD-10-PCS | Performed by: FAMILY MEDICINE

## 2023-01-20 PROCEDURE — A9270 NON-COVERED ITEM OR SERVICE: HCPCS | Performed by: FAMILY MEDICINE

## 2023-01-20 PROCEDURE — 700105 HCHG RX REV CODE 258: Performed by: INTERNAL MEDICINE

## 2023-01-20 PROCEDURE — 700102 HCHG RX REV CODE 250 W/ 637 OVERRIDE(OP): Performed by: INTERNAL MEDICINE

## 2023-01-20 PROCEDURE — C1751 CATH, INF, PER/CENT/MIDLINE: HCPCS

## 2023-01-20 PROCEDURE — 770001 HCHG ROOM/CARE - MED/SURG/GYN PRIV*

## 2023-01-20 PROCEDURE — 99232 SBSQ HOSP IP/OBS MODERATE 35: CPT | Performed by: FAMILY MEDICINE

## 2023-01-20 PROCEDURE — A9270 NON-COVERED ITEM OR SERVICE: HCPCS | Performed by: INTERNAL MEDICINE

## 2023-01-20 PROCEDURE — 700111 HCHG RX REV CODE 636 W/ 250 OVERRIDE (IP): Performed by: INTERNAL MEDICINE

## 2023-01-20 PROCEDURE — 700105 HCHG RX REV CODE 258: Performed by: NURSE PRACTITIONER

## 2023-01-20 PROCEDURE — 87426 SARSCOV CORONAVIRUS AG IA: CPT

## 2023-01-20 RX ORDER — OXYCODONE HYDROCHLORIDE 5 MG/1
5 TABLET ORAL EVERY 4 HOURS PRN
Qty: 18 TABLET | Refills: 0 | Status: SHIPPED | OUTPATIENT
Start: 2023-01-20 | End: 2023-01-23

## 2023-01-20 RX ORDER — FERROUS GLUCONATE 324(38)MG
324 TABLET ORAL 2 TIMES DAILY WITH MEALS
Qty: 30 TABLET | Status: SHIPPED
Start: 2023-01-20

## 2023-01-20 RX ORDER — ENOXAPARIN SODIUM 100 MG/ML
40 INJECTION SUBCUTANEOUS DAILY
Status: ACTIVE | DISCHARGE
Start: 2023-01-20

## 2023-01-20 RX ORDER — PIPERACILLIN SODIUM, TAZOBACTAM SODIUM 3; .375 G/15ML; G/15ML
3.38 INJECTION, POWDER, LYOPHILIZED, FOR SOLUTION INTRAVENOUS EVERY 8 HOURS
Refills: 0 | Status: ACTIVE | DISCHARGE
Start: 2023-01-21 | End: 2023-02-20

## 2023-01-20 RX ORDER — AMOXICILLIN AND CLAVULANATE POTASSIUM 875; 125 MG/1; MG/1
1 TABLET, FILM COATED ORAL 2 TIMES DAILY
Qty: 60 TABLET | Refills: 11 | Status: ACTIVE | DISCHARGE
Start: 2023-02-21 | End: 2023-02-22

## 2023-01-20 RX ORDER — ASCORBIC ACID 500 MG
500 TABLET ORAL DAILY
Qty: 30 TABLET | Status: SHIPPED
Start: 2023-01-21

## 2023-01-20 RX ORDER — SODIUM CHLORIDE, SODIUM LACTATE, POTASSIUM CHLORIDE, AND CALCIUM CHLORIDE .6; .31; .03; .02 G/100ML; G/100ML; G/100ML; G/100ML
500 INJECTION, SOLUTION INTRAVENOUS ONCE
Status: COMPLETED | OUTPATIENT
Start: 2023-01-20 | End: 2023-01-21

## 2023-01-20 RX ORDER — SODIUM CHLORIDE, SODIUM LACTATE, POTASSIUM CHLORIDE, AND CALCIUM CHLORIDE .6; .31; .03; .02 G/100ML; G/100ML; G/100ML; G/100ML
500 INJECTION, SOLUTION INTRAVENOUS ONCE
Status: COMPLETED | OUTPATIENT
Start: 2023-01-20 | End: 2023-01-20

## 2023-01-20 RX ORDER — FUROSEMIDE 20 MG/1
20 TABLET ORAL DAILY
Qty: 60 TABLET | Status: SHIPPED
Start: 2023-01-21 | End: 2023-01-21

## 2023-01-20 RX ADMIN — OXYBUTYNIN CHLORIDE 10 MG: 10 TABLET, EXTENDED RELEASE ORAL at 05:38

## 2023-01-20 RX ADMIN — OXYCODONE HYDROCHLORIDE 5 MG: 5 TABLET ORAL at 21:21

## 2023-01-20 RX ADMIN — OXYCODONE HYDROCHLORIDE 5 MG: 5 TABLET ORAL at 04:39

## 2023-01-20 RX ADMIN — FUROSEMIDE 20 MG: 20 TABLET ORAL at 05:38

## 2023-01-20 RX ADMIN — AMPICILLIN SODIUM AND SULBACTAM SODIUM 3 G: 2; 1 INJECTION, POWDER, FOR SOLUTION INTRAMUSCULAR; INTRAVENOUS at 11:47

## 2023-01-20 RX ADMIN — FERROUS GLUCONATE 324 MG: 324 TABLET ORAL at 17:08

## 2023-01-20 RX ADMIN — SODIUM HYPOCHLORITE 1 ML: 1.25 SOLUTION TOPICAL at 15:40

## 2023-01-20 RX ADMIN — SODIUM CHLORIDE, POTASSIUM CHLORIDE, SODIUM LACTATE AND CALCIUM CHLORIDE 500 ML: 600; 310; 30; 20 INJECTION, SOLUTION INTRAVENOUS at 23:41

## 2023-01-20 RX ADMIN — OXYCODONE HYDROCHLORIDE 5 MG: 5 TABLET ORAL at 17:07

## 2023-01-20 RX ADMIN — OXYCODONE HYDROCHLORIDE AND ACETAMINOPHEN 500 MG: 500 TABLET ORAL at 05:38

## 2023-01-20 RX ADMIN — AMPICILLIN SODIUM AND SULBACTAM SODIUM 3 G: 2; 1 INJECTION, POWDER, FOR SOLUTION INTRAMUSCULAR; INTRAVENOUS at 17:10

## 2023-01-20 RX ADMIN — AMPICILLIN SODIUM AND SULBACTAM SODIUM 3 G: 2; 1 INJECTION, POWDER, FOR SOLUTION INTRAMUSCULAR; INTRAVENOUS at 05:41

## 2023-01-20 RX ADMIN — OXYCODONE HYDROCHLORIDE 5 MG: 5 TABLET ORAL at 13:05

## 2023-01-20 RX ADMIN — SODIUM HYPOCHLORITE 1 ML: 1.25 SOLUTION TOPICAL at 05:38

## 2023-01-20 RX ADMIN — CYANOCOBALAMIN TAB 500 MCG 500 MCG: 500 TAB at 05:38

## 2023-01-20 RX ADMIN — SODIUM CHLORIDE, POTASSIUM CHLORIDE, SODIUM LACTATE AND CALCIUM CHLORIDE 500 ML: 600; 310; 30; 20 INJECTION, SOLUTION INTRAVENOUS at 20:05

## 2023-01-20 RX ADMIN — LEVOTHYROXINE SODIUM 50 MCG: 0.05 TABLET ORAL at 05:38

## 2023-01-20 RX ADMIN — FERROUS GLUCONATE 324 MG: 324 TABLET ORAL at 08:01

## 2023-01-20 RX ADMIN — OXYCODONE HYDROCHLORIDE 5 MG: 5 TABLET ORAL at 08:53

## 2023-01-20 RX ADMIN — THERA TABS 1 TABLET: TAB at 05:38

## 2023-01-20 RX ADMIN — ENOXAPARIN SODIUM 40 MG: 40 INJECTION SUBCUTANEOUS at 17:07

## 2023-01-20 ASSESSMENT — PAIN DESCRIPTION - PAIN TYPE
TYPE: ACUTE PAIN
TYPE: SURGICAL PAIN
TYPE: ACUTE PAIN
TYPE: SURGICAL PAIN
TYPE: ACUTE PAIN
TYPE: SURGICAL PAIN

## 2023-01-20 ASSESSMENT — ENCOUNTER SYMPTOMS
CHILLS: 0
WHEEZING: 0
VOMITING: 0
NERVOUS/ANXIOUS: 0
COUGH: 0
SPEECH CHANGE: 0
DIZZINESS: 0
NECK PAIN: 0
BACK PAIN: 0
SENSORY CHANGE: 0
HEADACHES: 0
WEAKNESS: 1
FEVER: 0
SORE THROAT: 0
ABDOMINAL PAIN: 0
PALPITATIONS: 0
DIARRHEA: 0
CONSTIPATION: 0
HEARTBURN: 0
BLURRED VISION: 0
SPUTUM PRODUCTION: 0
SHORTNESS OF BREATH: 0
FOCAL WEAKNESS: 1
FLANK PAIN: 0
MYALGIAS: 1
NAUSEA: 0
MYALGIAS: 0
DIAPHORESIS: 0

## 2023-01-20 NOTE — THERAPY
Physical Therapy Contact Note    Patient Name: Dyana Conway  Age:  72 y.o., Sex:  female  Medical Record #: [id number]  Today's Date: 1/20/2023    Upon further discussion with ortho PA, pt is confirmed to be WBAT on right LE; will continue to work toward pivot w/c transfers as was prior level of functiona    Isabel JACOBSON, PT,  [phone]

## 2023-01-20 NOTE — CARE PLAN
The patient is Stable - Low risk of patient condition declining or worsening    Shift Goals  Clinical Goals: D/C, skin integrity, pain control  Patient Goals: Rest  Family Goals: haylee    Progress made toward(s) clinical / shift goals:  pt pain medicated per MAR, Q2 turns in place.      Problem: Knowledge Deficit - Standard  Goal: Patient and family/care givers will demonstrate understanding of plan of care, disease process/condition, diagnostic tests and medications  Outcome: Progressing     Problem: Skin Integrity  Goal: Skin integrity is maintained or improved  Outcome: Progressing     Problem: Pain - Standard  Goal: Alleviation of pain or a reduction in pain to the patient’s comfort goal  Outcome: Progressing

## 2023-01-20 NOTE — THERAPY
Physical Therapy Contact Note    Contacted ortho PA given rounding note from ortho MD (could not locate in voalte), pt is NWB right LE per ortho PA; no orders placed by ortho MD, or procedure note comments; given pts prior level of function of pivoting only on right LE she will likely be bed bound and will require family training/portia lift at SNF until weight bearing liberalized; will follow for treatment when able    Isabel JACOBSON, PT,  [phone]

## 2023-01-20 NOTE — PROGRESS NOTES
Ortho Progress Note    S: ANTHONY. Pain is well controlled.       O:  Vitals:    01/20/23 0435   BP: 97/50   Pulse: 71   Resp: 18   Temp: 36.1 °C (97 °F)   SpO2: 94%          Physical Exam:  Gen: Aox3  Resp: Stable on room air, unlabored respirations        RLE  Dressings clean dry and intact, compartments soft and compressible  Dressing removed, small area of wound dehiscence centrally, < 5 mm,  Wound is dry, with no active drainage, no pus, no erythema      Recent Labs     01/19/23  0209   WBC 11.7*   RBC 2.90*   HEMOGLOBIN 8.7*   HEMATOCRIT 29.0*   .0*   MCH 30.0   RDW 61.6*   PLATELETCT 421   MPV 9.3       Recent Labs     01/19/23  0209   SODIUM 137   POTASSIUM 4.3   CHLORIDE 104   CO2 25   GLUCOSE 87   BUN 22         Assessment: 72 y.o. female s/p   PREOPERATIVE DIAGNOSIS:  Two-week-old right type I distal femur fracture.     POSTOPERATIVE DIAGNOSIS:  Two-week-old right type I distal femur fracture.     OPERATIONS PERFORMED:  1.  Wound debridement with multiple soft tissue and bone cultures.  2.  Retrograde intramedullary femoral fixation with cement supplementation.      Plan:  Weight bearing status: NWB RLE  Small area of wound dehiscence cleaned and re-approximated with staples, to aid in final wound healing  Remainder of wound is well healed  No erythema, no drainage currently  Leave dressing on until 1/23- ortho to change then  Continue abx- patient will need lifelong suppression due to severity of infection, and indwelling Intramedullary nail that cannot be removed  PT consulted  DVT ppx    Dispo:   OK for d/c from Ortho standpoint     Follow up with Dr. Chapa at the [facility] in 2 weeks

## 2023-01-20 NOTE — PROGRESS NOTES
4 Eyes Skin Assessment Completed by CAREN Sandhu and CAREN Valencia.    Head WDL  Ears WDL  Nose WDL  Mouth WDL  Neck WDL  Breast/Chest WDL  Shoulder Blades WDL  Spine WDL  (R) Arm/Elbow/Hand Bruising  (L) Arm/Elbow/Hand Bruising  Abdomen WDL  Groin holguin  Scrotum/Coccyx/Buttocks Redness and Blanching, wound DIP from admit.  (R) Leg Incision to R knee, scattered lacerations  (L) Leg Edema  (R) Heel/Foot/Toe Edema  (L) Heel/Foot/Toe Edema          Devices In Places Pulse Ox, Holguin, SCD's, and Nasal Cannula      Interventions In Place NC W/Ear Foams, Heel Mepilex, Sacral Mepilex, TAP System, Pillows, Q2 Turns, Low Air Loss Mattress, and Heels Loaded W/Pillows    Possible Skin Injury No    Pictures Uploaded Into Epic N/A  Wound Consult Placed N/A  RN Wound Prevention Protocol Ordered No

## 2023-01-20 NOTE — CARE PLAN
Problem: Knowledge Deficit - Standard  Goal: Patient and family/care givers will demonstrate understanding of plan of care, disease process/condition, diagnostic tests and medications  1/19/2023 1732 by Sanjay Jack R.N.  Outcome: Progressing  1/19/2023 1729 by Sanjay Jack R.N.  Outcome: Progressing     Problem: Pain - Standard  Goal: Alleviation of pain or a reduction in pain to the patient’s comfort goal  1/19/2023 1732 by Sanjay Jack R.N.  Outcome: Progressing  1/19/2023 1729 by Sanjay Jack R.N.  Outcome: Progressing   The patient is Stable - Low risk of patient condition declining or worsening    Shift Goals  Clinical Goals: mobility, pain control  Patient Goals: rest, mobility  Family Goals: haylee    Progress made toward(s) clinical / shift goals:  pain controlled with oxycodone (2 doses given this shift with good effect, dressing changed to right buttock, dressing to right knee c/d/I and changed by Williams PA - only ortho to touch right knee dressing, iv abx a/o    Patient is not progressing towards the following goals: n/a    Fall precautions/hourly rounding maintained, call light within reach and functioning, all items within reach.  Patient encouraged to call for assistance, poc reviewed with patient, ?'s/concerns answered.

## 2023-01-20 NOTE — PROGRESS NOTES
"   Orthopaedic Progress Note    Interval changes:  Patient doing well     RLE dressing changed due to min saturation- packing found but had not been ordered- discussed with surgical team  Wound team and nursing to no longer do dressing changes to RLE  Dr Marcial to assess needs tomorrow- not cleared at this time    ROS - Patient denies any new issues.  Pain well controlled.    /89   Pulse 73   Temp 36.7 °C (98.1 °F) (Temporal)   Resp 14   Ht 1.676 m (5' 5.98\")   Wt 70.2 kg (154 lb 12.2 oz)   SpO2 93%       Patient seen and examined  No acute distress  Breathing non labored  RRR  RLE dressings CDI, no change from prior neuro status, no issues.     Recent Labs     01/19/23  0209   WBC 11.7*   RBC 2.90*   HEMOGLOBIN 8.7*   HEMATOCRIT 29.0*   .0*   MCH 30.0   MCHC 30.0*   RDW 61.6*   PLATELETCT 421   MPV 9.3       Active Hospital Problems    Diagnosis     Decubitus ulcer [L89.90]     Paraplegia ([facility]) [G82.20]     Fall [W19.XXXA]     Hypothyroid [E03.9]     Pleural effusion [J90]     Hypokalemia [E87.6]     Acute respiratory failure with hypoxia ([facility]) [J96.01]     Open fracture of right distal femur ([facility]) [S72.401B]     Anemia [D64.9]     Septic shock ([facility]) [A41.9, R65.21]     Mild protein-calorie malnutrition ([facility]) [E44.1]     Neurogenic bladder [N31.9]        Assessment/Plan:  Patient doing well     RLE dressing changed due to min saturation- packing found but had not been ordered- discussed with surgical team  Wound team and nursing to no longer do dressing changes to RLE  Dr Marcial to assess needs tomorrow- not cleared at this time  POD#10 S/P   1.  Wound debridement with multiple soft tissue and bone cultures.  2.  Retrograde intramedullary femoral fixation with cement supplementation  Wt bearing status - WBAT  Wound care/Drains - Dressings to be changed every other day by nursing  Future Procedures - none planned   Lovenox: Start 1/10, Duration-until ambulatory > 150'  Sutures/Staples out- 14 " days post operatively  PT/OT-initiated  Antibiotics: unasyn 3g IV q8   DVT Prophylaxis- TEDS/SCDs/Foot pumps  Parker-none  Case Coordination for Discharge Planning - Disposition SNF pending

## 2023-01-20 NOTE — CONSULTS
Consults  INFECTIOUS DISEASES INPATIENT CONSULT NOTE     Date of Service: 1/20/2023    Consult Requested By: Nilo Kapadia M.D.    Reason for Consultation: Osteomyelitis, presence of hardware    History of Present Illness:   Dyana Conway is a 72 y.o.  admitted 1/8/2023. Pt has a past medical history of open distal femur fracture due to fall.  Patient also with known history of paraplegia secondary to transverse myelitis.    Hospital Course:   The patient went to the OR on 1/9 for wound debridement with tissue and bone cultures, retrograde intramedullary femoral fixation.  Per op note fracture fragments were removed no evidence of pus or necrotic tissue.  Reduction with K wires and intramedullary nail placed along with locking screws to help stabilize.  OR cultures from bone + E. coli, Enterococcus faecalis * rare Finegoldia magna .   She was treated with vancomycin and Zosyn and now Unasyn since arrival.  Per orthopedics no plans to remove the intramedullary nail and they are recommending chronic suppression.    Review Of Systems:  Review of Systems   Constitutional:  Negative for chills, fever and malaise/fatigue.   HENT:  Positive for hearing loss.    Eyes:  Negative for blurred vision.   Respiratory:  Negative for cough, sputum production and shortness of breath.    Cardiovascular:  Negative for chest pain and leg swelling.   Gastrointestinal:  Negative for abdominal pain, constipation, diarrhea, nausea and vomiting.   Genitourinary:  Negative for dysuria.   Musculoskeletal:  Positive for myalgias.   Skin:  Negative for rash.   Neurological:  Positive for focal weakness.   Psychiatric/Behavioral:  The patient is not nervous/anxious.      PMH:   Past Medical History:   Diagnosis Date    Allergy     Microcytic anemia 1/8/2023    Muscle disorder     Septic joint of left knee joint (HCC) 1/8/2023    Urinary tract infection, site not specified        PSH:  Past Surgical History:   Procedure Laterality Date     IRRIGATION & DEBRIDEMENT GENERAL Right 1/9/2023    Procedure: IRRIGATION AND DEBRIDEMENT, WOUND;  Surgeon: Handy Chapa M.D.;  Location: SURGERY [facility];  Service: Orthopedics    ORIF, FRACTURE, FEMUR Right 1/9/2023    Procedure: INTRAMEDULLARY NAIL FIXATION OF RIGHT FEMUR  WITH CEMENT SUPPLEMENTATION;  Surgeon: Handy Chapa M.D.;  Location: SURGERY [facility];  Service: Orthopedics    ABDOMINAL EXPLORATION         FAMILY HX:  Family History   Family history unknown: Yes     Reviewed family history. No pertinent family history.     SOCIAL HX:  Social History     Socioeconomic History    Marital status:      Spouse name: Not on file    Number of children: Not on file    Years of education: Not on file    Highest education level: Not on file   Occupational History    Not on file   Tobacco Use    Smoking status: Heavy Smoker     Packs/day: 0.50     Years: 15.00     Pack years: 7.50     Types: Cigarettes    Smokeless tobacco: Not on file   Vaping Use    Vaping Use: Never used   Substance and Sexual Activity    Alcohol use: No    Drug use: Never    Sexual activity: Not on file   Other Topics Concern    Not on file   Social History Narrative    Not on file     Social Determinants of Health     Financial Resource Strain: Not on file   Food Insecurity: Not on file   Transportation Needs: Not on file   Physical Activity: Not on file   Stress: Not on file   Social Connections: Not on file   Intimate Partner Violence: Not on file   Housing Stability: Not on file     Social History     Tobacco Use   Smoking Status Heavy Smoker    Packs/day: 0.50    Years: 15.00    Pack years: 7.50    Types: Cigarettes   Smokeless Tobacco Not on file     Social History     Substance and Sexual Activity   Alcohol Use No       Allergies/Intolerances:  Allergies   Allergen Reactions    Doxycycline Rash     median    Macrodantin [Nitrofurantoin] Unspecified     Unknown    Sulfa Drugs Hives       History reviewed with the patient      Other Current Medications:    Current Facility-Administered Medications:     ferrous gluconate (FERGON) tablet 324 mg, 324 mg, Oral, BID WITH MEALS, Nilo Kapadia M.D., 324 mg at 01/20/23 0801    furosemide (LASIX) tablet 20 mg, 20 mg, Oral, Q DAY, Nilo Kapadia M.D., 20 mg at 01/20/23 0538    multivitamin tablet 1 Tablet, 1 Tablet, Oral, DAILY, Colt Lange M.D., 1 Tablet at 01/20/23 0538    ascorbic acid (Vitamin C) tablet 500 mg, 500 mg, Oral, DAILY, Colt Lange M.D., 500 mg at 01/20/23 0538    ampicillin/sulbactam (UNASYN) 3 g in  mL IVPB, 3 g, Intravenous, Q6HRS, Patric Bansal M.D., Stopped at 01/20/23 0611    enoxaparin (Lovenox) inj 40 mg, 40 mg, Subcutaneous, DAILY AT 1800, Patric Bansal M.D., 40 mg at 01/19/23 1649    oxyCODONE immediate-release (ROXICODONE) tablet 5 mg, 5 mg, Oral, Q4HRS PRN, Matthew Ye M.D., 5 mg at 01/20/23 0853    dakins 0.125% (1/4 strength) topical soln, , Topical, BID, Nilo Kapadia M.D., 1 mL at 01/20/23 0538    senna-docusate (PERICOLACE or SENOKOT S) 8.6-50 MG per tablet 2 Tablet, 2 Tablet, Oral, BID, 2 Tablet at 01/17/23 1845 **AND** polyethylene glycol/lytes (MIRALAX) PACKET 1 Packet, 1 Packet, Oral, QDAY PRN, 1 Packet at 01/17/23 0638 **AND** magnesium hydroxide (MILK OF MAGNESIA) suspension 30 mL, 30 mL, Oral, QDAY PRN, 30 mL at 01/17/23 0638 **AND** bisacodyl (DULCOLAX) suppository 10 mg, 10 mg, Rectal, QDAY PRN, Gabi Gokovski, M.D.    lactated ringers infusion (BOLUS): BMI less than or equal to 30, 30 mL/kg, Intravenous, Once PRN, Gabi Byrne M.D.    acetaminophen (Tylenol) tablet 650 mg, 650 mg, Oral, Q6HRS PRN, Gabi Byrne M.D.    cyanocobalamin (VITAMIN B-12) tablet 500 mcg, 500 mcg, Oral, DAILY, Gabi Byrne M.D., 500 mcg at 01/20/23 0538    levothyroxine (SYNTHROID) tablet 50 mcg, 50 mcg, Oral, AM ES, Gabi Byrne M.D., 50 mcg at 01/20/23 0538    oxybutynin SR (DITROPAN-XL) tablet 10 mg, 10 mg, Oral, BID, Gabi  "LUDWIN Byrne, 10 mg at 23 0538  [unfilled]    Most Recent Vital Signs:  /89   Pulse 73   Temp 36.7 °C (98.1 °F) (Temporal)   Resp 14   Ht 1.676 m (5' 5.98\")   Wt 70.2 kg (154 lb 12.2 oz)   SpO2 93%   BMI 24.99 kg/m²   Temp  Av.7 °C (98.1 °F)  Min: 36.1 °C (97 °F)  Max: 37.3 °C (99.1 °F)    Physical Exam:  Physical Exam  Constitutional:       Appearance: Normal appearance.   HENT:      Head: Normocephalic and atraumatic.      Right Ear: External ear normal.      Left Ear: External ear normal.      Nose: Nose normal.      Mouth/Throat:      Mouth: Mucous membranes are moist.      Pharynx: Oropharynx is clear.   Eyes:      Extraocular Movements: Extraocular movements intact.      Conjunctiva/sclera: Conjunctivae normal.      Pupils: Pupils are equal, round, and reactive to light.   Cardiovascular:      Rate and Rhythm: Normal rate and regular rhythm.      Heart sounds: Normal heart sounds.   Pulmonary:      Effort: Pulmonary effort is normal.      Breath sounds: Normal breath sounds.   Abdominal:      General: Abdomen is flat. Bowel sounds are normal.      Palpations: Abdomen is soft.   Musculoskeletal:      Cervical back: Normal range of motion and neck supple.      Comments: Right leg with surgical dressings in place, clean dry and intact   Skin:     General: Skin is warm.      Comments: IT and cocci ulcers, reviewed photos, no obvious infection   Neurological:      Mental Status: She is alert and oriented to person, place, and time.      Comments: She has some chronic hearing loss and also paraplegia   Psychiatric:         Mood and Affect: Mood normal.         Behavior: Behavior normal.         Pertinent Lab Results:  Recent Labs     23  020   WBC 11.7*      Recent Labs     23   HEMOGLOBIN 8.7*   HEMATOCRIT 29.0*   .0*   MCH 30.0   PLATELETCT 421         Recent Labs     23   SODIUM 137   POTASSIUM 4.3   CHLORIDE 104   CO2 25   CREATININE 0.73 "        No results for input(s): ALBUMIN in the last 72 hours.    Invalid input(s): AST, ALT, ALKPHOS, BILITOT, TOTALBILIRUB, BILIRUBINTOT, BILIRUBINDIR, BILIRUBININD, ALKALINEPHOS     Pertinent Micro:  Results       Procedure Component Value Units Date/Time    CULTURE WOUND W/ GRAM STAIN [[id number]]  (Abnormal) Collected: 01/09/23 1135    Order Status: Completed Specimen: Wound Updated: 01/17/23 1602     Significant Indicator POS     Source WND     Site DISTAL FEMUR #2     Culture Result -     Gram Stain Result Few WBCs.  No organisms seen.       Culture Result Escherichia coli  Light growth  See previous culture for sensitivity report.        Enterococcus faecalis  Rare growth  See previous culture for sensitivity report.      Narrative:      Surgery - swabs received    Anaerobic Culture [[id number]]  (Abnormal) Collected: 01/09/23 1135    Order Status: Completed Specimen: Wound Updated: 01/17/23 1602     Significant Indicator POS     Source WND     Site DISTAL FEMUR #2     Culture Result -      Finegoldia magna  Rare growth      Narrative:      Surgery - swabs received    Fungal Culture [[id number]] Collected: 01/09/23 1135    Order Status: Completed Specimen: Wound Updated: 01/17/23 1602     Significant Indicator NEG     Source WND     Site DISTAL FEMUR #2     Culture Result No fungal growth to date.     Fungal Smear Results No fungal elements seen.    Narrative:      Surgery - swabs received    Fungal Culture [[id number]] Collected: 01/09/23 1135    Order Status: Completed Specimen: Wound Updated: 01/17/23 1601     Significant Indicator NEG     Source WND     Site DISTAL FEMUR #3     Culture Result No fungal growth to date.     Fungal Smear Results No fungal elements seen.    Narrative:      Surgery - swabs received    CULTURE WOUND W/ GRAM STAIN [[id number]]  (Abnormal) Collected: 01/09/23 1135    Order Status: Completed Specimen: Wound Updated: 01/17/23 1601     Significant Indicator POS     Source WND     Site  DISTAL FEMUR #1     Culture Result -     Gram Stain Result Few WBCs.  No organisms seen.       Culture Result Escherichia coli  Rare growth  See previous culture for sensitivity report.        Enterococcus faecalis  Rare growth  See previous culture for sensitivity report.      Narrative:      Surgery - swabs received    Anaerobic Culture [[id number]] Collected: 01/09/23 1135    Order Status: Completed Specimen: Wound Updated: 01/17/23 1601     Significant Indicator NEG     Source WND     Site DISTAL FEMUR #1     Culture Result No Anaerobes isolated.    Narrative:      Surgery - swabs received    Fungal Culture [[id number]] Collected: 01/09/23 1135    Order Status: Completed Specimen: Wound Updated: 01/17/23 1601     Significant Indicator NEG     Source WND     Site DISTAL FEMUR #1     Culture Result No fungal growth to date.     Fungal Smear Results No fungal elements seen.    Narrative:      Surgery - swabs received    CULTURE WOUND W/ GRAM STAIN [[id number]]  (Abnormal) Collected: 01/09/23 1135    Order Status: Completed Specimen: Wound Updated: 01/17/23 1601     Significant Indicator POS     Source WND     Site DISTAL FEMUR #3     Culture Result -     Gram Stain Result Few WBCs.  No organisms seen.       Culture Result Escherichia coli  Rare growth  See previous culture for sensitivity report.        Enterococcus faecalis  Rare growth  See previous culture for sensitivity report.      Narrative:      Surgery - swabs received    Anaerobic Culture [[id number]]  (Abnormal) Collected: 01/09/23 1135    Order Status: Completed Specimen: Wound Updated: 01/17/23 1601     Significant Indicator POS     Source WND     Site DISTAL FEMUR #3     Culture Result -      Finegoldia magna  Rare growth      Narrative:      Surgery - swabs received    Anaerobic Culture [[id number]] Collected: 01/09/23 1135    Order Status: Completed Specimen: Tissue Updated: 01/17/23 1601     Significant Indicator NEG     Source TISS     Site DISTAL  FEMUR TISSUE     Culture Result No Anaerobes isolated.    Narrative:      Surgery Specimen    Fungal Culture [[id number]] Collected: 01/09/23 1135    Order Status: Completed Specimen: Tissue Updated: 01/17/23 1601     Significant Indicator NEG     Source TISS     Site DISTAL FEMUR TISSUE     Culture Result No fungal growth to date.     Fungal Smear Results No fungal elements seen.    Narrative:      Surgery Specimen    AFB Culture [[id number]] Collected: 01/09/23 1135    Order Status: Completed Specimen: Tissue Updated: 01/17/23 1601     Significant Indicator NEG     Source TISS     Site DISTAL FEMUR TISSUE     Culture Result Culture in progress.     AFB Smear Results No acid fast bacilli seen.    Narrative:      Surgery Specimen    CULTURE TISSUE W/ GRM STAIN [[id number]]  (Abnormal)  (Susceptibility) Collected: 01/09/23 1135    Order Status: Completed Specimen: Tissue Updated: 01/17/23 1601     Significant Indicator POS     Source TISS     Site DISTAL FEMUR TISSUE     Culture Result Growth noted after further incubation, see below for  organism identification.       Gram Stain Result Rare WBCs.  No organisms seen.       Culture Result Escherichia coli  Rare growth  Isolated from enrichment broth only, please correlate with  clinical condition.      Narrative:      Surgery Specimen    Susceptibility       Escherichia coli (1)       Antibiotic Interpretation Microscan   Method Status    Ampicillin Sensitive <=8 mcg/mL OJN Final    Ceftriaxone Sensitive <=1 mcg/mL JON Final    Cefazolin Sensitive <=2 mcg/mL JON Final    Ciprofloxacin Sensitive <=0.25 mcg/mL JON Final    Cefepime Sensitive <=2 mcg/mL JON Final    Cefuroxime Sensitive <=4 mcg/mL JON Final    Ampicillin/sulbactam Sensitive <=4/2 mcg/mL JON Final    Ertapenem Sensitive <=0.5 mcg/mL JON Final    Tobramycin Sensitive <=2 mcg/mL JON Final    Gentamicin Sensitive <=2 mcg/mL JON Final    Minocycline Sensitive <=4 mcg/mL JON Final    Moxifloxacin Sensitive <=2  "mcg/mL JON Final    Pip/Tazobactam Sensitive <=8 mcg/mL JON Final    Trimeth/Sulfa Sensitive <=0.5/9.5 mcg/mL JON Final    Tigecycline Sensitive <=2 mcg/mL JON Final                       FLUID CULTURE W/GRAM STAIN [[id number]]     Order Status: Canceled Specimen: Body Fluid from Thoracentesis Fluid     AFB CULTURE [[id number]]     Order Status: Canceled Specimen: Body Fluid from Thoracentesis Fluid     FUNGAL CULTURE [[id number]]     Order Status: Canceled Specimen: Body Fluid from Thoracentesis Fluid     BLOOD CULTURE [[id number]] Collected: 01/08/23 2149    Order Status: Completed Specimen: Blood from Peripheral Updated: 01/13/23 2300     Significant Indicator NEG     Source BLD     Site PERIPHERAL     Culture Result No growth after 5 days of incubation.    Narrative:      Per Hospital Policy: Only change Specimen Src: to \"Line\" if  specified by physician order.  Left Hand    BLOOD CULTURE [[id number]] Collected: 01/08/23 2201    Order Status: Completed Specimen: Blood from Peripheral Updated: 01/13/23 2300     Significant Indicator NEG     Source BLD     Site PERIPHERAL     Culture Result No growth after 5 days of incubation.    Narrative:      Per Hospital Policy: Only change Specimen Src: to \"Line\" if  specified by physician order.  Right Hand          No results found for: BLOODCULTU, BLDCULT, BCHOLD     Studies:  CT-CSPINE WITHOUT PLUS RECONS    Result Date: 1/8/2023 1/8/2023 8:23 PM HISTORY/REASON FOR EXAM: trauma green GLF TECHNIQUE/EXAM DESCRIPTION: CT cervical spine without contrast, with reconstructions. The study was performed on a helical multidetector CT scanner. Thin-section helical scanning was performed from the skull base through upper thoracic spine. Sagittal and coronal multiplanar reconstructions were generated from the axial images. Low dose optimization technique was utilized for this CT exam including automated exposure control and adjustment of the mA and/or kV according to patient size. " COMPARISON:  None. FINDINGS: Alignment in the cervical spine is normal. There is no fracture or dislocation. The craniovertebral junction appears intact. The prevertebral and paraspinous soft tissues are unremarkable. Moderate degenerative change of the cervical spine, most at C4-5. ___________________________________     1. No acute fracture from C1 through T1 is visualized.     CT-HEAD W/O    Result Date: 1/8/2023 1/8/2023 8:23 PM HISTORY/REASON FOR EXAM:  trauma green GLF fell 3 weeks ago after becoming lightheaded and dizzy TECHNIQUE/EXAM DESCRIPTION AND NUMBER OF VIEWS: CT of the head without contrast. The study was performed on a helical multidetector CT scanner. Contiguous 2.5 mm axial sections were obtained from the skull base through the vertex. Up to date radiation dose reduction adjustments have been utilized to meet ALARA standards for radiation dose reduction. COMPARISON:  None available FINDINGS: There is no evidence of acute intracranial hemorrhage, mass, mass-effect or shift of midline structures. Gray-white matter differentiation is grossly preserved. Ventricle size and brain parenchymal volume are appropriate for this patient's stated age. The basal cisterns are patent. There are no abnormal extra-axial fluid collections. No depressed or widely  calvarial fracture is seen. The visualized paranasal sinuses and temporal bone structures are aerated. ___________________________________     1. No acute intracranial abnormality. No evidence of acute intracranial hemorrhage or mass lesion.     CT-KNEE W/O PLUS RECONS RIGHT    Result Date: 1/8/2023 1/8/2023 8:24 PM HISTORY/REASON FOR EXAM:  Right Knee, Trauma green GLF. TECHNIQUE/ EXAM DESCRIPTION AND NUMBER OF VIEWS:  CT scan of the RIGHT knee without contrast, with reconstructions. Noncontrast thin helical sections were obtained from the distal femur through the proximal tibia/fibula. Sagittal and coronal reconstructions were generated from  the axial images. Up to date radiation dose reduction adjustments have been utilized to meet ALARA standards for radiation dose reduction. COMPARISON:  None. FINDINGS: Diffuse severe osseous demineralization. Subacute significant displays comminuted fracture of the distal femur with multiple displaced fragments. Significant fluid and heterotopic calcification seen in the lateral aspect of the distal femur fracture with rarefaction of the bones There is an open wound in the anterior aspect with small amount of gas. Acute mildly depressed fracture of the medial tibial plateau.     1. There is an open wound in the anterior aspect with small amount of gas. 2. Subacute significant displays comminuted fracture of the distal femur with multiple displaced fragments. 3. Significant fluid and heterotopic calcification seen in the lateral aspect of the distal femoral fracture with rarefaction of the bones. This could reflects subacute nature of the fracture with superimposed infection/osteomyelitis given the open wound. Underlying lytic mass cannot be excluded. MRI with contrast may be helpful for further evaluation.    DX-CHEST-PORTABLE (1 VIEW)    Result Date: 1/11/2023 1/11/2023 8:04 AM HISTORY/REASON FOR EXAM:  Shortness of Breath TECHNIQUE/EXAM DESCRIPTION AND NUMBER OF VIEWS: Single portable view of the chest. COMPARISON: 11/8/2023 FINDINGS: HEART: Stable size. There is atherosclerotic calcification in the aortic arch. LUNGS: There are bibasilar opacities. PLEURA: There is blunting of each costophrenic sulcus     Small BILATERAL pleural effusions with overlying atelectasis or airspace disease    DX-CHEST-PORTABLE (1 VIEW)    Result Date: 1/8/2023 1/8/2023 10:05 PM HISTORY/REASON FOR EXAM:  Cough Hypoxia TECHNIQUE/EXAM DESCRIPTION AND NUMBER OF VIEWS: Single portable view of the chest. COMPARISON: None FINDINGS: No pulmonary infiltrates or consolidations are noted. No pleural effusion. No pneumothorax. Normal  cardiopericardial silhouette.     1. No acute cardiopulmonary abnormalities are identified.    US-ABDOMEN COMPLETE SURVEY    Result Date: 1/17/2023 1/17/2023 5:12 AM HISTORY/REASON FOR EXAM:  Edema, Pain TECHNIQUE/EXAM DESCRIPTION AND NUMBER OF VIEWS:  Complete abdomen survey. COMPARISON: None FINDINGS: The liver is normal in contour. There is no evidence of solid mass lesion. The liver measures 14.86 cm. The gallbladder is normal. There is no evidence of cholelithiasis. The gallbladder wall thickness measures 2.50 mm. There is no pericholecystic fluid. The common duct measures 3.40 mm. The visualized pancreas is unremarkable. The visualized aorta is normal in caliber. Echogenic atherosclerotic plaque is seen. Intrahepatic IVC is patent. The portal vein is patent with hepatopetal flow. The MPV measures 1.02 cm. The right kidney measures 9.72 cm. The left kidney measures 9.22 cm. There is no hydronephrosis. The spleen measures 7.91 cm maximally. The bladder is decompressed around a Parker catheter. Trace perihepatic ascites is seen. Bilateral pleural effusions are seen, right greater than left.     1.  Atherosclerosis 2.  Bilateral pleural effusion 3.  Trace perihepatic ascites    US-CHEST    Result Date: 1/16/2023 1/16/2023 1:43 PM HISTORY/REASON FOR EXAM:  Shortness of breath; fluid check for thora TECHNIQUE/EXAM DESCRIPTION AND NUMBER OF VIEWS: Ultrasound of the right chest COMPARISON: CTA pulmonary arteries 1/15/2023 FINDINGS: There is a small right pleural effusion identified. Thoracentesis deferred.     1.  Small right pleural effusion identified too small to safely be aspirated. 2.  Thoracentesis deferred.    DX-PORTABLE FLUORO > 1 HOUR    Result Date: 1/9/2023 1/9/2023 1:25 PM HISTORY/REASON FOR EXAM:  Right femur IM nail TECHNIQUE/EXAM DESCRIPTION AND NUMBER OF VIEWS: Portable fluoroscopy for greater than one hour in OR. FINDINGS: The portable fluoroscopy unit was obligated to the procedure for greater  than one hour. Actual fluoro time was 1 minute 42 seconds.     Portable fluoroscopy utilized for 1 minute 42 seconds. INTERPRETING LOCATION: [address] [place], [place] [address], [id number]    CT-CTA CHEST PULMONARY ARTERY W/ RECONS    Result Date: 1/15/2023  1/15/2023 2:23 PM HISTORY/REASON FOR EXAM:  PE suspected, high prob Respiratory failure and hypoxia TECHNIQUE/EXAM DESCRIPTION: CT angiogram scan for pulmonary embolism with contrast, with reconstructions. 1.25 mm helical sections were obtained from the lung apices through the lung bases following the rapid bolus administration of 59 mL of Omnipaque 350 nonionic contrast. Thin-section overlapping reconstruction interval was utilized. Coronal reconstructions were generated from the axial data. MIP post processing was performed and utilized for the interpretation. Low dose optimization technique was utilized for this CT exam including automated exposure control and adjustment of the mA and/or kV according to patient size. COMPARISON: None FINDINGS: PULMONARY ARTERIES: Well visualized.  There are no emboli. Mediastinum and hilum: There is no adenopathy or mass within the axilla, the mediastinum, or steven. AORTA: is normal in appearance. LUNGS: There is mild to moderate bilateral dependent atelectasis. PLEURA: There is a moderate-sized right pleural effusion and a small-moderate-sized left pleural effusion. BONES: There are no significant osseous abnormalities. ABDOMEN: The visualized portions of the upper abdomen are within normal limits.    1.  Negative for pulmonary embolism 2.  Moderate-sized right and small-moderate size left pleural effusion 3.  Bilateral atelectasis     EC-ECHOCARDIOGRAM COMPLETE W/O CONT    Result Date: 1/14/2023  Transthoracic Echo Report Echocardiography Laboratory CONCLUSIONS No prior study is available for comparison. Normal left ventricular chamber size. Normal left ventricular wall thickness. The left ventricular ejection fraction is visually estimated to  be 65%. HARISH COLE Exam Date:         2023                    12:25 Exam Location:     Inpatient Priority:          Routine Ordering Physician:        BEAU CALDERON Referring Physician: Sonographer:               Anabel Duong RDCS Age:    72     Gender:    F MRN:    [id number] :    1950 BSA:    1.79   Ht (in):    66     Wt (lb):    154 Exam Type:     Complete Indications:     Shortness of breath ICD Codes:       R06.02 CPT Codes:       85443 BP:   100    /   66     HR:   80 Technical Quality:       Fair MEASUREMENTS  (Male / Female) Normal Values 2D ECHO LV Diastolic Diameter PLAX        3.8 cm                4.2 - 5.9 / 3.9 - 5.3 cm LV Systolic Diameter PLAX         2.6 cm                2.1 - 4.0 cm IVS Diastolic Thickness           0.7 cm                LVPW Diastolic Thickness          0.77 cm               LVOT Diameter                     2 cm                  LV Ejection Fraction MOD BP       64.7 %                >= 55  % LV Ejection Fraction MOD 4C       59.9 %                LV Ejection Fraction MOD 2C       68.7 %                IVC Diameter                      1.6 cm                DOPPLER AV Peak Velocity                  1.5 m/s               AV Peak Gradient                  9.1 mmHg              AV Mean Gradient                  5 mmHg                LVOT Peak Velocity                1.4 m/s               AV Area Cont Eq vti               2.6 cm2               Mitral E Point Velocity           0.91 m/s              Mitral E to A Ratio               1.4                   MV Pressure Half Time             54.7 ms               MV Area PHT                       4 cm2                 MV Deceleration Time              188 ms                * Indicates values subject to auto-interpretation LV EF:        % FINDINGS Left Ventricle Normal left ventricular chamber size. Normal left ventricular wall thickness. Normal left ventricular systolic function. The left ventricular ejection  fraction is visually estimated to be 65%. Normal regional wall motion. Normal diastolic function. Right Ventricle The right ventricle is normal in size and systolic function. Right Atrium The right atrium is normal in size. Normal inferior vena cava size and inspiratory collapse. Left Atrium The left atrium is normal in size. Left atrial volume index is 18 mL/sq m. Mitral Valve Structurally normal mitral valve without significant stenosis or regurgitation. Aortic Valve Structurally normal aortic valve without significant stenosis or regurgitation. Tricuspid Valve Structurally normal tricuspid valve without significant stenosis or regurgitation. Unable to estimate right ventricular systolic pressure due to an inadequate tricuspid regurgitant jet. Pulmonic Valve Structurally normal pulmonic valve without significant stenosis or regurgitation. Pericardium Normal pericardium without effusion. Fat pat present. Aorta Normal aortic root for body surface area. The ascending aorta diameter is 2.8 cm. Aiden Smart M.D. (Electronically Signed) Final Date:     14 January 2023                 13:52    DX-FEMUR-2+ RIGHT    Result Date: 1/9/2023 1/9/2023 6:34 PM HISTORY/REASON FOR EXAM:  post op R femur. TECHNIQUE/EXAM DESCRIPTION AND NUMBER OF VIEWS:  4 views of the RIGHT femur. COMPARISON: 1/9/2023 FINDINGS: Images show internal fixation of the femur with intramedullary brian and multiple screws present.  Resection of a portion of the distal femoral metadiaphysis with bone cement placement. Skin staples and soft tissue gas consistent with recent surgery. No dislocation. Diffuse osteopenia.     Postoperative change from resection of the distal RIGHT femur with internal fixation.    DX-FEMUR-2+ RIGHT    Result Date: 1/9/2023 1/9/2023 1:25 PM HISTORY/REASON FOR EXAM:  Pain/Deformity Following Trauma; Main OR. . TECHNIQUE/EXAM DESCRIPTION AND NUMBER OF VIEWS:  9 views of the RIGHT femur. COMPARISON: None FINDINGS: 10 portable  intraoperative fluoroscopic views of the right femur were obtained. Fluoroscopy was utilized for 1 minute 42 seconds.     Portable intraoperative imaging with findings as described above.      ASSESSMENT/PLAN:     72 y.o.  admitted 1/8/2023. Pt has a past medical history of open distal femur fracture due to fall.  Patient also with paraplegia secondary to transverse myelitis.    Hospital Course:   The patient went to the OR on 1/9 for wound debridement with tissue and bone cultures, retrograde intramedullary femoral fixation.  Per op note fracture fragments were removed no evidence of pus or necrotic tissue.  Reduction with K wires and intramedullary nail placed along with locking screws to help stabilize.  OR cultures from bone + E. coli, Enterococcus faecalis & rare Finegoldia magna .   She was treated with vancomycin and Zosyn and now Unasyn since arrival.  Per orthopedics no plans to remove the intramedullary nail and they are recommending chronic suppression.    Problem List    Osteomyelitis secondary to below  Open distal femur fracture  -See details on surgical procedure as above  Paraplegia secondary to transverse myelitis  Pressure ulcers  Neurogenic bladder  Antibiotic allergies: Doxycycline described as rash, sulfa drugs described as hives    Plan     --- Continue Unasyn and given bone involvement, hardware in place and most recent photos showing some wound dehiscence and bleeding will recommend a 6-week IV antibiotic course from date of OR - end 2/20/22   THEN   --- After completing IV course transition to Augmentin 875/125 twice daily for chronic suppression.  --- Follow-up in ID clinic at the end of the IV antibiotic course to initiate orals.  If unable to come into renal may be able to have her oral antibiotics be managed by PCP in [place] but will need to further discuss.  If she is coming in to [place] to see her surgeon then we can try and facilitate our visit to be on the same day.  --- Monitor labs  -while on IV antibiotics recommend weekly CBC and CMP,ESR every 2 weeks   ----Okay to place midline    Okay for follow-up with NP [facility]    Dispo: Plan is for discharge to skilled facility     PICC: Midline ordered       Plan of care discussed with JORYDN Kapadia M.D..ID will sign off.     Faye King M.D.      ADDENDA     Notified the the SNF does not have any unasyn in stock. OK to use zosyn 3.375 g every 8 hours instead of unasyn for same duration as above and then transition to orals.

## 2023-01-20 NOTE — PROGRESS NOTES
Hospital Medicine Daily Progress Note    Date of Service  1/20/2023    Chief Complaint  Dyana Conway is a 72 y.o. female admitted 1/8/2023 with open distal femur fracture    Hospital Course  Admitted with Right Open distal femur fracture after a fall.  Patient with known history of paraplegia secondary to history of transverse myelitis.  She was started on empiric coverage with IV Vancomycin and Rocephin.  Orthopedic Surgery was consulted on the case.  Patient underwent Wound debridement with multiple soft tissue and bone cultures, Retrograde intramedullary femoral fixation with cement supplementation on 1/9/2023.  Postoperatively she had hypotension and required transfer to the ICU. She was placed on IV steroids.  Wound cultures showed E. coli, Enterococcus, and Finegoldia.  Antibiotics were changed to IV Unasyn.  She was noted to have Acute respiratory failure with hypoxia, also noted to have bilateral pleural effusions and was started on IV Lasix for diuresis.  Ultrasound guided thoracentesis was ordered, but it was too small to be drained safely.    Interval Problem Update  Femur fracture -pain controlled, cultures showed Enterococcus, Finegoldia, E. Coli, discussed case with Orthopedics, plan to place wound vac recommendations noted for lifelong suppression   Resp failure - O2 2 lpm    I have discussed this patient's plan of care and discharge plan at IDT rounds today with Case Management, Nursing, Nursing leadership, and other members of the IDT team.    Consultants/Specialty  critical care, infectious disease, and orthopedics    Code Status  Full Code    Disposition  Patient is not medically cleared for discharge.   Anticipate discharge to to skilled nursing facility.  I have placed the appropriate orders for post-discharge needs.    Review of Systems  Review of Systems   Constitutional:  Positive for malaise/fatigue. Negative for chills, diaphoresis and fever.   HENT:  Positive for hearing loss.  Negative for congestion and sore throat.    Eyes:  Negative for blurred vision.   Respiratory:  Negative for cough, shortness of breath and wheezing.    Cardiovascular:  Negative for chest pain, palpitations and leg swelling.   Gastrointestinal:  Negative for abdominal pain, diarrhea, heartburn, nausea and vomiting.   Genitourinary:  Negative for dysuria, flank pain and hematuria.   Musculoskeletal:  Positive for joint pain. Negative for back pain, myalgias and neck pain.   Skin:  Negative for rash.   Neurological:  Positive for focal weakness and weakness. Negative for dizziness, sensory change, speech change and headaches.   Psychiatric/Behavioral:  The patient is not nervous/anxious.       Physical Exam  Temp:  [36.1 °C (97 °F)-36.9 °C (98.4 °F)] 36.7 °C (98.1 °F)  Pulse:  [71-79] 73  Resp:  [14-18] 14  BP: ()/(50-89) 113/89  SpO2:  [93 %-94 %] 93 %    Physical Exam  Vitals and nursing note reviewed.   HENT:      Head: Normocephalic and atraumatic.      Nose: No congestion.      Mouth/Throat:      Mouth: Mucous membranes are moist.   Eyes:      Extraocular Movements: Extraocular movements intact.      Conjunctiva/sclera: Conjunctivae normal.   Cardiovascular:      Rate and Rhythm: Normal rate and regular rhythm.   Pulmonary:      Effort: Pulmonary effort is normal.      Breath sounds: Decreased air movement present. Examination of the right-lower field reveals decreased breath sounds. Examination of the left-lower field reveals decreased breath sounds. Decreased breath sounds present.   Abdominal:      General: There is no distension.      Tenderness: There is no abdominal tenderness. There is no guarding or rebound.   Musculoskeletal:      Cervical back: No tenderness.      Right lower leg: Edema present.      Left lower leg: Edema present.   Skin:     Comments: Decubitus ulcer at right ischial tuberosity area   Neurological:      Mental Status: She is alert and oriented to person, place, and time.       Cranial Nerves: No cranial nerve deficit.      Motor: Weakness (MMT BUE 5/5, BLE 0-1/5) present.       Fluids    Intake/Output Summary (Last 24 hours) at 1/20/2023 1154  Last data filed at 1/20/2023 0900  Gross per 24 hour   Intake 672.81 ml   Output 1325 ml   Net -652.19 ml       Laboratory  Recent Labs     01/19/23  0209   WBC 11.7*   RBC 2.90*   HEMOGLOBIN 8.7*   HEMATOCRIT 29.0*   .0*   MCH 30.0   MCHC 30.0*   RDW 61.6*   PLATELETCT 421   MPV 9.3     Recent Labs     01/19/23  0209   SODIUM 137   POTASSIUM 4.3   CHLORIDE 104   CO2 25   GLUCOSE 87   BUN 22   CREATININE 0.73   CALCIUM 7.8*                   Imaging  US-ABDOMEN COMPLETE SURVEY   Final Result         1.  Atherosclerosis   2.  Bilateral pleural effusion   3.  Trace perihepatic ascites      US-CHEST   Final Result      1.  Small right pleural effusion identified too small to safely be aspirated.      2.  Thoracentesis deferred.      CT-CTA CHEST PULMONARY ARTERY W/ RECONS   Final Result      1.  Negative for pulmonary embolism      2.  Moderate-sized right and small-moderate size left pleural effusion      3.  Bilateral atelectasis            EC-ECHOCARDIOGRAM COMPLETE W/O CONT   Final Result      DX-CHEST-PORTABLE (1 VIEW)   Final Result      Small BILATERAL pleural effusions with overlying atelectasis or airspace disease      DX-FEMUR-2+ RIGHT   Final Result      Postoperative change from resection of the distal RIGHT femur with internal fixation.      DX-PORTABLE FLUORO > 1 HOUR   Final Result      Portable fluoroscopy utilized for 1 minute 42 seconds.         INTERPRETING LOCATION: [address] [place] [address], [facility]      DX-FEMUR-2+ RIGHT   Final Result      Portable intraoperative imaging with findings as described above.      CT-FOREIGN FILM CAT SCAN   Final Result      DX-CHEST-PORTABLE (1 VIEW)   Final Result         1. No acute cardiopulmonary abnormalities are identified.      CT-KNEE W/O PLUS RECONS RIGHT   Final Result         1. There is  an open wound in the anterior aspect with small amount of gas.      2. Subacute significant displays comminuted fracture of the distal femur with multiple displaced fragments.      3. Significant fluid and heterotopic calcification seen in the lateral aspect of the distal femoral fracture with rarefaction of the bones. This could reflects subacute nature of the fracture with superimposed infection/osteomyelitis given the open    wound. Underlying lytic mass cannot be excluded. MRI with contrast may be helpful for further evaluation.      CT-CSPINE WITHOUT PLUS RECONS   Final Result         1. No acute fracture from C1 through T1 is visualized.         CT-HEAD W/O   Final Result         1. No acute intracranial abnormality. No evidence of acute intracranial hemorrhage or mass lesion.                            Assessment/Plan  * Open fracture of right distal femur (HCC)- (present on admission)  Assessment & Plan  Wound debridement with multiple soft tissue and bone cultures, Retrograde intramedullary femoral fixation with cement supplementation.   1/9/2023  Pain control, wound care  WBAT RLE  IV Unasyn  ID recommendation - may use Augmentin on discharge with end date 2/20/2023  Consult ID    Septic shock (HCC)- (present on admission)  Assessment & Plan  off steroids    Hypothyroid- (present on admission)  Assessment & Plan  Synthroid  Repeat TSH on 2/9/2023    Fall- (present on admission)  Assessment & Plan  PT and OT    Paraplegia (HCC)- (present on admission)  Assessment & Plan  Secondary to transverse myelitis  PT and OT    Decubitus ulcer- (present on admission)  Assessment & Plan  Wound care    Pleural effusion  Assessment & Plan  Lasix  Thoracenteses deferred as right pleural effusion too small to be safely drained 1/17/2023    Hypokalemia- (present on admission)  Assessment & Plan  Follow BMP    Acute respiratory failure with hypoxia (HCC)- (present on admission)  Assessment & Plan  Encourage I-S, RT  protocol  Lasix    Anemia- (present on admission)  Assessment & Plan  Follow cbc  Fergon    Neurogenic bladder- (present on admission)  Assessment & Plan  Parker catheter in place for urinary retention    Mild protein-calorie malnutrition (HCC)- (present on admission)  Assessment & Plan  Nutrition consult  Supplements         VTE prophylaxis: enoxaparin ppx    I have performed a physical exam and reviewed and updated ROS and Plan today (1/20/2023). In review of yesterday's note (1/19/2023), there are no changes except as documented above.

## 2023-01-20 NOTE — DISCHARGE SUMMARY
Discharge Summary    CHIEF COMPLAINT ON ADMISSION  Chief Complaint   Patient presents with    Trauma Green     Transfer from [facility]       Reason for Admission  Trauma Transfer    Admission Date  1/8/2023     CODE STATUS  Full Code    HPI & HOSPITAL COURSE  This is a 72 y.o. female here with  Right Open distal femur fracture after a fall.  Patient with known history of paraplegia secondary to history of transverse myelitis.  She was started on empiric coverage with IV Vancomycin and Rocephin.  Orthopedic Surgery was consulted on the case.  Patient underwent Wound debridement with multiple soft tissue and bone cultures, Retrograde intramedullary femoral fixation with cement supplementation on 1/9/2023.  Postoperatively she had hypotension and required transfer to the ICU. She was placed on IV steroids.  Wound cultures showed E. coli, Enterococcus, and Finegoldia.  Antibiotics were changed to IV Unasyn.  She was noted to have Acute respiratory failure with hypoxia, also noted to have bilateral pleural effusions and was started on IV Lasix for diuresis.  Ultrasound guided thoracentesis was ordered, but it was too small to be drained safely.  Orthopedic surgery recommended chronic antibiotic suppression as hardware was still in place.  Infectious disease was then consulted on the case.  Recommendation was IV Unasyn until 2/20/2023, and then to start Augmentin twice daily for chronic lifelong suppression. However, as post acute facility does not have Unasyn on stock, recommendation was changed to IV Zosyn 3.375 g every 8 hours until 2/20/2023, and then Augmentin as stated above.  Her blood pressure was noted to be on the low side, Lasix was discontinued, gentle IVF hydration was done.    Therefore, she is discharged in good and stable condition to skilled nursing facility.    The patient met 2-midnight criteria for an inpatient stay at the time of discharge.      FOLLOW UP ITEMS POST DISCHARGE  Follow up as  below    DISCHARGE DIAGNOSES  Principal Problem:    Open fracture of right distal femur (HCC) POA: Yes  Active Problems:    Septic shock (HCC) POA: Yes    Neurogenic bladder POA: Yes    Anemia POA: Yes    Acute respiratory failure with hypoxia (HCC) POA: Yes    Hypokalemia POA: Yes    Pleural effusion POA: No    Decubitus ulcer POA: Yes    Paraplegia (HCC) POA: Yes    Fall POA: Yes    Hypothyroid POA: Yes    Mild protein-calorie malnutrition (HCC) POA: Yes  Resolved Problems:    * No resolved hospital problems. *      FOLLOW UP  No future appointments.  Handy Chapa M.D.  [address]  [place] [address]  [phone]    Follow up      [facility]- INFECTIOUS DISE[facility]  [address] [place] [address] [id number]  [place] [address]  Follow up on 2/20/2023        MEDICATIONS ON DISCHARGE     Medication List        START taking these medications        Instructions   amoxicillin-clavulanate 875-125 MG Tabs  Start taking on: February 21, 2023  Commonly known as: AUGMENTIN   Take 1 Tablet by mouth 2 times a day for 30 days.  Dose: 1 Tablet     enoxaparin 40 MG/0.4ML Sosy inj  Commonly known as: Lovenox   Inject 40 mg under the skin every day at 6 PM.  Dose: 40 mg     ferrous gluconate 324 (38 Fe) MG Tabs  Commonly known as: FERGON   Take 1 Tablet by mouth 2 times a day with meals.  Dose: 324 mg     multivitamin Tabs   Take 1 Tablet by mouth every day.  Dose: 1 Tablet     oxyCODONE immediate-release 5 MG Tabs  Commonly known as: ROXICODONE   Take 1 Tablet by mouth every four hours as needed for Severe Pain for up to 3 days.  Dose: 5 mg     piperacillin-tazobactam 3.375 (3-0.375) g Solr  Commonly known as: Zosyn   Infuse 3,375 mg into a venous catheter every 8 hours for 30 days.  Dose: 3.375 g            CHANGE how you take these medications        Instructions   ascorbic acid 500 MG tablet  What changed:   medication strength  how much to take  Commonly known as: Vitamin C   Take 1 Tablet by mouth every day.  Dose: 500 mg             CONTINUE taking these medications        Instructions   levothyroxine 50 MCG Tabs  Commonly known as: SYNTHROID   Take 50 mcg by mouth every morning on an empty stomach.  Dose: 50 mcg            STOP taking these medications      Cranberry 450 MG Caps     cyanocobalamin 500 MCG Tabs  Commonly known as: VITAMIN B-12     D-Mannose 500 MG Caps     lysine 500 MG Tabs     oxybutynin SR 10 MG CR tablet  Commonly known as: DITROPAN-XL     pyridoxine 50 MG Tabs  Commonly known as: VITAMIN B-6     therapeutic multivitamin-minerals Tabs     vitamin e 400 UNIT Caps  Commonly known as: VITAMIN E              Allergies  Allergies   Allergen Reactions    Doxycycline Rash     median    Macrodantin [Nitrofurantoin] Unspecified     Unknown    Sulfa Drugs Hives       DIET  Orders Placed This Encounter   Procedures    Diet Order Diet: Regular     Standing Status:   Standing     Number of Occurrences:   1     Order Specific Question:   Diet:     Answer:   Regular [1]       ACTIVITY  As tolerated and directed by skilled nursing.  Weight bearing as tolerated    LINES, DRAINS, AND WOUNDS  This is an automated list. Peripheral IVs will be removed prior to discharge.  Peripheral IV 01/15/23 20 G Anterior;Distal;Left Upper arm (Active)   Site Assessment Clean;Dry;Intact 01/20/23 0900   Dressing Type Occlusive;Transparent 01/20/23 0900   Line Status Infusing 01/20/23 0900   Dressing Status Clean;Dry;Intact 01/20/23 0900   Dressing Intervention N/A 01/20/23 0900   Dressing Change Due 01/22/23 01/20/23 0900   Date Primary Tubing Changed 01/14/23 01/20/23 0900   Date Secondary Tubing Changed 01/20/23 01/20/23 0900   NEXT Primary Tubing Change  01/21/23 01/20/23 0900   NEXT Secondary Tubing Change  01/21/23 01/20/23 0900   Date IV Connector(s) Changed 01/14/23 01/17/23 0900   Infiltration Grading ([facility], [facility]) 0 01/20/23 0900   Phlebitis Scale ([facility] Only) 0 01/20/23 0900     Urethral Catheter (Active)   Site Assessment Clean;Skin intact  01/20/23 0900   Collection Container Standard drainage bag 01/20/23 0900   Urinary Catheter Care Tamper Evident Seal in Place;Drainage Tube Extended;Drainage Bag Not Overfilled;Drainage Tubing Properly Secured;Drainage Bag Below Bladder Level and Not on Floor 01/20/23 0900   Securement Method Securing device (Describe) 01/20/23 0900   Output (mL) 300 mL 01/20/23 0900      Wound 01/09/23 Incision Knee Right staples, xeroform, 4x4, soft roll, ace wrap, 7 incisions (Active)   Wound Image    01/18/23 1200   Site Assessment ABDIRASHID 01/20/23 0845   Periwound Assessment ABDIRASHID 01/20/23 0845   Margins ABDIRASHID 01/20/23 0845   Closure ABDIRASHID 01/20/23 0845   Drainage Amount None 01/20/23 0845   Drainage Description ABDIRASHID 01/19/23 2030   Treatments Cleansed;Irrigation 01/19/23 0600   Wound Cleansing Dakin's Solution 01/19/23 0600   Periwound Protectant Skin Protectant Wipes to Periwound 01/18/23 1200   Dressing Cleansing/Solutions 1/4 Strength Dakin's Solution 01/19/23 0600   Dressing Options Ace Wrap 01/20/23 0845   Dressing Changed Observed 01/20/23 0845   Dressing Status Clean;Dry;Intact 01/20/23 0845   Dressing Change/Treatment Frequency Every Shift, and As Needed 01/19/23 0815   NEXT Dressing Change/Treatment Date 01/18/23 01/18/23 1200   Non-staged Wound Description Full thickness 01/18/23 1200   Wound Length (cm) 0.2 cm 01/18/23 1200   Wound Width (cm) 0.2 cm 01/18/23 1200   Wound Surface Area (cm^2) 0.04 cm^2 01/18/23 1200   Tunneling (cm) 7 cm 01/18/23 1200   Tunneling Clock Position of Wound 12 01/18/23 1200   WOUND NURSE ONLY - Time Spent with Patient (mins) 30 01/18/23 1200       Wound 01/09/23 Pressure Injury Ischium Right stage 4 POA (Active)   Wound Image     01/18/23 1200   Site Assessment ABDIRASHID 01/20/23 0845   Periwound Assessment ABDIRASHID 01/20/23 0845   Margins ABDIRASHID 01/20/23 0845   Closure ABDIRASHID 01/20/23 0845   Drainage Amount None 01/20/23 0845   Drainage Description ABDIRASHID 01/20/23 0000   Treatments Offloading 01/20/23 0000   Wound  Cleansing Dakin's Solution 01/20/23 0000   Periwound Protectant Barrier Paste 01/20/23 0000   Dressing Cleansing/Solutions 1/4 Strength Dakin's Solution 01/20/23 0000   Dressing Options Moist Roll Gauze;Mepilex 01/20/23 0845   Dressing Changed Observed 01/20/23 0845   Dressing Status Clean;Dry;Intact 01/20/23 0845   Dressing Change/Treatment Frequency Every Shift, and As Needed 01/20/23 0845   NEXT Dressing Change/Treatment Date 01/20/23 01/20/23 0845   NEXT Weekly Photo (Inpatient Only) 01/25/23 01/18/23 1200   WOUND NURSE ONLY - Pressure Injury Stage 4 01/18/23 1200   Wound Length (cm) 3 cm 01/18/23 1200   Wound Width (cm) 2.5 cm 01/18/23 1200   Wound Depth (cm) 2.2 cm 01/18/23 1200   Wound Surface Area (cm^2) 7.5 cm^2 01/18/23 1200   Wound Volume (cm^3) 16.5 cm^3 01/18/23 1200   Wound Healing % 72 01/18/23 1200   Undermining (cm) 4.6 cm 01/18/23 1200   Undermining of Wound, 1st Location From 12 o'clock;To 1 o'clock 01/18/23 1200   WOUND NURSE ONLY - Time Spent with Patient (mins) 45 01/18/23 1400       Peripheral IV 01/15/23 20 G Anterior;Distal;Left Upper arm (Active)   Site Assessment Clean;Dry;Intact 01/20/23 0900   Dressing Type Occlusive;Transparent 01/20/23 0900   Line Status Infusing 01/20/23 0900   Dressing Status Clean;Dry;Intact 01/20/23 0900   Dressing Intervention N/A 01/20/23 0900   Dressing Change Due 01/22/23 01/20/23 0900   Date Primary Tubing Changed 01/14/23 01/20/23 0900   Date Secondary Tubing Changed 01/20/23 01/20/23 0900   NEXT Primary Tubing Change  01/21/23 01/20/23 0900   NEXT Secondary Tubing Change  01/21/23 01/20/23 0900   Date IV Connector(s) Changed 01/14/23 01/17/23 0900   Infiltration Grading ([facility], [facility]) 0 01/20/23 0900   Phlebitis Scale ([facility] Only) 0 01/20/23 0900           Urethral Catheter (Active)   Site Assessment Clean;Skin intact 01/20/23 0900   Collection Container Standard drainage bag 01/20/23 0900   Urinary Catheter Care Tamper Evident Seal in Place;Drainage Tube  Extended;Drainage Bag Not Overfilled;Drainage Tubing Properly Secured;Drainage Bag Below Bladder Level and Not on Floor 01/20/23 0900   Securement Method Securing device (Describe) 01/20/23 0900   Output (mL) 300 mL 01/20/23 0900        MENTAL STATUS ON TRANSFER  Alert and oriented x 4           CONSULTATIONS  Orthopedic surgery - [place]  Critical care  ID    PROCEDURES  Wound debridement with multiple soft tissue and bone cultures, Retrograde intramedullary femoral fixation with cement supplementation.   1/9/2023    PICC line placement 1/20/2023    LABORATORY  Lab Results   Component Value Date    SODIUM 137 01/19/2023    POTASSIUM 4.3 01/19/2023    CHLORIDE 104 01/19/2023    CO2 25 01/19/2023    GLUCOSE 87 01/19/2023    BUN 22 01/19/2023    CREATININE 0.73 01/19/2023        Lab Results   Component Value Date    WBC 9.0 01/21/2023    HEMOGLOBIN 8.3 (L) 01/21/2023    HEMATOCRIT 27.4 (L) 01/21/2023    PLATELETCT 349 01/21/2023        Total time of the discharge process exceeds 40 minutes.

## 2023-01-20 NOTE — DISCHARGE PLANNING
Care Transition Team Discharge Planning    Anticipated Discharge Information  Discharge Disposition: D/T to SNF with Medicare cert in anticipation of skilled care (03)              Discharge Plan:  EMILY spoke with the MD and he states that patient will be going to [facility] with Unisyn. EMILY spoke with Terri at [facility] and she states that the facility does not have unisyn and is asking if the patient can use Zosyn. EMILY spoke with the MD again and he states that Zosyn will be fine but he is waiting for the dose from ID. CM will continue to follow.

## 2023-01-20 NOTE — CARE PLAN
Problem: Knowledge Deficit - Standard  Goal: Patient and family/care givers will demonstrate understanding of plan of care, disease process/condition, diagnostic tests and medications  Outcome: Progressing     Problem: Skin Integrity  Goal: Skin integrity is maintained or improved  Outcome: Progressing     Problem: Pain - Standard  Goal: Alleviation of pain or a reduction in pain to the patient’s comfort goal  Outcome: Progressing   The patient is Stable - Low risk of patient condition declining or worsening    Shift Goals  Clinical Goals: pain control, Q2 turns  Patient Goals: rest  Family Goals: haylee    Progress made toward(s) clinical / shift goals:  pain managed with ordered medications. Q2 turns performed.    Patient is not progressing towards the following goals:

## 2023-01-20 NOTE — PROGRESS NOTES
Received report from previous shift RN  Assessment complete.  A&O x 4. Patient calls appropriately.  Patient wheelchair bound, parapalegic. Bed alarm on.   Patient has 4/10 pain. Pain managed with prescribed medications.  Denies N&V. Tolerating regular diet.  R thigh laceration, closed with staples.  R knee incision, dressing in place.  Mepilex over ischium.  Parker in place + output, + flatus, + BM.  Patient denies SOB.  SCD's on.     Review plan of care with patient. Call light and personal belongings with in reach. Hourly rounding in place. All needs met at this time.

## 2023-01-20 NOTE — PROGRESS NOTES
"Bedside report received.  Assessment complete.  A&O x 4. Patient calls appropriately.  Patient ambulates with two assist and FWW/WC. Bed alarm on.   Patient has 4-5/10 pain. Pain managed with prescribed medications.  Denies N&V. Tolerating regular diet.  Surgical dressing CDI.  + void, + flatus, + BM.  Patient denies SOB.  SCD's on.  Patient is pleasant and cooperative with the care plan.  Review plan with of care with patient. Call light and personal belongings within reach. Hourly rounding in place. All needs met at this time.   /89   Pulse 73   Temp 36.7 °C (98.1 °F) (Temporal)   Resp 14   Ht 1.676 m (5' 5.98\")   Wt 70.2 kg (154 lb 12.2 oz)   SpO2 93%   BMI 24.99 kg/m²     "

## 2023-01-20 NOTE — CARE PLAN
Problem: Knowledge Deficit - Standard  Goal: Patient and family/care givers will demonstrate understanding of plan of care, disease process/condition, diagnostic tests and medications  Outcome: Progressing     Problem: Pain - Standard  Goal: Alleviation of pain or a reduction in pain to the patient’s comfort goal  Outcome: Progressing   The patient is Stable - Low risk of patient condition declining or worsening    Shift Goals  Clinical Goals: mobility, pain control  Patient Goals: rest, mobility  Family Goals: haylee    Progress made toward(s) clinical / shift goals:  pain controlled with oxycodone (1 dose given this afternoon with good effect, 1unit of PRBC's admin with no s&s of adverse transfusion reaction, large loose incontinent bm today, waiting on snf acceptance    Patient is not progressing towards the following goals: n/a    Fall precautions/hourly rounding maintained, call light within reach and functioning, all items within reach.  Patient encouraged to call for assistance, poc reviewed with patient, ?'s/concerns answered.

## 2023-01-21 PROBLEM — I95.9 HYPOTENSION: Status: ACTIVE | Noted: 2023-01-21

## 2023-01-21 LAB
ANION GAP SERPL CALC-SCNC: 5 MMOL/L (ref 7–16)
BASOPHILS # BLD AUTO: 0.3 % (ref 0–1.8)
BASOPHILS # BLD: 0.03 K/UL (ref 0–0.12)
BUN SERPL-MCNC: 13 MG/DL (ref 8–22)
CALCIUM SERPL-MCNC: 7.7 MG/DL (ref 8.5–10.5)
CHLORIDE SERPL-SCNC: 106 MMOL/L (ref 96–112)
CO2 SERPL-SCNC: 27 MMOL/L (ref 20–33)
CORTIS SERPL-MCNC: 3.3 UG/DL (ref 0–23)
CREAT SERPL-MCNC: 0.49 MG/DL (ref 0.5–1.4)
EOSINOPHIL # BLD AUTO: 0.28 K/UL (ref 0–0.51)
EOSINOPHIL NFR BLD: 3.1 % (ref 0–6.9)
ERYTHROCYTE [DISTWIDTH] IN BLOOD BY AUTOMATED COUNT: 61.9 FL (ref 35.9–50)
GFR SERPLBLD CREATININE-BSD FMLA CKD-EPI: 101 ML/MIN/1.73 M 2
GLUCOSE SERPL-MCNC: 114 MG/DL (ref 65–99)
HCT VFR BLD AUTO: 27.4 % (ref 37–47)
HGB BLD-MCNC: 8.3 G/DL (ref 12–16)
IMM GRANULOCYTES # BLD AUTO: 0.06 K/UL (ref 0–0.11)
IMM GRANULOCYTES NFR BLD AUTO: 0.7 % (ref 0–0.9)
LYMPHOCYTES # BLD AUTO: 1.54 K/UL (ref 1–4.8)
LYMPHOCYTES NFR BLD: 17.2 % (ref 22–41)
MCH RBC QN AUTO: 30 PG (ref 27–33)
MCHC RBC AUTO-ENTMCNC: 30.3 G/DL (ref 33.6–35)
MCV RBC AUTO: 98.9 FL (ref 81.4–97.8)
MONOCYTES # BLD AUTO: 0.69 K/UL (ref 0–0.85)
MONOCYTES NFR BLD AUTO: 7.7 % (ref 0–13.4)
NEUTROPHILS # BLD AUTO: 6.36 K/UL (ref 2–7.15)
NEUTROPHILS NFR BLD: 71 % (ref 44–72)
NRBC # BLD AUTO: 0 K/UL
NRBC BLD-RTO: 0 /100 WBC
PLATELET # BLD AUTO: 349 K/UL (ref 164–446)
PMV BLD AUTO: 9.1 FL (ref 9–12.9)
POTASSIUM SERPL-SCNC: 4 MMOL/L (ref 3.6–5.5)
RBC # BLD AUTO: 2.77 M/UL (ref 4.2–5.4)
SODIUM SERPL-SCNC: 138 MMOL/L (ref 135–145)
WBC # BLD AUTO: 9 K/UL (ref 4.8–10.8)

## 2023-01-21 PROCEDURE — 700111 HCHG RX REV CODE 636 W/ 250 OVERRIDE (IP)

## 2023-01-21 PROCEDURE — A9270 NON-COVERED ITEM OR SERVICE: HCPCS | Performed by: FAMILY MEDICINE

## 2023-01-21 PROCEDURE — A9270 NON-COVERED ITEM OR SERVICE: HCPCS | Performed by: STUDENT IN AN ORGANIZED HEALTH CARE EDUCATION/TRAINING PROGRAM

## 2023-01-21 PROCEDURE — 700102 HCHG RX REV CODE 250 W/ 637 OVERRIDE(OP): Performed by: STUDENT IN AN ORGANIZED HEALTH CARE EDUCATION/TRAINING PROGRAM

## 2023-01-21 PROCEDURE — 94760 N-INVAS EAR/PLS OXIMETRY 1: CPT

## 2023-01-21 PROCEDURE — 700105 HCHG RX REV CODE 258: Performed by: FAMILY MEDICINE

## 2023-01-21 PROCEDURE — 700102 HCHG RX REV CODE 250 W/ 637 OVERRIDE(OP): Performed by: FAMILY MEDICINE

## 2023-01-21 PROCEDURE — 82533 TOTAL CORTISOL: CPT

## 2023-01-21 PROCEDURE — 700105 HCHG RX REV CODE 258: Performed by: INTERNAL MEDICINE

## 2023-01-21 PROCEDURE — 700111 HCHG RX REV CODE 636 W/ 250 OVERRIDE (IP): Performed by: INTERNAL MEDICINE

## 2023-01-21 PROCEDURE — 99232 SBSQ HOSP IP/OBS MODERATE 35: CPT | Performed by: FAMILY MEDICINE

## 2023-01-21 PROCEDURE — A9270 NON-COVERED ITEM OR SERVICE: HCPCS | Performed by: INTERNAL MEDICINE

## 2023-01-21 PROCEDURE — 80048 BASIC METABOLIC PNL TOTAL CA: CPT

## 2023-01-21 PROCEDURE — 85025 COMPLETE CBC W/AUTO DIFF WBC: CPT

## 2023-01-21 PROCEDURE — 700102 HCHG RX REV CODE 250 W/ 637 OVERRIDE(OP): Performed by: INTERNAL MEDICINE

## 2023-01-21 PROCEDURE — 770001 HCHG ROOM/CARE - MED/SURG/GYN PRIV*

## 2023-01-21 RX ORDER — SODIUM CHLORIDE 9 MG/ML
1000 INJECTION, SOLUTION INTRAVENOUS ONCE
Status: DISCONTINUED | OUTPATIENT
Start: 2023-01-21 | End: 2023-01-21

## 2023-01-21 RX ORDER — SODIUM CHLORIDE 9 MG/ML
INJECTION, SOLUTION INTRAVENOUS CONTINUOUS
Status: DISCONTINUED | OUTPATIENT
Start: 2023-01-21 | End: 2023-01-22

## 2023-01-21 RX ORDER — SODIUM CHLORIDE 9 MG/ML
1000 INJECTION, SOLUTION INTRAVENOUS ONCE
Status: COMPLETED | OUTPATIENT
Start: 2023-01-21 | End: 2023-01-21

## 2023-01-21 RX ADMIN — SODIUM CHLORIDE: 9 INJECTION, SOLUTION INTRAVENOUS at 14:12

## 2023-01-21 RX ADMIN — AMPICILLIN SODIUM AND SULBACTAM SODIUM 3 G: 2; 1 INJECTION, POWDER, FOR SOLUTION INTRAMUSCULAR; INTRAVENOUS at 05:41

## 2023-01-21 RX ADMIN — ENOXAPARIN SODIUM 40 MG: 40 INJECTION SUBCUTANEOUS at 17:36

## 2023-01-21 RX ADMIN — SODIUM HYPOCHLORITE 1 ML: 1.25 SOLUTION TOPICAL at 11:31

## 2023-01-21 RX ADMIN — AMPICILLIN SODIUM AND SULBACTAM SODIUM 3 G: 2; 1 INJECTION, POWDER, FOR SOLUTION INTRAMUSCULAR; INTRAVENOUS at 17:38

## 2023-01-21 RX ADMIN — OXYCODONE HYDROCHLORIDE AND ACETAMINOPHEN 500 MG: 500 TABLET ORAL at 05:35

## 2023-01-21 RX ADMIN — FERROUS GLUCONATE 324 MG: 324 TABLET ORAL at 17:36

## 2023-01-21 RX ADMIN — OXYCODONE HYDROCHLORIDE 5 MG: 5 TABLET ORAL at 16:03

## 2023-01-21 RX ADMIN — AMPICILLIN SODIUM AND SULBACTAM SODIUM 3 G: 2; 1 INJECTION, POWDER, FOR SOLUTION INTRAMUSCULAR; INTRAVENOUS at 11:53

## 2023-01-21 RX ADMIN — SODIUM HYPOCHLORITE 20 ML: 1.25 SOLUTION TOPICAL at 02:45

## 2023-01-21 RX ADMIN — OXYCODONE HYDROCHLORIDE 5 MG: 5 TABLET ORAL at 21:01

## 2023-01-21 RX ADMIN — SODIUM CHLORIDE 1000 ML: 9 INJECTION, SOLUTION INTRAVENOUS at 07:49

## 2023-01-21 RX ADMIN — AMPICILLIN SODIUM AND SULBACTAM SODIUM 3 G: 2; 1 INJECTION, POWDER, FOR SOLUTION INTRAMUSCULAR; INTRAVENOUS at 00:31

## 2023-01-21 RX ADMIN — CYANOCOBALAMIN TAB 500 MCG 500 MCG: 500 TAB at 05:35

## 2023-01-21 RX ADMIN — OXYCODONE HYDROCHLORIDE 5 MG: 5 TABLET ORAL at 02:49

## 2023-01-21 RX ADMIN — OXYCODONE HYDROCHLORIDE 5 MG: 5 TABLET ORAL at 07:43

## 2023-01-21 RX ADMIN — HYDROCORTISONE SODIUM SUCCINATE 100 MG: 100 INJECTION, POWDER, FOR SOLUTION INTRAMUSCULAR; INTRAVENOUS at 02:21

## 2023-01-21 RX ADMIN — OXYCODONE HYDROCHLORIDE 5 MG: 5 TABLET ORAL at 11:51

## 2023-01-21 RX ADMIN — LEVOTHYROXINE SODIUM 50 MCG: 0.05 TABLET ORAL at 05:35

## 2023-01-21 RX ADMIN — FERROUS GLUCONATE 324 MG: 324 TABLET ORAL at 07:43

## 2023-01-21 RX ADMIN — THERA TABS 1 TABLET: TAB at 05:35

## 2023-01-21 ASSESSMENT — PAIN DESCRIPTION - PAIN TYPE
TYPE: ACUTE PAIN

## 2023-01-21 ASSESSMENT — ENCOUNTER SYMPTOMS
HEARTBURN: 0
ABDOMINAL PAIN: 0
FOCAL WEAKNESS: 1
CHILLS: 0
COUGH: 0
PALPITATIONS: 0
NAUSEA: 0
WEAKNESS: 1
VOMITING: 0
NECK PAIN: 0
BLURRED VISION: 0
DIZZINESS: 0
MYALGIAS: 0
HEADACHES: 0
WHEEZING: 0
SENSORY CHANGE: 0
FLANK PAIN: 0
FEVER: 0
NERVOUS/ANXIOUS: 0
SPEECH CHANGE: 0
DIARRHEA: 0
SHORTNESS OF BREATH: 0
BACK PAIN: 0
DIAPHORESIS: 0
SORE THROAT: 0

## 2023-01-21 NOTE — PROGRESS NOTES
"Hospitalist and charge RN notified for hypotension. Pt 84/60 with MAP of 68 manual reading. Pt asymptomatic, denies SOB, HA, dizziness, CP. Resting in bed.     Hospitalist Sage ordered 500cc LR bolus with instructions via volt \"Let me know if that doesn't bring it up to the 90s at least.\"    RN requested LR bolus verification from pharmacy and will hang approved.  "

## 2023-01-21 NOTE — PROGRESS NOTES
Bedside shift report received from CAREN Rapp. Pt is alert and fully oriented, NAD. Denies any needs at this time and reports controlled pain levels. Parker draining. Refusing any turn at this time despite RN education, pt supine with pillow support and heels floated. Plan of care reviewed and white board updated. PICC dressing dated. Fall and safety precautions in place. Call light within reach.

## 2023-01-21 NOTE — PROGRESS NOTES
Contacted Terri with [facility] to update that patient transport has been delayed until [id number] 1//22/23. Phone number to give RN report is [phone]

## 2023-01-21 NOTE — PROGRESS NOTES
Case discussed with Dr Aniket BARNES to DC tomorrow  Prevena + vac placed to be left for 7 days then remove

## 2023-01-21 NOTE — CARE PLAN
The patient is Watcher - Medium risk of patient condition declining or worsening    BP improved with initiation of steroids after low cortisol resulted. Remained asymptomatic all night. Pain adequately controlled with PRN oxy. Wound care completed as ordered.     Shift Goals  Clinical Goals: BP management, pain control, wound care, frequent turns  Patient Goals: discharge 9 am tomorrow  Family Goals: haylee    Progress made toward(s) clinical / shift goals:    Problem: Knowledge Deficit - Standard  Goal: Patient and family/care givers will demonstrate understanding of plan of care, disease process/condition, diagnostic tests and medications  Outcome: Progressing     Problem: Skin Integrity  Goal: Skin integrity is maintained or improved  Outcome: Progressing     Problem: Pain - Standard  Goal: Alleviation of pain or a reduction in pain to the patient’s comfort goal  Outcome: Progressing       Patient is not progressing towards the following goals:

## 2023-01-21 NOTE — PROGRESS NOTES
NOC HOSPITALIST CROSS COVER    Notified by RN regarding hypotension of 89/49.  Patient was treated for hypertension earlier in the shift with IV fluids and she was temporarily responsive however she became hypotensive again orders previously been placed for cortisol which resulted at 3.3.  Patient was started on stress dose steroids IV hydrocortisone 100 mg every 8 hours.      A/P:  #Hypotension  -Cortisol 3.3  -IV hydrocortisone 100 mg every 8 hours  -Blood pressure has since improved        -----------------------------------------------------------------------------------------------------------    Electronically signed by:  PHILL Sandoval PA-C  Hospitalist Services

## 2023-01-21 NOTE — PROCEDURES
Vascular Access Team     Date of Insertion: 1/20/23  Arm Circumference: 29  Internal length: 43  External Length: 0  Vein Occupancy %: 25   Reason for PICC: Abx  Labs: WBC 11.7, , BUN 22, Cr 0.73, GFR 88, INR NA     Consents confirmed, vessel patency confirmed with ultrasound. Risks and benefits of procedure explained to patient and education regarding central line associated bloodstream infections provided. Questions answered.      PICC placed in LUE per licensed provider order with ultrasound guidance.  4 Fr, single lumen PICC placed in basilic vein after 1 attempt(s). 2 mL of 1% lidocaine injected intradermally at the insertion site. A 21 gauge microintroducer needle was visualized entering the vein and modified Seldinger technique was used to obtain access to the vein. 43 cm catheter inserted and brisk blood return was observed from each lumen upon aspiration. Line secured at the 0 cm marker. TCS stylet removed and observed to be fully intact. Each lumen flushed using pulsatile method without resistance with 10 mL 0.9% normal saline. PICC line secured with Biopatch and Tegaderm.     PICC tip placement location is confirmed by nurse to be in the Superior Vena Cava (SVC) utilizing 3CG technology. PICC line is appropriate for use at this time. Patient tolerated procedure well, without complications.  Patient condition relayed to primary RN or ordering physician via this post procedure note in the EMR.      Ultrasound images uploaded to PACS and viewable in the EMR - yes  Ultrasound imaged printed and placed in paper chart - no     BARD Power PICC ref # [id number], Lot # [id number], Expiration Date 11/30/23

## 2023-01-21 NOTE — PROGRESS NOTES
Hospital Medicine Daily Progress Note    Date of Service  1/21/2023    Chief Complaint  Dyana Conway is a 72 y.o. female admitted 1/8/2023 with open distal femur fracture    Hospital Course  Admitted with Right Open distal femur fracture after a fall.  Patient with known history of paraplegia secondary to history of transverse myelitis.  She was started on empiric coverage with IV Vancomycin and Rocephin.  Orthopedic Surgery was consulted on the case.  Patient underwent Wound debridement with multiple soft tissue and bone cultures, Retrograde intramedullary femoral fixation with cement supplementation on 1/9/2023.  Postoperatively she had hypotension and required transfer to the ICU. She was placed on IV steroids.  Wound cultures showed E. coli, Enterococcus, and Finegoldia.  Antibiotics were changed to IV Unasyn.  She was noted to have Acute respiratory failure with hypoxia, also noted to have bilateral pleural effusions and was started on IV Lasix for diuresis.  Ultrasound guided thoracentesis was ordered, but it was too small to be drained safely.    Interval Problem Update  Femur fracture -pain controlled, cultures showed Enterococcus, Finegoldia, E. Coli  Resp failure - O2 2 lpm  Hypotension - sbp 80-96    I have discussed this patient's plan of care and discharge plan at IDT rounds today with Case Management, Nursing, Nursing leadership, and other members of the IDT team.    Consultants/Specialty  critical care, infectious disease, and orthopedics    Code Status  Full Code    Disposition  Patient is not medically cleared for discharge.   Anticipate discharge to to skilled nursing facility.  I have placed the appropriate orders for post-discharge needs.    Review of Systems  Review of Systems   Constitutional:  Positive for malaise/fatigue. Negative for chills, diaphoresis and fever.   HENT:  Positive for hearing loss. Negative for congestion and sore throat.    Eyes:  Negative for blurred vision.    Respiratory:  Negative for cough, shortness of breath and wheezing.    Cardiovascular:  Negative for chest pain, palpitations and leg swelling.   Gastrointestinal:  Negative for abdominal pain, diarrhea, heartburn, nausea and vomiting.   Genitourinary:  Negative for dysuria, flank pain and hematuria.   Musculoskeletal:  Positive for joint pain. Negative for back pain, myalgias and neck pain.   Skin:  Negative for rash.   Neurological:  Positive for focal weakness and weakness. Negative for dizziness, sensory change, speech change and headaches.   Psychiatric/Behavioral:  The patient is not nervous/anxious.       Physical Exam  Temp:  [36.7 °C (98.1 °F)-37.4 °C (99.3 °F)] 36.7 °C (98.1 °F)  Pulse:  [68-76] 68  Resp:  [14-17] 17  BP: ()/(47-60) 96/57  SpO2:  [91 %-94 %] 91 %    Physical Exam  Vitals and nursing note reviewed.   HENT:      Head: Normocephalic and atraumatic.      Nose: No congestion.      Mouth/Throat:      Mouth: Mucous membranes are moist.   Eyes:      Extraocular Movements: Extraocular movements intact.      Conjunctiva/sclera: Conjunctivae normal.   Cardiovascular:      Rate and Rhythm: Normal rate and regular rhythm.   Pulmonary:      Effort: Pulmonary effort is normal.      Breath sounds: Decreased air movement present.   Abdominal:      General: There is no distension.      Tenderness: There is no abdominal tenderness. There is no guarding or rebound.   Musculoskeletal:      Cervical back: No tenderness.      Right lower leg: Edema present.      Left lower leg: Edema present.      Comments: Wound vac at Right knee   Skin:     Comments: Decubitus ulcer at right ischial tuberosity area   Neurological:      Mental Status: She is alert and oriented to person, place, and time.      Cranial Nerves: No cranial nerve deficit.      Motor: Weakness (MMT BUE 5/5, BLE 0-1/5) present.       Fluids    Intake/Output Summary (Last 24 hours) at 1/21/2023 1037  Last data filed at 1/21/2023 0900  Gross per  24 hour   Intake 1675.83 ml   Output 1800 ml   Net -124.17 ml       Laboratory  Recent Labs     01/19/23  0209 01/21/23  0025   WBC 11.7* 9.0   RBC 2.90* 2.77*   HEMOGLOBIN 8.7* 8.3*   HEMATOCRIT 29.0* 27.4*   .0* 98.9*   MCH 30.0 30.0   MCHC 30.0* 30.3*   RDW 61.6* 61.9*   PLATELETCT 421 349   MPV 9.3 9.1     Recent Labs     01/19/23  0209 01/21/23  0900   SODIUM 137 138   POTASSIUM 4.3 4.0   CHLORIDE 104 106   CO2 25 27   GLUCOSE 87 114*   BUN 22 13   CREATININE 0.73 0.49*   CALCIUM 7.8* 7.7*                   Imaging  IR-PICC LINE PLACEMENT W/ GUIDANCE > AGE 5   Final Result                  Ultrasound-guided PICC placement performed by qualified nursing staff as    above.          US-ABDOMEN COMPLETE SURVEY   Final Result         1.  Atherosclerosis   2.  Bilateral pleural effusion   3.  Trace perihepatic ascites      US-CHEST   Final Result      1.  Small right pleural effusion identified too small to safely be aspirated.      2.  Thoracentesis deferred.      CT-CTA CHEST PULMONARY ARTERY W/ RECONS   Final Result      1.  Negative for pulmonary embolism      2.  Moderate-sized right and small-moderate size left pleural effusion      3.  Bilateral atelectasis            EC-ECHOCARDIOGRAM COMPLETE W/O CONT   Final Result      DX-CHEST-PORTABLE (1 VIEW)   Final Result      Small BILATERAL pleural effusions with overlying atelectasis or airspace disease      DX-FEMUR-2+ RIGHT   Final Result      Postoperative change from resection of the distal RIGHT femur with internal fixation.      DX-PORTABLE FLUORO > 1 HOUR   Final Result      Portable fluoroscopy utilized for 1 minute 42 seconds.         INTERPRETING LOCATION: [address] [place] [address], [id number]      DX-FEMUR-2+ RIGHT   Final Result      Portable intraoperative imaging with findings as described above.      CT-FOREIGN FILM CAT SCAN   Final Result      DX-CHEST-PORTABLE (1 VIEW)   Final Result         1. No acute cardiopulmonary abnormalities are identified.       CT-KNEE W/O PLUS RECONS RIGHT   Final Result         1. There is an open wound in the anterior aspect with small amount of gas.      2. Subacute significant displays comminuted fracture of the distal femur with multiple displaced fragments.      3. Significant fluid and heterotopic calcification seen in the lateral aspect of the distal femoral fracture with rarefaction of the bones. This could reflects subacute nature of the fracture with superimposed infection/osteomyelitis given the open    wound. Underlying lytic mass cannot be excluded. MRI with contrast may be helpful for further evaluation.      CT-CSPINE WITHOUT PLUS RECONS   Final Result         1. No acute fracture from C1 through T1 is visualized.         CT-HEAD W/O   Final Result         1. No acute intracranial abnormality. No evidence of acute intracranial hemorrhage or mass lesion.                            Assessment/Plan  * Open fracture of right distal femur (HCC)- (present on admission)  Assessment & Plan  Wound debridement with multiple soft tissue and bone cultures, Retrograde intramedullary femoral fixation with cement supplementation.   1/9/2023  Pain control, wound care  WBAT RLE  ID recommendation - IV Unasyn until 2/20/2023, then Augmentin for antibiotic suppression  PICC line placement 1/20/2023    Septic shock (HCC)- (present on admission)  Assessment & Plan  off steroids    Hypotension- (present on admission)  Assessment & Plan  Likely secondary to Lasix  stop Lasix  Bolus 1 L NS    Hypothyroid- (present on admission)  Assessment & Plan  Synthroid  Repeat TSH on 2/9/2023    Fall- (present on admission)  Assessment & Plan  PT and OT    Paraplegia (HCC)- (present on admission)  Assessment & Plan  Secondary to transverse myelitis  PT and OT    Decubitus ulcer- (present on admission)  Assessment & Plan  Wound care    Pleural effusion  Assessment & Plan  stop Lasix  Thoracenteses deferred as right pleural effusion too small to be safely  drained 1/17/2023    Hypokalemia- (present on admission)  Assessment & Plan  Follow BMP    Acute respiratory failure with hypoxia (HCC)- (present on admission)  Assessment & Plan  Encourage I-S, RT protocol  Stop Lasix    Anemia- (present on admission)  Assessment & Plan  Follow cbc  Fergon    Neurogenic bladder- (present on admission)  Assessment & Plan  Parker catheter in place for urinary retention    Mild protein-calorie malnutrition (HCC)- (present on admission)  Assessment & Plan  Nutrition consult  Supplements         VTE prophylaxis: enoxaparin ppx    I have performed a physical exam and reviewed and updated ROS and Plan today (1/21/2023). In review of yesterday's note (1/20/2023), there are no changes except as documented above.

## 2023-01-21 NOTE — PROGRESS NOTES
2330: Hospitalists Jaime and Sage aware of continued hypotension. 80/48 manual, asymptomatic. Additional bolus ordered and administered. Labs drawn as ordered.     0016: BP improved 100/52      0136: BP 89/49, pt remains asymptomatic. Per hospitalist jaime: will throw in some orders

## 2023-01-21 NOTE — DISCHARGE PLANNING
Care Transition Team Final Discharge Disposition    Actual Discharge Information  Discharge Disposition: D/T to SNF with Medicare cert in anticipation of skilled care (03) [facility]  6th [facility]      0754 -CM notified by Dionte Kline that patient not ready to transport at 0900 via [facility].     0757 -CM placed call to T and spoke to Lani- transportation cancelled for today as they had to call in a crew for this transport. Lani checking on transport time for tomorrow and will call this CM back once confirmed with her supervisor. Dionte updated via [facility].    0833 -CM received return call from Cornelia is rescheduled for [phone] on Sunday 01/22/2023. Dionte updated via [facility].

## 2023-01-22 VITALS
RESPIRATION RATE: 18 BRPM | HEART RATE: 69 BPM | DIASTOLIC BLOOD PRESSURE: 58 MMHG | WEIGHT: 154.76 LBS | BODY MASS INDEX: 24.87 KG/M2 | HEIGHT: 66 IN | TEMPERATURE: 97.9 F | SYSTOLIC BLOOD PRESSURE: 103 MMHG | OXYGEN SATURATION: 93 %

## 2023-01-22 LAB
ERYTHROCYTE [DISTWIDTH] IN BLOOD BY AUTOMATED COUNT: 62.1 FL (ref 35.9–50)
HCT VFR BLD AUTO: 25.7 % (ref 37–47)
HGB BLD-MCNC: 7.8 G/DL (ref 12–16)
MCH RBC QN AUTO: 30.2 PG (ref 27–33)
MCHC RBC AUTO-ENTMCNC: 30.4 G/DL (ref 33.6–35)
MCV RBC AUTO: 99.6 FL (ref 81.4–97.8)
PLATELET # BLD AUTO: 362 K/UL (ref 164–446)
PMV BLD AUTO: 9.4 FL (ref 9–12.9)
RBC # BLD AUTO: 2.58 M/UL (ref 4.2–5.4)
WBC # BLD AUTO: 7.6 K/UL (ref 4.8–10.8)

## 2023-01-22 PROCEDURE — 700105 HCHG RX REV CODE 258: Performed by: FAMILY MEDICINE

## 2023-01-22 PROCEDURE — A9270 NON-COVERED ITEM OR SERVICE: HCPCS | Performed by: INTERNAL MEDICINE

## 2023-01-22 PROCEDURE — A9270 NON-COVERED ITEM OR SERVICE: HCPCS | Performed by: STUDENT IN AN ORGANIZED HEALTH CARE EDUCATION/TRAINING PROGRAM

## 2023-01-22 PROCEDURE — 700105 HCHG RX REV CODE 258: Performed by: INTERNAL MEDICINE

## 2023-01-22 PROCEDURE — 700102 HCHG RX REV CODE 250 W/ 637 OVERRIDE(OP): Performed by: STUDENT IN AN ORGANIZED HEALTH CARE EDUCATION/TRAINING PROGRAM

## 2023-01-22 PROCEDURE — 700102 HCHG RX REV CODE 250 W/ 637 OVERRIDE(OP): Performed by: INTERNAL MEDICINE

## 2023-01-22 PROCEDURE — 700111 HCHG RX REV CODE 636 W/ 250 OVERRIDE (IP): Performed by: INTERNAL MEDICINE

## 2023-01-22 PROCEDURE — A9270 NON-COVERED ITEM OR SERVICE: HCPCS | Performed by: FAMILY MEDICINE

## 2023-01-22 PROCEDURE — 85027 COMPLETE CBC AUTOMATED: CPT

## 2023-01-22 PROCEDURE — 99239 HOSP IP/OBS DSCHRG MGMT >30: CPT | Performed by: FAMILY MEDICINE

## 2023-01-22 PROCEDURE — 700102 HCHG RX REV CODE 250 W/ 637 OVERRIDE(OP): Performed by: FAMILY MEDICINE

## 2023-01-22 RX ADMIN — AMPICILLIN SODIUM AND SULBACTAM SODIUM 3 G: 2; 1 INJECTION, POWDER, FOR SOLUTION INTRAMUSCULAR; INTRAVENOUS at 00:04

## 2023-01-22 RX ADMIN — LEVOTHYROXINE SODIUM 50 MCG: 0.05 TABLET ORAL at 05:37

## 2023-01-22 RX ADMIN — CYANOCOBALAMIN TAB 500 MCG 500 MCG: 500 TAB at 05:37

## 2023-01-22 RX ADMIN — OXYCODONE HYDROCHLORIDE 5 MG: 5 TABLET ORAL at 09:40

## 2023-01-22 RX ADMIN — OXYCODONE HYDROCHLORIDE AND ACETAMINOPHEN 500 MG: 500 TABLET ORAL at 05:37

## 2023-01-22 RX ADMIN — AMPICILLIN SODIUM AND SULBACTAM SODIUM 3 G: 2; 1 INJECTION, POWDER, FOR SOLUTION INTRAMUSCULAR; INTRAVENOUS at 05:43

## 2023-01-22 RX ADMIN — SODIUM HYPOCHLORITE 1 ML: 1.25 SOLUTION TOPICAL at 06:07

## 2023-01-22 RX ADMIN — OXYCODONE HYDROCHLORIDE 5 MG: 5 TABLET ORAL at 01:08

## 2023-01-22 RX ADMIN — SODIUM CHLORIDE: 9 INJECTION, SOLUTION INTRAVENOUS at 03:34

## 2023-01-22 RX ADMIN — OXYCODONE HYDROCHLORIDE 5 MG: 5 TABLET ORAL at 05:40

## 2023-01-22 RX ADMIN — FERROUS GLUCONATE 324 MG: 324 TABLET ORAL at 07:17

## 2023-01-22 RX ADMIN — THERA TABS 1 TABLET: TAB at 05:38

## 2023-01-22 RX ADMIN — OXYBUTYNIN CHLORIDE 10 MG: 10 TABLET, EXTENDED RELEASE ORAL at 05:43

## 2023-01-22 ASSESSMENT — PAIN DESCRIPTION - PAIN TYPE
TYPE: ACUTE PAIN

## 2023-01-22 NOTE — PROGRESS NOTES
Discharge instructions given to patient, patient questions and concerns answered. Patient belongings returned to patient. Patient transported via [facility] to [facility]. Report given to Norma FABIAN with [facility].

## 2023-01-22 NOTE — CARE PLAN
Problem: Knowledge Deficit - Standard  Goal: Patient and family/care givers will demonstrate understanding of plan of care, disease process/condition, diagnostic tests and medications  Outcome: Progressing     Problem: Skin Integrity  Goal: Skin integrity is maintained or improved  Outcome: Progressing     Problem: Pain - Standard  Goal: Alleviation of pain or a reduction in pain to the patient’s comfort goal  Outcome: Progressing   The patient is Watcher - Medium risk of patient condition declining or worsening    Shift Goals  Clinical Goals: pain control, monitor BP, fluid admin, wound care  Patient Goals: pain control, rest  Family Goals: haylee    Progress made toward(s) clinical / shift goals:  pain managed with prescribed medications.. Fluid bolus administered, BP stabilized. Wound care performed.    Patient is not progressing towards the following goals:

## 2023-01-22 NOTE — DISCHARGE PLANNING
[employer] Packet ready. Made RN aware that it needs to be signed by MD and Patient. Sat packet on counter next to nurse's station per Dionte FABIAN.

## 2023-01-22 NOTE — PROGRESS NOTES
Received report from previous shift RN.  Assessment complete.  A&O x 4. Patient calls appropriately.  Patient ambulates x2 assist with FWW.   SpO2 >90% on 2L NC.  Patient denies SOB.  Patient has 5/10 pain. Pain managed per MAR.  Denies N&V. Tolerating regular diet.  R leg dressing with Prevena and Ace wrap, CDI.  + void via holguin catheter, + flatus, last BM 1/21.  Patient pleasant and cooperative with plan of care.  Call light and personal belongings with in reach. Hourly rounding in place. All needs met at this time.

## 2023-01-22 NOTE — CARE PLAN
Problem: Knowledge Deficit - Standard  Goal: Patient and family/care givers will demonstrate understanding of plan of care, disease process/condition, diagnostic tests and medications  1/22/2023 0925 by Dionte Kline R.N.  Outcome: Met  1/22/2023 0909 by Dionte Kline R.N.  Outcome: Progressing     Problem: Skin Integrity  Goal: Skin integrity is maintained or improved  1/22/2023 0925 by Dionte Kline R.N.  Outcome: Met  1/22/2023 0909 by Dionte Kline R.N.  Outcome: Progressing     Problem: Pain - Standard  Goal: Alleviation of pain or a reduction in pain to the patient’s comfort goal  1/22/2023 0925 by Dionte Kline R.N.  Outcome: Met  1/22/2023 0909 by Dionte Kline R.N.  Outcome: Progressing

## 2023-01-22 NOTE — CARE PLAN
The patient is Stable - Low risk of patient condition declining or worsening    Shift Goals  Clinical Goals: Pain control, wound care  Patient Goals: Pain control, rest  Family Goals: N/A    Progress made toward(s) clinical / shift goals:  Pain managed per MAR and rest. Wound care to maintain skin integrity.    Problem: Knowledge Deficit - Standard  Goal: Patient and family/care givers will demonstrate understanding of plan of care, disease process/condition, diagnostic tests and medications  Outcome: Progressing     Problem: Skin Integrity  Goal: Skin integrity is maintained or improved  Outcome: Progressing     Problem: Pain - Standard  Goal: Alleviation of pain or a reduction in pain to the patient’s comfort goal  Outcome: Progressing       Patient is not progressing towards the following goals:

## 2023-01-22 NOTE — PROGRESS NOTES
"Bedside report received.  Assessment complete.  A&O x 4. Patient calls appropriately.  Patient ambulates with two assist pivot to commode / wheelchair. Bed alarm on.   Patient has 4-5/10 pain. Pain managed with prescribed medications.  Denies N&V. Tolerating regular diet.  Surgical dressing CDI. Wound dressing CDI  + void, + flatus, + BM.  Patient denies SOB.  SCD's on.  Patient is pleasant and cooperative with the care plan.  Review plan with of care with patient. Call light and personal belongings within reach. Hourly rounding in place. All needs met at this time.   /58   Pulse 69   Temp 36.6 °C (97.9 °F) (Temporal)   Resp 18   Ht 1.676 m (5' 5.98\")   Wt 70.2 kg (154 lb 12.2 oz)   SpO2 93%   BMI 24.99 kg/m²     "

## 2023-01-22 NOTE — PROGRESS NOTES
"Bedside report received.  Assessment complete.  A&O x 4. Patient calls appropriately.  Patient ambulates with two assist, pivot to wheelchair. Bed alarm on.   Patient has 4-5/10 pain. Pain managed with prescribed medications.  Denies N&V. Tolerating regular diet.  Surgical dressing/wound vac CDI. Wound dressing changed per protocol.  + output in holguin, + flatus, + BM.  Patient denies SOB.  SCD's on.  Patient is pleasant and cooperative with the care plan.  Review plan with of care with patient. Call light and personal belongings within reach. Hourly rounding in place. All needs met at this time.   /59   Pulse 74   Temp 36.3 °C (97.3 °F) (Temporal)   Resp 18   Ht 1.676 m (5' 5.98\")   Wt 70.2 kg (154 lb 12.2 oz)   SpO2 94%   BMI 24.99 kg/m²     "

## 2023-02-01 ENCOUNTER — TELEPHONE (OUTPATIENT)
Dept: INFECTIOUS DISEASES | Facility: MEDICAL CENTER | Age: 73
End: 2023-02-01
Payer: MEDICARE

## 2023-02-02 ENCOUNTER — TELEPHONE (OUTPATIENT)
Dept: INFECTIOUS DISEASES | Facility: MEDICAL CENTER | Age: 73
End: 2023-02-02
Payer: MEDICARE

## 2023-02-03 NOTE — TELEPHONE ENCOUNTER
Patient called today refusing at this time to schedule fv appt even after discharge from facility. [place] Dr's there follow up with her and ABX

## 2023-02-07 LAB
FUNGUS SPEC CULT: NORMAL
FUNGUS SPEC FUNGUS STN: NORMAL
SIGNIFICANT IND 70042: NORMAL
SITE SITE: NORMAL
SOURCE SOURCE: NORMAL

## 2023-02-21 ENCOUNTER — OFFICE VISIT (OUTPATIENT)
Dept: INFECTIOUS DISEASES | Facility: MEDICAL CENTER | Age: 73
End: 2023-02-21
Attending: NURSE PRACTITIONER
Payer: MEDICARE

## 2023-02-21 VITALS
TEMPERATURE: 98.7 F | DIASTOLIC BLOOD PRESSURE: 60 MMHG | BODY MASS INDEX: 20.57 KG/M2 | HEART RATE: 104 BPM | OXYGEN SATURATION: 94 % | SYSTOLIC BLOOD PRESSURE: 94 MMHG | RESPIRATION RATE: 16 BRPM | HEIGHT: 66 IN | WEIGHT: 128 LBS

## 2023-02-21 DIAGNOSIS — M86.251 SUBACUTE OSTEOMYELITIS OF RIGHT FEMUR (HCC): ICD-10-CM

## 2023-02-21 DIAGNOSIS — S72.401E TYPE I OR II OPEN FRACTURE OF DISTAL END OF RIGHT FEMUR WITH ROUTINE HEALING, UNSPECIFIED FRACTURE MORPHOLOGY, SUBSEQUENT ENCOUNTER: ICD-10-CM

## 2023-02-21 DIAGNOSIS — D64.9 ANEMIA, UNSPECIFIED TYPE: ICD-10-CM

## 2023-02-21 DIAGNOSIS — G82.20 PARAPLEGIA (HCC): ICD-10-CM

## 2023-02-21 PROCEDURE — 99213 OFFICE O/P EST LOW 20 MIN: CPT | Performed by: NURSE PRACTITIONER

## 2023-02-21 PROCEDURE — 99211 OFF/OP EST MAY X REQ PHY/QHP: CPT | Performed by: NURSE PRACTITIONER

## 2023-02-21 RX ORDER — AMOXICILLIN AND CLAVULANATE POTASSIUM 875; 125 MG/1; MG/1
1 TABLET, FILM COATED ORAL 2 TIMES DAILY
Qty: 180 TABLET | Refills: 1 | Status: SHIPPED | OUTPATIENT
Start: 2023-02-21 | End: 2023-02-22

## 2023-02-21 ASSESSMENT — ENCOUNTER SYMPTOMS
SPUTUM PRODUCTION: 0
COUGH: 0
PALPITATIONS: 0
FALLS: 1
FOCAL WEAKNESS: 1
DIZZINESS: 0
WHEEZING: 0
BLURRED VISION: 0
SHORTNESS OF BREATH: 0
FEVER: 0
DOUBLE VISION: 0
NAUSEA: 0
NERVOUS/ANXIOUS: 0
HEADACHES: 0
BRUISES/BLEEDS EASILY: 0
WEIGHT LOSS: 0
VOMITING: 0
ABDOMINAL PAIN: 0
CHILLS: 0

## 2023-02-21 ASSESSMENT — FIBROSIS 4 INDEX: FIB4 SCORE: 0.79

## 2023-02-21 NOTE — PROGRESS NOTES
INFECTIOUS  DISEASE  OUTPATIENT CLINIC  NOTE   Subjective   Primary care provider: Pcp Pt States None.     Reason for Follow Up:   Follow-up for   1. Type I or II open fracture of distal end of right femur with routine healing, unspecified fracture morphology, subsequent encounter        2. Paraplegia (HCC)        3. Anemia, unspecified type            HPI: Patient previously seen and treated by ID team as inpatient during hospital admission.   Dyana Conway is a 72 y.o. female with a history of paraplegia, transverse myelitis, anemia, muscle disorder, infected left knee joint, UTI. Patient was admitted on 1/8/2023 for a fall that resulted in a right open distal femur.  Patient went to the OR on 1/9 for wound debridement with tissue and bone cultures, retrograde intramedullary femoral fixation.  Per op notes fragments were removed with no evidence of pus or necrotizing tissue.  Hardware was left in place such as K wires and intramedullary nail placed along with locking screws to help stabilize.  OR cultures were positive from the bone fragments for E. coli, enterococcal faecalis, rare Finegoldia magna.  Per orthopedics no plans to remove the intramedullary nail and they are recommending chronic suppression.  Plan at discharge was  continue Unasyn and given bone involvement, hardware in place and most recent photos showing some wound dehiscence and bleeding will recommend a 6-week IV antibiotic course from date of OR - end 2/20/22. After completing IV course transition to Augmentin 875/125 twice daily for chronic suppression.    If unable to come into [place] may be able to have her oral antibiotics be managed by PCP in [place] but will need to further discuss.  If she is coming in to [place] to see her surgeon then we can try and facilitate our visit to be on the same day.    02/21/23- Today Patient reports feeling well. Pt stating that the wound is healing well.  Denies drainage, pungent odor, redness, pain. Denies  feeling generally ill, fevers/chills, general malaise, headache, n/v/d. Patient being cared for at [facility]. Completed course of IV antibiotics without any reports of side effects.      Current Antimicrobials:   Previous Antimicrobials: Vancomycin, Zosyn, Unasyn    Other Current Medications:  Home Medications    Medication Sig Taking? Last Dose Authorizing Provider   ascorbic acid (VITAMIN C) 500 MG tablet Take 1 Tablet by mouth every day.   Nilo Kapadia M.D.   enoxaparin (LOVENOX) 40 MG/0.4ML Solution Prefilled Syringe inj Inject 40 mg under the skin every day at 6 PM.   Nilo Kapadia M.D.   ferrous gluconate (FERGON) 324 (38 Fe) MG Tab Take 1 Tablet by mouth 2 times a day with meals.   Nilo Kapadia M.D.   multivitamin Tab Take 1 Tablet by mouth every day.   Nilo Kapadia M.D.   amoxicillin-clavulanate (AUGMENTIN) 875-125 MG Tab Take 1 Tablet by mouth 2 times a day for 30 days.   Nilo Kapadia M.D.   levothyroxine (SYNTHROID) 50 MCG Tab Take 50 mcg by mouth every morning on an empty stomach.   Physician Outpatient        PMH:  Past Medical History:   Diagnosis Date    Allergy     Microcytic anemia 1/8/2023    Muscle disorder     Septic joint of left knee joint (HCC) 1/8/2023    Urinary tract infection, site not specified      Past Surgical History:   Procedure Laterality Date    IRRIGATION & DEBRIDEMENT GENERAL Right 1/9/2023    Procedure: IRRIGATION AND DEBRIDEMENT, WOUND;  Surgeon: Handy Chapa M.D.;  Location: SURGERY [facility];  Service: Orthopedics    ORIF, FRACTURE, FEMUR Right 1/9/2023    Procedure: INTRAMEDULLARY NAIL FIXATION OF RIGHT FEMUR  WITH CEMENT SUPPLEMENTATION;  Surgeon: Handy Chapa M.D.;  Location: SURGERY [facility];  Service: Orthopedics    ABDOMINAL EXPLORATION       Family History   Family history unknown: Yes     Social History     Socioeconomic History    Marital status:      Spouse name: Not on file    Number of children: Not on file     Years of education: Not on file    Highest education level: Not on file   Occupational History    Not on file   Tobacco Use    Smoking status: Heavy Smoker     Packs/day: 0.50     Years: 15.00     Pack years: 7.50     Types: Cigarettes    Smokeless tobacco: Not on file   Vaping Use    Vaping Use: Never used   Substance and Sexual Activity    Alcohol use: No    Drug use: Never    Sexual activity: Not on file   Other Topics Concern    Not on file   Social History Narrative    Not on file     Social Determinants of Health     Financial Resource Strain: Not on file   Food Insecurity: Not on file   Transportation Needs: Not on file   Physical Activity: Not on file   Stress: Not on file   Social Connections: Not on file   Intimate Partner Violence: Not on file   Housing Stability: Not on file           Allergies/Intolerances:  Allergies   Allergen Reactions    Doxycycline Rash     median    Macrodantin [Nitrofurantoin] Unspecified     Unknown    Sulfa Drugs Hives     1999-12-04       ROS:   Review of Systems   Constitutional:  Negative for chills, fever, malaise/fatigue and weight loss.   HENT:  Negative for congestion and hearing loss.    Eyes:  Negative for blurred vision and double vision.   Respiratory:  Negative for cough, sputum production, shortness of breath and wheezing.    Cardiovascular:  Negative for chest pain and palpitations.   Gastrointestinal:  Negative for abdominal pain, nausea and vomiting.   Genitourinary:  Positive for dysuria (Chronic, had holguin for 1 month).   Musculoskeletal:  Positive for falls. Negative for joint pain.   Skin:  Negative for itching and rash.   Neurological:  Positive for focal weakness (Chronic LE weakness). Negative for dizziness and headaches.   Endo/Heme/Allergies:  Does not bruise/bleed easily.   Psychiatric/Behavioral:  The patient is not nervous/anxious.     ROS was reviewed and were negative except as above.    Objective    Most Recent Vital Signs:  There were no vitals  taken for this visit.    Physical Exam:  Physical Exam  Vitals and nursing note reviewed.   Constitutional:       General: She is not in acute distress.     Appearance: Normal appearance. She is not ill-appearing.      Comments: Wheel chair bound    HENT:      Head: Normocephalic and atraumatic.      Nose: Nose normal.      Mouth/Throat:      Mouth: Mucous membranes are moist.   Eyes:      Pupils: Pupils are equal, round, and reactive to light.   Cardiovascular:      Rate and Rhythm: Normal rate and regular rhythm.   Pulmonary:      Effort: Pulmonary effort is normal. No respiratory distress.      Breath sounds: Normal breath sounds. No stridor.   Abdominal:      General: There is no distension.      Palpations: Abdomen is soft.   Musculoskeletal:         General: No swelling or tenderness.      Cervical back: Normal range of motion.      Comments: Right Knee brace  Previous surgical wound x 2 right knee with steri strips, No erythema, swelling or warmth    Skin:     General: Skin is warm and dry.      Coloration: Skin is not jaundiced or pale.   Neurological:      Mental Status: She is alert and oriented to person, place, and time.        Pertinent Lab/Imaging Results:  [unfilled]  @CMP@  WBC   Date/Time Value Ref Range Status   01/22/2023 03:38 AM 7.6 4.8 - 10.8 K/uL Final     RBC   Date/Time Value Ref Range Status   01/22/2023 03:38 AM 2.58 (L) 4.20 - 5.40 M/uL Final     Hemoglobin   Date/Time Value Ref Range Status   01/22/2023 03:38 AM 7.8 (L) 12.0 - 16.0 g/dL Final     Hematocrit   Date/Time Value Ref Range Status   01/22/2023 03:38 AM 25.7 (L) 37.0 - 47.0 % Final     MCV   Date/Time Value Ref Range Status   01/22/2023 03:38 AM 99.6 (H) 81.4 - 97.8 fL Final     MCH   Date/Time Value Ref Range Status   01/22/2023 03:38 AM 30.2 27.0 - 33.0 pg Final     MCHC   Date/Time Value Ref Range Status   01/22/2023 03:38 AM 30.4 (L) 33.6 - 35.0 g/dL Final     MPV   Date/Time Value Ref Range Status   01/22/2023 03:38 AM 9.4  9.0 - 12.9 fL Final      Sodium   Date/Time Value Ref Range Status   01/21/2023 09:00  135 - 145 mmol/L Final     Potassium   Date/Time Value Ref Range Status   01/21/2023 09:00 AM 4.0 3.6 - 5.5 mmol/L Final     Chloride   Date/Time Value Ref Range Status   01/21/2023 09:00  96 - 112 mmol/L Final     Co2   Date/Time Value Ref Range Status   01/21/2023 09:00 AM 27 20 - 33 mmol/L Final     Glucose   Date/Time Value Ref Range Status   01/21/2023 09:00  (H) 65 - 99 mg/dL Final     Bun   Date/Time Value Ref Range Status   01/21/2023 09:00 AM 13 8 - 22 mg/dL Final     Creatinine   Date/Time Value Ref Range Status   01/21/2023 09:00 AM 0.49 (L) 0.50 - 1.40 mg/dL Final     Alkaline Phosphatase   Date/Time Value Ref Range Status   01/12/2023 04:30  (H) 30 - 99 U/L Final     AST(SGOT)   Date/Time Value Ref Range Status   01/12/2023 04:30 AM 13 12 - 45 U/L Final     ALT(SGPT)   Date/Time Value Ref Range Status   01/12/2023 04:30 AM 10 2 - 50 U/L Final     Total Bilirubin   Date/Time Value Ref Range Status   01/12/2023 04:30 AM 0.2 0.1 - 1.5 mg/dL Final      No results found for: CPKTOTAL       No results found for: BLOODCULTU, BLDCULT, BCHOLD    No results found for: BLOODCULTU, BLDCULT, BCHOLD     Pertinent Micro:  Results         Procedure Component Value Units Date/Time     CULTURE WOUND W/ GRAM STAIN [[id number]]  (Abnormal) Collected: 01/09/23 1135     Order Status: Completed Specimen: Wound Updated: 01/17/23 1602       Significant Indicator POS       Source WND       Site DISTAL FEMUR #2       Culture Result -       Gram Stain Result Few WBCs.  No organisms seen.          Culture Result Escherichia coli  Light growth  See previous culture for sensitivity report.           Enterococcus faecalis  Rare growth  See previous culture for sensitivity report.       Narrative:       Surgery - swabs received     Anaerobic Culture [[id number]]  (Abnormal) Collected: 01/09/23 1135     Order Status: Completed  Specimen: Wound Updated: 01/17/23 1602       Significant Indicator POS       Source WND       Site DISTAL FEMUR #2       Culture Result -         Finegoldia magna  Rare growth       Narrative:       Surgery - swabs received     Fungal Culture [[id number]] Collected: 01/09/23 1135     Order Status: Completed Specimen: Wound Updated: 01/17/23 1602       Significant Indicator NEG       Source WND       Site DISTAL FEMUR #2       Culture Result No fungal growth to date.       Fungal Smear Results No fungal elements seen.     Narrative:       Surgery - swabs received     Fungal Culture [[id number]] Collected: 01/09/23 1135     Order Status: Completed Specimen: Wound Updated: 01/17/23 1601       Significant Indicator NEG       Source WND       Site DISTAL FEMUR #3       Culture Result No fungal growth to date.       Fungal Smear Results No fungal elements seen.     Narrative:       Surgery - swabs received     CULTURE WOUND W/ GRAM STAIN [[id number]]  (Abnormal) Collected: 01/09/23 1135     Order Status: Completed Specimen: Wound Updated: 01/17/23 1601       Significant Indicator POS       Source WND       Site DISTAL FEMUR #1       Culture Result -       Gram Stain Result Few WBCs.  No organisms seen.          Culture Result Escherichia coli  Rare growth  See previous culture for sensitivity report.           Enterococcus faecalis  Rare growth  See previous culture for sensitivity report.       Narrative:       Surgery - swabs received     Anaerobic Culture [[id number]] Collected: 01/09/23 1135     Order Status: Completed Specimen: Wound Updated: 01/17/23 1601       Significant Indicator NEG       Source WND       Site DISTAL FEMUR #1       Culture Result No Anaerobes isolated.     Narrative:       Surgery - swabs received     Fungal Culture [[id number]] Collected: 01/09/23 1135     Order Status: Completed Specimen: Wound Updated: 01/17/23 1601       Significant Indicator NEG       Source WND       Site DISTAL FEMUR #1        Culture Result No fungal growth to date.       Fungal Smear Results No fungal elements seen.     Narrative:       Surgery - swabs received     CULTURE WOUND W/ GRAM STAIN [[id number]]  (Abnormal) Collected: 01/09/23 1135     Order Status: Completed Specimen: Wound Updated: 01/17/23 1601       Significant Indicator POS       Source WND       Site DISTAL FEMUR #3       Culture Result -       Gram Stain Result Few WBCs.  No organisms seen.          Culture Result Escherichia coli  Rare growth  See previous culture for sensitivity report.           Enterococcus faecalis  Rare growth  See previous culture for sensitivity report.       Narrative:       Surgery - swabs received     Anaerobic Culture [[id number]]  (Abnormal) Collected: 01/09/23 1135     Order Status: Completed Specimen: Wound Updated: 01/17/23 1601       Significant Indicator POS       Source WND       Site DISTAL FEMUR #3       Culture Result -         Finegoldia magna  Rare growth       Narrative:       Surgery - swabs received     Anaerobic Culture [[id number]] Collected: 01/09/23 1135     Order Status: Completed Specimen: Tissue Updated: 01/17/23 1601       Significant Indicator NEG       Source TISS       Site DISTAL FEMUR TISSUE       Culture Result No Anaerobes isolated.     Narrative:       Surgery Specimen     Fungal Culture [[id number]] Collected: 01/09/23 1135     Order Status: Completed Specimen: Tissue Updated: 01/17/23 1601       Significant Indicator NEG       Source TISS       Site DISTAL FEMUR TISSUE       Culture Result No fungal growth to date.       Fungal Smear Results No fungal elements seen.     Narrative:       Surgery Specimen     AFB Culture [[id number]] Collected: 01/09/23 1135     Order Status: Completed Specimen: Tissue Updated: 01/17/23 1601       Significant Indicator NEG       Source TISS       Site DISTAL FEMUR TISSUE       Culture Result Culture in progress.       AFB Smear Results No acid fast bacilli seen.     Narrative:        Surgery Specimen     CULTURE TISSUE W/ GRM STAIN [[id number]]  (Abnormal)  (Susceptibility) Collected: 01/09/23 1135     Order Status: Completed Specimen: Tissue Updated: 01/17/23 1601       Significant Indicator POS       Source TISS       Site DISTAL FEMUR TISSUE       Culture Result Growth noted after further incubation, see below for  organism identification.         Gram Stain Result Rare WBCs.  No organisms seen.          Culture Result Escherichia coli  Rare growth  Isolated from enrichment broth only, please correlate with  clinical condition.       Narrative:       Surgery Specimen     Susceptibility         Escherichia coli (1)         Antibiotic Interpretation Microscan   Method Status     Ampicillin Sensitive <=8 mcg/mL JON Final     Ceftriaxone Sensitive <=1 mcg/mL JON Final     Cefazolin Sensitive <=2 mcg/mL JON Final     Ciprofloxacin Sensitive <=0.25 mcg/mL JON Final     Cefepime Sensitive <=2 mcg/mL JON Final     Cefuroxime Sensitive <=4 mcg/mL JON Final     Ampicillin/sulbactam Sensitive <=4/2 mcg/mL JON Final     Ertapenem Sensitive <=0.5 mcg/mL JON Final     Tobramycin Sensitive <=2 mcg/mL JON Final     Gentamicin Sensitive <=2 mcg/mL JON Final     Minocycline Sensitive <=4 mcg/mL JON Final     Moxifloxacin Sensitive <=2 mcg/mL JON Final     Pip/Tazobactam Sensitive <=8 mcg/mL JON Final     Trimeth/Sulfa Sensitive <=0.5/9.5 mcg/mL JON Final     Tigecycline Sensitive <=2 mcg/mL JON Final                            FLUID CULTURE W/GRAM STAIN [[id number]]       Order Status: Canceled Specimen: Body Fluid from Thoracentesis Fluid       AFB CULTURE [[id number]]       Order Status: Canceled Specimen: Body Fluid from Thoracentesis Fluid       FUNGAL CULTURE [[id number]]       Order Status: Canceled Specimen: Body Fluid from Thoracentesis Fluid       BLOOD CULTURE [[id number]] Collected: 01/08/23 2149     Order Status: Completed Specimen: Blood from Peripheral Updated: 01/13/23 2300       Significant  "Indicator NEG       Source BLD       Site PERIPHERAL       Culture Result No growth after 5 days of incubation.     Narrative:       Per Hospital Policy: Only change Specimen Src: to \"Line\" if  specified by physician order.  Left Hand     BLOOD CULTURE [[id number]] Collected: 01/08/23 2201     Order Status: Completed Specimen: Blood from Peripheral Updated: 01/13/23 2300       Significant Indicator NEG       Source BLD       Site PERIPHERAL       Culture Result No growth after 5 days of incubation.     Narrative:       Per Hospital Policy: Only change Specimen Src: to \"Line\" if  specified by physician order.  Right Hand              Impression/Assessment      1. Type I or II open fracture of distal end of right femur with routine healing, unspecified fracture morphology, subsequent encounter        2. Paraplegia (HCC)        3. Anemia, unspecified type            Dyana Conway is a 72 y.o. female with a history of paraplegia, transverse myelitis, anemia, muscle disorder, infected left knee joint, UTI. Patient was admitted on 1/8/2023 for a fall that resulted in a right open distal femur.  Patient went to the OR on 1/9 for wound debridement with tissue and bone cultures, retrograde intramedullary femoral fixation.  Per op notes fragments were removed with no evidence of pus or necrotizing tissue.  Hardware was left in place such as K wires and intramedullary nail placed along with locking screws to help stabilize.  OR cultures were positive from the bone fragments for E. coli, enterococcal faecalis, rare Finegoldia magna.  Per orthopedics no plans to remove the intramedullary nail and they are recommending chronic suppression.  Plan at discharge was  continue Unasyn and given bone involvement, hardware in place and most recent photos showing some wound dehiscence and bleeding will recommend a 6-week IV antibiotic course from date of OR - end 2/20/22. After completing IV course transition to Augmentin 875/125 " twice daily for chronic suppression.    PICC line removed in office, completed course of IV antibiotics. PICC line intact with no signs of fracture. Right arm dressed and education provided. Right knee site x2 surgical wounds healing well without erythema, swelling or warmth. Patient transitioning to Augmentin 875/125 twice daily for chronic suppression.    PLAN:   - Augmentin 875/125 twice daily for chronic suppression  - CBC/ CMP in 1 month   - Patient to follow up with orthopedic team in 1 week for wound evaluation, patient reports possible hardware removal. After follow up visit with orthopedic MD we will discuss long term plan for Antibiotics hardware removal vs retention   - Diet education/ discussed    - Outside labs reviewed, anemia resolved   - Chronic paraplegia due to transverse myelitis, patient at care facility performing PT       Return visit: 1 month. Follow up with primary care physician for chronic medical problems      I have performed a physical exam,  updated ROS and plan today. I have reviewed previous images, labs, and provider notes.      PAULY Latham.    All Patients should seek medical re-evaluation or report to the ER for new, increasing or worsening symptoms. In some circumstances medical conditions can change from the initial evaluation and may require emergent medical re-evaluation. This includes but is not limited to chest pain, shortness of breath, atypical abdominal pain, atypical headache, ALOC, fever >101, low blood pressure, high respiratory rate (above 30), low oxygen saturation (below 90%), acute delirium, abnormal bleeding, inability to tolerate any intake, weakness on one side of the body, any worsened or concerning conditions.    Please note that this dictation was created using voice recognition software. I have worked with technical experts from [employer] to optimize the interface.  I have made every reasonable attempt to correct obvious errors, but there may  be errors of grammar and possibly content that I did not discover before finalizing the note.

## 2023-02-22 ENCOUNTER — TELEPHONE (OUTPATIENT)
Dept: INFECTIOUS DISEASES | Facility: MEDICAL CENTER | Age: 73
End: 2023-02-22
Payer: MEDICARE

## 2023-02-22 DIAGNOSIS — M86.251 SUBACUTE OSTEOMYELITIS OF RIGHT FEMUR (HCC): ICD-10-CM

## 2023-02-22 LAB
MYCOBACTERIUM SPEC CULT: NORMAL
RHODAMINE-AURAMINE STN SPEC: NORMAL
SIGNIFICANT IND 70042: NORMAL
SITE SITE: NORMAL
SOURCE SOURCE: NORMAL

## 2023-02-22 RX ORDER — AMOXICILLIN AND CLAVULANATE POTASSIUM 500; 125 MG/1; MG/1
1 TABLET, FILM COATED ORAL 2 TIMES DAILY
Qty: 90 TABLET | Refills: 1 | Status: SHIPPED | OUTPATIENT
Start: 2023-02-22

## 2023-02-22 NOTE — TELEPHONE ENCOUNTER
Will decrease dose of Augmentin to 500/125 mg twice daily due to patient complaints of headache.  Patient weighs 128 lbs.  We will trial this dose.  Follow-up in 1 month or as needed

## 2023-02-22 NOTE — TELEPHONE ENCOUNTER
Received a phone call from  facility patient will be discharging later today and they wish to be proactive. Patient took 2tabs of Augmentin and reported a headache if medication is not changed to a different abx she will not continue to take this medication when she gets home.

## 2023-03-03 ENCOUNTER — TELEPHONE (OUTPATIENT)
Dept: INFECTIOUS DISEASES | Facility: MEDICAL CENTER | Age: 73
End: 2023-03-03
Payer: MEDICARE

## 2023-03-03 NOTE — TELEPHONE ENCOUNTER
----- Message from MIGUEL Latham sent at 3/2/2023 12:03 PM PST -----  I do not see any notes and from her orthopedic surgeon with plans to remove hardware vs leave in place which we might need to chronically suppressed with antibiotics.  If we can we get the notes from her orthopedic surgeon so I know what the plan is?  Im ok if she wants her PCP to start managing her antibiotics but we need to know orthopedics plans are before we hand her over completely    You can you forward her PCP our ID notes so he/she is up to date   ----- Message -----  From: Luis Ramsey Ass't  Sent: 3/2/2023  10:55 AM PST  To: MIGUEL Latham    Patient has cancelled her follow up appt 3/21 she is unable to travel. Patient wanted me to schedule her 3/13 when she in town for ortho appt as well. That's a Monday and we do not have clinic. She would like for her PCP to take over the ABX and I asked her to arrange that with PCP. Anything else from our end?

## 2023-03-03 NOTE — TELEPHONE ENCOUNTER
Spoke with patient. We will review ortho notes after patients visit 3/13. I faxed over our notes to pts PCP Reggie Spaulding PA-C [phone] where patient has appt to establish care on 3/31. Patient will discuss then if PCP can take over the ABX RX. Patient asked what if PCP does not wish to take over RX, I advised she would need to continue to follow up with us and do labs depending on how long she will require abx. Patient verbalized understanding and had no further questions at this time.

## 2023-10-24 NOTE — DIETARY
"Nutrition services: Day 2 of admit.  Dyana Conway is a 72 y.o. female with admitting DX of Sepsis.  Consult received for malnutrition. Pt with high risk diagnosis of mild protein calorie malnutrition.    Assessment:  Height: 167.6 cm (5' 6\")  Weight: 70.2 kg (154 lb 12.2 oz)  Body mass index is 24.98 kg/m²., BMI classification: Normal  Diet/Intake: Regular    Evaluation:   Pt with diagnosis of mild protein calorie malnutrition. Criteria per MD note was low albumin. Albumin is not part of ASPEN criteria for malnutrition. No CRP available to assess.  Pt currently receiving a Regular diet. Recorded PO intake at dinner last night was 50-75%. Pt states she ate most of breakfast today. 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  Pt denies unintentional weight loss. She does report weight loss from 200 lb 3-4 years ago, but is currently at a weight that she is comfortable at.  Pt appears well nourished.  Noted pt with chronic wound to buttocks. Wound team consult pending, will await wound staging to make recommendations if appropriate. RD will monitor per dept policy.    Malnutrition Risk: Patient does not meet ASPEN criteria for malnutrition at this time based on RD assessment of energy intake, weight loss, body fat/muscle mass losses and fluid accumulation.    Recommendations/Plan:   Encourage continued intake of meals >50%.  Document intake of all meals as % taken in ADL's to provide interdisciplinary communication across all shifts.   Monitor weight.  Nutrition rep will continue to see patient for ongoing meal and snack preferences.   RD to monitor per department policy.          "
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 RD verified that pt's PO intake has been adequate w/ nursing. Per nurse, she ate half of her lunch. She does not care for bread so she ate the meat out of her sandwich. Limited documentation of PO intake in Epic but it appears PO was % prior to today also.     MD agreed to order for MVI+minerals and vitamin C supplementation for healing.     RD will follow weekly or PRN.   
Quality 226: Preventive Care And Screening: Tobacco Use: Screening And Cessation Intervention: Patient screened for tobacco use and is an ex/non-smoker
Detail Level: Detailed

## 2023-11-13 NOTE — PROGRESS NOTES
Critical Care Progress Note    Date of admission  1/8/2023    Chief Complaint  72 y.o. female admitted 1/8/2023 with hypotension after intramedullary femoral fixation and wound debridement of a 2-week old open type I distal femur fracture after ground-level fall approximately 2 weeks prior to admission.    Hospital Course  72 y.o. female, paraplegic/wheelchair-bound secondary to transverse myelitis, coccygeal decubitus ulcer who presented 1/8/2023 swollen knee after falling in the kitchen on hardwood floor approximately 2 weeks prior to admission.  She has paraplegia and diminished sensation.  On presentation she was noted to have open wound with purulent discharge.  There was concern for possible osteomyelitis.  Today she underwent I&D of the wound and intramedullary nail fixation of the right femur.  Wound and bone cultures were obtained and sent.  Postoperatively she was hypotensive in PACU.  She received 2.5 L of crystalloid intraoperatively and additional liter postoperatively.  She has been started on IV Jack-Synephrine and will be transferred to ICU.  She is currently receiving ceftriaxone and vancomycin.  We will escalate to Zosyn-vancomycin and de-escalate with culture availability.  BC x2 from 1/8 as well as initial wound culture have been no growth today.    1/10 - Hgb 6.0, transfuse 1 unit PRBCs, follow-up iron studies, cortisol 2.2, add stress steroids, wean off Jack-Synephrine  1/11 -improved, titrating off Jack-Synephrine, continue stress steroids, de-escalate antibiotics to Unasyn per ID recommendation    Interval Problem Update  Reviewed last 24 hour events:  A/O x 4  SR  RLE edema  Increased FiO2 requirement overnight, now 10 L oxime mask  CXR pending  Jack-Synephrine off   On stress dose hydrocortisone  Max 98.2  WBC 9.5 after 1 U PRBC  I/O = 2784/680  Lovenox ppx  Vanco/zosyn  E.coli, E.faecalis  Stop vanco, consult ID  Replace clarence HARTLEYs        Review of Systems  Review of Systems   Unable to perform  msg   ROS: Acuity of condition      Vital Signs for last 24 hours   Temp:  [36.6 °C (97.9 °F)-37 °C (98.6 °F)] 36.7 °C (98.1 °F)  Pulse:  [60-89] 83  Resp:  [13-29] 19  BP: ()/(47-77) 118/72  SpO2:  [87 %-99 %] 92 %    Hemodynamic parameters for last 24 hours       Respiratory Information for the last 24 hours       Physical Exam   Physical Exam  Vitals and nursing note reviewed.   Constitutional:       Comments: Somnolent but arouses, follows commands, oriented   HENT:      Head: Normocephalic.      Nose: Nose normal.      Mouth/Throat:      Mouth: Mucous membranes are moist.   Eyes:      Pupils: Pupils are equal, round, and reactive to light.   Cardiovascular:      Rate and Rhythm: Normal rate and regular rhythm.      Pulses: Normal pulses.   Pulmonary:      Effort: Pulmonary effort is normal. No respiratory distress.      Breath sounds: No wheezing.   Abdominal:      General: There is no distension.      Palpations: Abdomen is soft.   Musculoskeletal:         General: No swelling.      Cervical back: Neck supple.   Skin:     Comments: Right buttock wound, see photos, reviewed   Neurological:      Comments: Awake, follows commands, weak lower extremity movement       Medications  Current Facility-Administered Medications   Medication Dose Route Frequency Provider Last Rate Last Admin    MD Alert...ICU Electrolyte Replacement per Pharmacy   Other PHARMACY TO DOSE Patric Bansal M.D.        enoxaparin (Lovenox) inj 40 mg  40 mg Subcutaneous DAILY AT 1800 Patric Bansal M.D.   40 mg at 01/10/23 1813    hydrocortisone sodium succinate PF (Solu-CORTEF) 100 MG injection 50 mg  50 mg Intravenous Q6HRS Patric Bansal M.D.   50 mg at 01/11/23 0555    vancomycin (VANCOCIN) 750 mg in  mL IVPB  750 mg Intravenous Q12HR Patric Bansal M.D. 125 mL/hr at 01/11/23 0650 750 mg at 01/11/23 0650    oxyCODONE immediate-release (ROXICODONE) tablet 5 mg  5 mg Oral Q4HRS PRN Matthew Ye M.D.   5 mg at 01/11/23 0550     NS infusion   Intravenous Continuous Florian Pablo M.D. 83 mL/hr at 01/11/23 0207 New Bag at 01/11/23 0207    phenylephrine 40 mg/250 mL NS premix  0-5 mcg/kg/min (Ideal) Intravenous Continuous Patric Bansal M.D. 5.6 mL/hr at 01/11/23 0049 0.25 mcg/kg/min at 01/11/23 0049    vasopressin (Vasostrict) 20 Units in  mL Infusion  0.03 Units/min Intravenous Continuous Patric Bansal M.D.   Dose not Required at 01/09/23 1730    dakins 0.125% (1/4 strength) topical soln   Topical BID Patric Bansal M.D.   1 mL at 01/11/23 0554    piperacillin-tazobactam (Zosyn) 4.5 g in  mL IVPB  4.5 g Intravenous Q8HRS Patric Bansal M.D. 25 mL/hr at 01/11/23 0520 4.5 g at 01/11/23 0520    senna-docusate (PERICOLACE or SENOKOT S) 8.6-50 MG per tablet 2 Tablet  2 Tablet Oral BID Gabi Byrne M.D.   2 Tablet at 01/11/23 0554    And    polyethylene glycol/lytes (MIRALAX) PACKET 1 Packet  1 Packet Oral QDAY PRN Gabi Byrne M.D.        And    magnesium hydroxide (MILK OF MAGNESIA) suspension 30 mL  30 mL Oral QDAY PRN Gabi Byrne M.D.        And    bisacodyl (DULCOLAX) suppository 10 mg  10 mg Rectal QDAY PRN Gabi Byrne M.D.        lactated ringers infusion (BOLUS): BMI less than or equal to 30  30 mL/kg Intravenous Once PRN Gabi Byrne M.D.        acetaminophen (Tylenol) tablet 650 mg  650 mg Oral Q6HRS PRN Gabi Byrne M.D. MD Alert...Vancomycin per Pharmacy   Other PHARMACY TO DOSE Gabi Byrne M.D.        cyanocobalamin (VITAMIN B-12) tablet 500 mcg  500 mcg Oral DAILY Gabi Byrne M.D.   500 mcg at 01/11/23 0554    levothyroxine (SYNTHROID) tablet 50 mcg  50 mcg Oral AM ES Gabi Byrne M.D.   50 mcg at 01/11/23 0554    oxybutynin SR (DITROPAN-XL) tablet 10 mg  10 mg Oral BID Gabi Byrne M.D.   10 mg at 01/11/23 0554       Fluids    Intake/Output Summary (Last 24 hours) at 1/11/2023 0719  Last data filed at 1/11/2023 0650  Gross per 24 hour   Intake 2509.65 ml   Output 680 ml   Net  1829.65 ml         Laboratory          Recent Labs     01/09/23 1531 01/09/23  1720 01/10/23  0436 01/11/23  0525   SODIUM 139  --  137 137   POTASSIUM 4.2  --  4.3 3.8   CHLORIDE 110  --  108 110   CO2 20  --  22 21   BUN 14  --  11 11   CREATININE 0.44*  --  0.45* 0.41*   MAGNESIUM  --  1.7 1.8 2.1   PHOSPHORUS  --  2.4*  --  2.0*   CALCIUM 7.4*  --  7.5* 7.4*       Recent Labs     01/09/23  0311 01/09/23  1531 01/10/23  0436 01/11/23  0525   ALTSGPT 7  --  9 11   ASTSGOT 13  --  13 14   ALKPHOSPHAT 111*  --  101* 116*   TBILIRUBIN 0.3  --  0.3 0.3   GLUCOSE 99 118* 115* 119*       Recent Labs     01/09/23  0311 01/09/23  1531 01/10/23  0436 01/10/23  1551 01/11/23  0525   WBC 12.4* 24.3* 13.5* 10.7 9.5   NEUTSPOLYS 81.80* 95.60* 83.30*  --  89.30*   LYMPHOCYTES 6.10* 1.90* 7.80*  --  5.70*   MONOCYTES 5.20 1.40 8.10  --  4.30   EOSINOPHILS 1.70 0.00 0.00  --  0.00   BASOPHILS 0.00 0.20 0.10  --  0.20   ASTSGOT 13  --  13  --  14   ALTSGPT 7  --  9  --  11   ALKPHOSPHAT 111*  --  101*  --  116*   TBILIRUBIN 0.3  --  0.3  --  0.3       Recent Labs     01/08/23  2016 01/09/23  0311 01/09/23  0949 01/09/23  1531 01/10/23  0436 01/10/23  1551 01/11/23  0525   RBC 2.65*   < >  --    < > 1.96* 2.50* 2.56*   HEMOGLOBIN 8.3*   < >  --    < > 6.0* 7.8* 7.9*   HEMATOCRIT 27.5*   < >  --    < > 20.2* 25.0* 25.3*   PLATELETCT 460*   < >  --    < > 521* 398 387   PROTHROMBTM 13.3  --   --   --   --   --   --    APTT 32.0  --   --   --   --   --   --    INR 1.02  --   --   --   --   --   --    IRON  --   --  20*  --  14*  --   --    TOTIRONBC  --   --  218*  --  195*  --   --     < > = values in this interval not displayed.         Imaging  X-Ray:  I have personally reviewed the images and compared with prior images.    Assessment/Plan  Sepsis  -Secondary to open distal right femur fracture   -Wound debridement with intramedullary femoral fixation and multiple culture sent 1/9  -Received crystalloid volume resuscitation,  lactate 1.7, sepsis order set per hospitalist  -Initial blood cultures, wound cultures no growth today, follow-up repeat tissue/wound cultures  -Started on Jack-Synephrine in PACU for hypotension postoperatively  -Continue titrated vasopressors, consider stress dose hydrocortisone  -Continue broad-spectrum antibiotics with Zosyn/vancomycin, discuss with ID  -Wound care  -Cortisol quite low at 2.2, starting stress dose hydrocortisone today    1/11-clinically improving, continue stress steroid, de-escalate ABX, 6 weeks total antibiotics, out of ICU 7, PT/OT/wound care     Decubitus ulcer  -Right buttock/ischium, clean appearing   -Wound care     Paraplegia, secondary to transverse myelitis  -wheelchair-bound at baseline     Anemia  -Without evidence of acute blood loss  -Follow-up iron studies/anemia work-up     Goals of care/advance care planning  -Discussed with patient further, family, palliative care  -Currently full CODE STATUS       VTE:  Lovenox  Ulcer: Not Indicated  Lines: None    I have performed a physical exam and reviewed and updated ROS and Plan today (1/11/2023). In review of yesterday's note (1/10/2023), there are no changes except as documented above.     Discussed patient condition and risk of morbidity and/or mortality with RN, RT, Pharmacy, and QA team  The patient remains critically ill.  Critical care time = 33 minutes in directly providing and coordinating critical care and extensive data review.  No time overlap and excludes procedures.

## 2025-01-01 ENCOUNTER — APPOINTMENT (OUTPATIENT)
Dept: RADIOLOGY | Facility: MEDICAL CENTER | Age: 75
DRG: 559 | End: 2025-01-01
Attending: STUDENT IN AN ORGANIZED HEALTH CARE EDUCATION/TRAINING PROGRAM
Payer: MEDICARE

## 2025-01-01 ENCOUNTER — APPOINTMENT (OUTPATIENT)
Dept: RADIOLOGY | Facility: MEDICAL CENTER | Age: 75
DRG: 559 | End: 2025-01-01
Attending: EMERGENCY MEDICINE
Payer: MEDICARE

## 2025-01-01 ENCOUNTER — HOSPITAL ENCOUNTER (INPATIENT)
Facility: MEDICAL CENTER | Age: 75
LOS: 1 days | DRG: 559 | End: 2025-06-04
Attending: STUDENT IN AN ORGANIZED HEALTH CARE EDUCATION/TRAINING PROGRAM | Admitting: INTERNAL MEDICINE
Payer: MEDICARE

## 2025-01-01 VITALS
HEART RATE: 92 BPM | DIASTOLIC BLOOD PRESSURE: 77 MMHG | OXYGEN SATURATION: 88 % | HEIGHT: 66 IN | SYSTOLIC BLOOD PRESSURE: 100 MMHG | RESPIRATION RATE: 26 BRPM | BODY MASS INDEX: 22.57 KG/M2 | WEIGHT: 140.43 LBS

## 2025-01-01 DIAGNOSIS — T14.8XXA INFECTED WOUND: ICD-10-CM

## 2025-01-01 DIAGNOSIS — A41.9 SEPTIC SHOCK (HCC): Primary | ICD-10-CM

## 2025-01-01 DIAGNOSIS — L08.9 INFECTED WOUND: ICD-10-CM

## 2025-01-01 DIAGNOSIS — G93.40 ACUTE ENCEPHALOPATHY: ICD-10-CM

## 2025-01-01 DIAGNOSIS — R65.21 SEPTIC SHOCK (HCC): Primary | ICD-10-CM

## 2025-01-01 DIAGNOSIS — R41.82 ALTERED MENTAL STATUS, UNSPECIFIED ALTERED MENTAL STATUS TYPE: ICD-10-CM

## 2025-01-01 DIAGNOSIS — J96.01 ACUTE HYPOXIC RESPIRATORY FAILURE (HCC): ICD-10-CM

## 2025-01-01 LAB
ALBUMIN SERPL BCP-MCNC: 2.1 G/DL (ref 3.2–4.9)
ALBUMIN/GLOB SERPL: 0.8 G/DL
ALP SERPL-CCNC: 139 U/L (ref 30–99)
ALT SERPL-CCNC: <5 U/L (ref 2–50)
ANION GAP SERPL CALC-SCNC: 15 MMOL/L (ref 7–16)
ANION GAP SERPL CALC-SCNC: 20 MMOL/L (ref 7–16)
ANISOCYTOSIS BLD QL SMEAR: ABNORMAL
APPEARANCE UR: CLEAR
APTT PPP: 47.5 SEC (ref 24.7–36)
ARTERIAL PATENCY WRIST A: ABNORMAL
AST SERPL-CCNC: 12 U/L (ref 12–45)
BACTERIA #/AREA URNS HPF: ABNORMAL /HPF
BASE EXCESS BLDA CALC-SCNC: -13 MMOL/L (ref -4–3)
BASE EXCESS BLDA CALC-SCNC: -17 MMOL/L (ref -4–3)
BASE EXCESS BLDMV CALC-SCNC: -19 MMOL/L
BASOPHILS # BLD AUTO: 0 % (ref 0–1.8)
BASOPHILS # BLD: 0 K/UL (ref 0–0.12)
BILIRUB SERPL-MCNC: <0.2 MG/DL (ref 0.1–1.5)
BILIRUB UR QL STRIP.AUTO: NEGATIVE
BODY TEMPERATURE: 32.5 CENTIGRADE
BODY TEMPERATURE: ABNORMAL DEGREES
BODY TEMPERATURE: ABNORMAL DEGREES
BREATHS SETTING VENT: 26
BREATHS SETTING VENT: 26
BUN SERPL-MCNC: 16 MG/DL (ref 8–22)
BUN SERPL-MCNC: 18 MG/DL (ref 8–22)
BURR CELLS BLD QL SMEAR: NORMAL
CA-I SERPL-SCNC: 1.1 MMOL/L (ref 1.1–1.3)
CALCIUM ALBUM COR SERPL-MCNC: 9.5 MG/DL (ref 8.5–10.5)
CALCIUM SERPL-MCNC: 6.8 MG/DL (ref 8.5–10.5)
CALCIUM SERPL-MCNC: 8 MG/DL (ref 8.5–10.5)
CASTS URNS QL MICRO: ABNORMAL /LPF (ref 0–2)
CHLORIDE SERPL-SCNC: 100 MMOL/L (ref 96–112)
CHLORIDE SERPL-SCNC: 101 MMOL/L (ref 96–112)
CO2 BLDA-SCNC: 14 MMOL/L (ref 32–48)
CO2 BLDA-SCNC: 9 MMOL/L (ref 32–48)
CO2 SERPL-SCNC: 12 MMOL/L (ref 20–33)
CO2 SERPL-SCNC: 6 MMOL/L (ref 20–33)
COLOR UR: YELLOW
CREAT SERPL-MCNC: 1.03 MG/DL (ref 0.5–1.4)
CREAT SERPL-MCNC: 1.07 MG/DL (ref 0.5–1.4)
DELSYS IDSYS: ABNORMAL
DELSYS IDSYS: ABNORMAL
EKG IMPRESSION: NORMAL
END TIDAL CARBON DIOXIDE IECO2: 12 MMHG
EOSINOPHIL # BLD AUTO: 0 K/UL (ref 0–0.51)
EOSINOPHIL NFR BLD: 0 % (ref 0–6.9)
EPITHELIAL CELLS 1715: ABNORMAL /HPF (ref 0–5)
ERYTHROCYTE [DISTWIDTH] IN BLOOD BY AUTOMATED COUNT: 68.4 FL (ref 35.9–50)
FLUAV RNA SPEC QL NAA+PROBE: NEGATIVE
FLUBV RNA SPEC QL NAA+PROBE: NEGATIVE
GFR SERPLBLD CREATININE-BSD FMLA CKD-EPI: 55 ML/MIN/1.73 M 2
GFR SERPLBLD CREATININE-BSD FMLA CKD-EPI: 57 ML/MIN/1.73 M 2
GLOBULIN SER CALC-MCNC: 2.7 G/DL (ref 1.9–3.5)
GLUCOSE BLD STRIP.AUTO-MCNC: 141 MG/DL (ref 65–99)
GLUCOSE BLD STRIP.AUTO-MCNC: 234 MG/DL (ref 65–99)
GLUCOSE BLD STRIP.AUTO-MCNC: 235 MG/DL (ref 65–99)
GLUCOSE SERPL-MCNC: 212 MG/DL (ref 65–99)
GLUCOSE SERPL-MCNC: 374 MG/DL (ref 65–99)
GLUCOSE UR STRIP.AUTO-MCNC: NEGATIVE MG/DL
GRAM STN SPEC: NORMAL
GRAM STN SPEC: NORMAL
HCO3 BLDA-SCNC: 13.2 MMOL/L (ref 21–28)
HCO3 BLDA-SCNC: 8.3 MMOL/L (ref 21–28)
HCO3 BLDMV-SCNC: 8 MMOL/L
HCT VFR BLD AUTO: 27.6 % (ref 37–47)
HGB BLD-MCNC: 7.6 G/DL (ref 12–16)
HOROWITZ INDEX BLDA+IHG-RTO: 508 MM[HG]
HOROWITZ INDEX BLDA+IHG-RTO: 733 MM[HG]
HYALINE CAST   1831: PRESENT /LPF
INR PPP: 2.05 (ref 0.87–1.13)
KETONES UR STRIP.AUTO-MCNC: NEGATIVE MG/DL
LACTATE BLD-SCNC: 4 MMOL/L (ref 0.5–2)
LACTATE BLD-SCNC: 8.6 MMOL/L (ref 0.5–2)
LACTATE SERPL-SCNC: 4.9 MMOL/L (ref 0.5–2)
LACTATE SERPL-SCNC: 5.9 MMOL/L (ref 0.5–2)
LACTATE SERPL-SCNC: 7.2 MMOL/L (ref 0.5–2)
LEUKOCYTE ESTERASE UR QL STRIP.AUTO: ABNORMAL
LYMPHOCYTES # BLD AUTO: 0 K/UL (ref 1–4.8)
LYMPHOCYTES NFR BLD: 0 % (ref 22–41)
MACROCYTES BLD QL SMEAR: ABNORMAL
MAGNESIUM SERPL-MCNC: 2.1 MG/DL (ref 1.5–2.5)
MANUAL DIFF BLD: NORMAL
MCH RBC QN AUTO: 25 PG (ref 27–33)
MCHC RBC AUTO-ENTMCNC: 27.5 G/DL (ref 32.2–35.5)
MCV RBC AUTO: 90.8 FL (ref 81.4–97.8)
METAMYELOCYTES NFR BLD MANUAL: 0.9 %
MICRO URNS: ABNORMAL
MICROCYTES BLD QL SMEAR: ABNORMAL
MODE IMODE: ABNORMAL
MODE IMODE: ABNORMAL
MONOCYTES # BLD AUTO: 0.4 K/UL (ref 0–0.85)
MONOCYTES NFR BLD AUTO: 1.7 % (ref 0–13.4)
MORPHOLOGY BLD-IMP: NORMAL
MYELOCYTES NFR BLD MANUAL: 2.5 %
NEUTROPHILS # BLD AUTO: 21.73 K/UL (ref 1.82–7.42)
NEUTROPHILS NFR BLD: 90.7 % (ref 44–72)
NEUTS BAND NFR BLD MANUAL: 4.2 % (ref 0–10)
NITRITE UR QL STRIP.AUTO: NEGATIVE
NRBC # BLD AUTO: 0.11 K/UL
NRBC BLD-RTO: 0.5 /100 WBC (ref 0–0.2)
NT-PROBNP SERPL IA-MCNC: 1560 PG/ML (ref 0–125)
O2/TOTAL GAS SETTING VFR VENT: 40 %
O2/TOTAL GAS SETTING VFR VENT: 50 %
PCO2 BLDA: 16.5 MMHG (ref 32–48)
PCO2 BLDA: 30.2 MMHG (ref 32–48)
PCO2 BLDMV: 23.8 MMHG
PCO2 TEMP ADJ BLDA: 13.5 MMHG (ref 32–48)
PCO2 TEMP ADJ BLDA: 28.7 MMHG (ref 32–48)
PCO2 TEMP ADJ BLDMV: 19.6 MMHG
PEEP END EXPIRATORY PRESSURE IPEEP: 8 CMH20
PEEP END EXPIRATORY PRESSURE IPEEP: 8 CMH20
PH BLDA: 7.25 [PH] (ref 7.35–7.45)
PH BLDA: 7.31 [PH] (ref 7.35–7.45)
PH BLDMV: 7.13 [PH]
PH TEMP ADJ BLDA: 7.26 [PH] (ref 7.35–7.45)
PH TEMP ADJ BLDA: 7.37 [PH] (ref 7.35–7.45)
PH TEMP ADJ BLDMV: 7.19 [PH]
PH UR STRIP.AUTO: 7.5 [PH] (ref 5–8)
PHOSPHATE SERPL-MCNC: 3.1 MG/DL (ref 2.5–4.5)
PLATELET # BLD AUTO: 172 K/UL (ref 164–446)
PLATELET BLD QL SMEAR: NORMAL
PMV BLD AUTO: 9.3 FL (ref 9–12.9)
PO2 BLDA: 254 MMHG (ref 83–108)
PO2 BLDA: 293 MMHG (ref 83–108)
PO2 BLDMV: 43.9 MMHG
PO2 TEMP ADJ BLDA: 249 MMHG (ref 83–108)
PO2 TEMP ADJ BLDA: 272 MMHG (ref 83–108)
PO2 TEMP ADJ BLDMV: 32 MMHG
POIKILOCYTOSIS BLD QL SMEAR: NORMAL
POTASSIUM SERPL-SCNC: 3.3 MMOL/L (ref 3.6–5.5)
POTASSIUM SERPL-SCNC: 4.2 MMOL/L (ref 3.6–5.5)
PROCALCITONIN SERPL-MCNC: 0.2 NG/ML
PROT SERPL-MCNC: 4.8 G/DL (ref 6–8.2)
PROT UR QL STRIP: 100 MG/DL
PROTHROMBIN TIME: 23.2 SEC (ref 12–14.6)
RBC # BLD AUTO: 3.04 M/UL (ref 4.2–5.4)
RBC # URNS HPF: ABNORMAL /HPF (ref 0–2)
RBC BLD AUTO: PRESENT
RBC UR QL AUTO: ABNORMAL
RSV RNA SPEC QL NAA+PROBE: NEGATIVE
SAO2 % BLDA: 100 % (ref 93–99)
SAO2 % BLDA: 100 % (ref 93–99)
SAO2 % BLDMV: 32 %
SARS-COV-2 RNA RESP QL NAA+PROBE: NOTDETECTED
SCCMEC + MECA PNL NOSE NAA+PROBE: NEGATIVE
SIGNIFICANT IND 70042: NORMAL
SIGNIFICANT IND 70042: NORMAL
SITE SITE: NORMAL
SITE SITE: NORMAL
SODIUM SERPL-SCNC: 127 MMOL/L (ref 135–145)
SODIUM SERPL-SCNC: 127 MMOL/L (ref 135–145)
SOURCE SOURCE: NORMAL
SOURCE SOURCE: NORMAL
SP GR UR STRIP.AUTO: 1.01
SPECIMEN DRAWN FROM PATIENT: ABNORMAL
SPECIMEN DRAWN FROM PATIENT: ABNORMAL
TIDAL VOLUME IVT: 360 ML
TIDAL VOLUME IVT: 360 ML
TROPONIN T SERPL-MCNC: 36 NG/L (ref 6–19)
UFH PPP CHRO-ACNC: <0.1 IU/ML
UROBILINOGEN UR STRIP.AUTO-MCNC: 0.2 EU/DL
WBC # BLD AUTO: 22.9 K/UL (ref 4.8–10.8)
WBC #/AREA URNS HPF: >100 /HPF

## 2025-01-01 PROCEDURE — 94799 UNLISTED PULMONARY SVC/PX: CPT

## 2025-01-01 PROCEDURE — 700105 HCHG RX REV CODE 258: Performed by: EMERGENCY MEDICINE

## 2025-01-01 PROCEDURE — 700111 HCHG RX REV CODE 636 W/ 250 OVERRIDE (IP)

## 2025-01-01 PROCEDURE — 80053 COMPREHEN METABOLIC PANEL: CPT

## 2025-01-01 PROCEDURE — 700111 HCHG RX REV CODE 636 W/ 250 OVERRIDE (IP): Mod: JZ | Performed by: INTERNAL MEDICINE

## 2025-01-01 PROCEDURE — 81001 URINALYSIS AUTO W/SCOPE: CPT

## 2025-01-01 PROCEDURE — 700101 HCHG RX REV CODE 250: Performed by: EMERGENCY MEDICINE

## 2025-01-01 PROCEDURE — 93005 ELECTROCARDIOGRAM TRACING: CPT | Mod: TC | Performed by: STUDENT IN AN ORGANIZED HEALTH CARE EDUCATION/TRAINING PROGRAM

## 2025-01-01 PROCEDURE — 94003 VENT MGMT INPAT SUBQ DAY: CPT

## 2025-01-01 PROCEDURE — 700111 HCHG RX REV CODE 636 W/ 250 OVERRIDE (IP): Mod: JZ

## 2025-01-01 PROCEDURE — 36556 INSERT NON-TUNNEL CV CATH: CPT

## 2025-01-01 PROCEDURE — 87205 SMEAR GRAM STAIN: CPT

## 2025-01-01 PROCEDURE — 83605 ASSAY OF LACTIC ACID: CPT

## 2025-01-01 PROCEDURE — 700117 HCHG RX CONTRAST REV CODE 255: Performed by: STUDENT IN AN ORGANIZED HEALTH CARE EDUCATION/TRAINING PROGRAM

## 2025-01-01 PROCEDURE — 700102 HCHG RX REV CODE 250 W/ 637 OVERRIDE(OP): Performed by: EMERGENCY MEDICINE

## 2025-01-01 PROCEDURE — 87070 CULTURE OTHR SPECIMN AEROBIC: CPT

## 2025-01-01 PROCEDURE — 99291 CRITICAL CARE FIRST HOUR: CPT

## 2025-01-01 PROCEDURE — 73560 X-RAY EXAM OF KNEE 1 OR 2: CPT | Mod: RT

## 2025-01-01 PROCEDURE — 700111 HCHG RX REV CODE 636 W/ 250 OVERRIDE (IP): Mod: JZ | Performed by: EMERGENCY MEDICINE

## 2025-01-01 PROCEDURE — 96368 THER/DIAG CONCURRENT INF: CPT

## 2025-01-01 PROCEDURE — 84484 ASSAY OF TROPONIN QUANT: CPT

## 2025-01-01 PROCEDURE — 71045 X-RAY EXAM CHEST 1 VIEW: CPT

## 2025-01-01 PROCEDURE — 700105 HCHG RX REV CODE 258: Performed by: INTERNAL MEDICINE

## 2025-01-01 PROCEDURE — 82330 ASSAY OF CALCIUM: CPT

## 2025-01-01 PROCEDURE — 700102 HCHG RX REV CODE 250 W/ 637 OVERRIDE(OP)

## 2025-01-01 PROCEDURE — 84145 PROCALCITONIN (PCT): CPT

## 2025-01-01 PROCEDURE — 31500 INSERT EMERGENCY AIRWAY: CPT

## 2025-01-01 PROCEDURE — 700101 HCHG RX REV CODE 250: Performed by: STUDENT IN AN ORGANIZED HEALTH CARE EDUCATION/TRAINING PROGRAM

## 2025-01-01 PROCEDURE — 80048 BASIC METABOLIC PNL TOTAL CA: CPT

## 2025-01-01 PROCEDURE — 94002 VENT MGMT INPAT INIT DAY: CPT

## 2025-01-01 PROCEDURE — 99223 1ST HOSP IP/OBS HIGH 75: CPT | Mod: 57 | Performed by: ORTHOPAEDIC SURGERY

## 2025-01-01 PROCEDURE — 85730 THROMBOPLASTIN TIME PARTIAL: CPT

## 2025-01-01 PROCEDURE — 85007 BL SMEAR W/DIFF WBC COUNT: CPT

## 2025-01-01 PROCEDURE — 87077 CULTURE AEROBIC IDENTIFY: CPT

## 2025-01-01 PROCEDURE — C1751 CATH, INF, PER/CENT/MIDLINE: HCPCS

## 2025-01-01 PROCEDURE — 03HY32Z INSERTION OF MONITORING DEVICE INTO UPPER ARTERY, PERCUTANEOUS APPROACH: ICD-10-PCS | Performed by: INTERNAL MEDICINE

## 2025-01-01 PROCEDURE — 02HV33Z INSERTION OF INFUSION DEVICE INTO SUPERIOR VENA CAVA, PERCUTANEOUS APPROACH: ICD-10-PCS | Performed by: STUDENT IN AN ORGANIZED HEALTH CARE EDUCATION/TRAINING PROGRAM

## 2025-01-01 PROCEDURE — 96366 THER/PROPH/DIAG IV INF ADDON: CPT

## 2025-01-01 PROCEDURE — 37799 UNLISTED PX VASCULAR SURGERY: CPT

## 2025-01-01 PROCEDURE — 82962 GLUCOSE BLOOD TEST: CPT

## 2025-01-01 PROCEDURE — 73701 CT LOWER EXTREMITY W/DYE: CPT | Mod: RT

## 2025-01-01 PROCEDURE — 5A1935Z RESPIRATORY VENTILATION, LESS THAN 24 CONSECUTIVE HOURS: ICD-10-PCS | Performed by: STUDENT IN AN ORGANIZED HEALTH CARE EDUCATION/TRAINING PROGRAM

## 2025-01-01 PROCEDURE — 770022 HCHG ROOM/CARE - ICU (200)

## 2025-01-01 PROCEDURE — 36415 COLL VENOUS BLD VENIPUNCTURE: CPT

## 2025-01-01 PROCEDURE — 85027 COMPLETE CBC AUTOMATED: CPT

## 2025-01-01 PROCEDURE — 99291 CRITICAL CARE FIRST HOUR: CPT | Mod: 25 | Performed by: INTERNAL MEDICINE

## 2025-01-01 PROCEDURE — 85610 PROTHROMBIN TIME: CPT

## 2025-01-01 PROCEDURE — 700111 HCHG RX REV CODE 636 W/ 250 OVERRIDE (IP): Performed by: STUDENT IN AN ORGANIZED HEALTH CARE EDUCATION/TRAINING PROGRAM

## 2025-01-01 PROCEDURE — 83880 ASSAY OF NATRIURETIC PEPTIDE: CPT

## 2025-01-01 PROCEDURE — 82803 BLOOD GASES ANY COMBINATION: CPT

## 2025-01-01 PROCEDURE — 71260 CT THORAX DX C+: CPT

## 2025-01-01 PROCEDURE — 93010 ELECTROCARDIOGRAM REPORT: CPT | Performed by: INTERNAL MEDICINE

## 2025-01-01 PROCEDURE — A9270 NON-COVERED ITEM OR SERVICE: HCPCS | Performed by: EMERGENCY MEDICINE

## 2025-01-01 PROCEDURE — 70450 CT HEAD/BRAIN W/O DYE: CPT

## 2025-01-01 PROCEDURE — 87040 BLOOD CULTURE FOR BACTERIA: CPT

## 2025-01-01 PROCEDURE — 99292 CRITICAL CARE ADDL 30 MIN: CPT | Performed by: EMERGENCY MEDICINE

## 2025-01-01 PROCEDURE — 87086 URINE CULTURE/COLONY COUNT: CPT

## 2025-01-01 PROCEDURE — 87641 MR-STAPH DNA AMP PROBE: CPT

## 2025-01-01 PROCEDURE — 36600 WITHDRAWAL OF ARTERIAL BLOOD: CPT

## 2025-01-01 PROCEDURE — 96375 TX/PRO/DX INJ NEW DRUG ADDON: CPT

## 2025-01-01 PROCEDURE — 36620 INSERTION CATHETER ARTERY: CPT | Performed by: INTERNAL MEDICINE

## 2025-01-01 PROCEDURE — 83735 ASSAY OF MAGNESIUM: CPT

## 2025-01-01 PROCEDURE — 85520 HEPARIN ASSAY: CPT

## 2025-01-01 PROCEDURE — 700105 HCHG RX REV CODE 258: Performed by: STUDENT IN AN ORGANIZED HEALTH CARE EDUCATION/TRAINING PROGRAM

## 2025-01-01 PROCEDURE — 0241U HCHG SARS-COV-2 COVID-19 NFCT DS RESP RNA 4 TRGT ED POC: CPT

## 2025-01-01 PROCEDURE — A9270 NON-COVERED ITEM OR SERVICE: HCPCS | Performed by: INTERNAL MEDICINE

## 2025-01-01 PROCEDURE — 700102 HCHG RX REV CODE 250 W/ 637 OVERRIDE(OP): Performed by: INTERNAL MEDICINE

## 2025-01-01 PROCEDURE — 700111 HCHG RX REV CODE 636 W/ 250 OVERRIDE (IP): Performed by: EMERGENCY MEDICINE

## 2025-01-01 PROCEDURE — 96365 THER/PROPH/DIAG IV INF INIT: CPT

## 2025-01-01 PROCEDURE — 84100 ASSAY OF PHOSPHORUS: CPT

## 2025-01-01 RX ORDER — BISACODYL 10 MG
10 SUPPOSITORY, RECTAL RECTAL
Status: DISCONTINUED | OUTPATIENT
Start: 2025-01-01 | End: 2025-01-01

## 2025-01-01 RX ORDER — EPINEPHRINE HCL IN 0.9 % NACL 4MG/250ML
0-.5 PLASTIC BAG, INJECTION (ML) INTRAVENOUS CONTINUOUS
Status: DISCONTINUED | OUTPATIENT
Start: 2025-01-01 | End: 2025-01-01 | Stop reason: HOSPADM

## 2025-01-01 RX ORDER — POTASSIUM CHLORIDE 29.8 MG/ML
40 INJECTION INTRAVENOUS ONCE
Status: COMPLETED | OUTPATIENT
Start: 2025-01-01 | End: 2025-01-01

## 2025-01-01 RX ORDER — MIDAZOLAM HYDROCHLORIDE 1 MG/ML
5-10 INJECTION INTRAMUSCULAR; INTRAVENOUS
Status: DISCONTINUED | OUTPATIENT
Start: 2025-01-01 | End: 2025-01-01 | Stop reason: HOSPADM

## 2025-01-01 RX ORDER — HEPARIN SODIUM 1000 [USP'U]/ML
40 INJECTION, SOLUTION INTRAVENOUS; SUBCUTANEOUS PRN
Status: DISCONTINUED | OUTPATIENT
Start: 2025-01-01 | End: 2025-01-01 | Stop reason: HOSPADM

## 2025-01-01 RX ORDER — INDOMETHACIN 25 MG/1
CAPSULE ORAL
Status: COMPLETED
Start: 2025-01-01 | End: 2025-01-01

## 2025-01-01 RX ORDER — HEPARIN SODIUM 1000 [USP'U]/ML
80 INJECTION, SOLUTION INTRAVENOUS; SUBCUTANEOUS ONCE
Status: COMPLETED | OUTPATIENT
Start: 2025-01-01 | End: 2025-01-01

## 2025-01-01 RX ORDER — HYDROMORPHONE HYDROCHLORIDE 2 MG/ML
2-4 INJECTION, SOLUTION INTRAMUSCULAR; INTRAVENOUS; SUBCUTANEOUS
Status: DISCONTINUED | OUTPATIENT
Start: 2025-01-01 | End: 2025-01-01 | Stop reason: HOSPADM

## 2025-01-01 RX ORDER — SODIUM BICARBONATE IN D5W 150/1000ML
PLASTIC BAG, INJECTION (ML) INTRAVENOUS CONTINUOUS
Status: DISPENSED | OUTPATIENT
Start: 2025-01-01 | End: 2025-01-01

## 2025-01-01 RX ORDER — ENOXAPARIN SODIUM 100 MG/ML
40 INJECTION SUBCUTANEOUS DAILY
Status: DISCONTINUED | OUTPATIENT
Start: 2025-01-01 | End: 2025-01-01

## 2025-01-01 RX ORDER — LEVOTHYROXINE SODIUM 50 UG/1
50 TABLET ORAL
Status: DISCONTINUED | OUTPATIENT
Start: 2025-01-01 | End: 2025-01-01

## 2025-01-01 RX ORDER — SODIUM CHLORIDE 9 MG/ML
INJECTION, SOLUTION INTRAVENOUS CONTINUOUS
Status: DISCONTINUED | OUTPATIENT
Start: 2025-01-01 | End: 2025-01-01

## 2025-01-01 RX ORDER — INSULIN LISPRO 100 [IU]/ML
1-6 INJECTION, SOLUTION INTRAVENOUS; SUBCUTANEOUS EVERY 6 HOURS
Status: DISCONTINUED | OUTPATIENT
Start: 2025-01-01 | End: 2025-01-01

## 2025-01-01 RX ORDER — NOREPINEPHRINE BITARTRATE 0.03 MG/ML
0-1 INJECTION, SOLUTION INTRAVENOUS CONTINUOUS
Status: DISCONTINUED | OUTPATIENT
Start: 2025-01-01 | End: 2025-01-01

## 2025-01-01 RX ORDER — SODIUM CHLORIDE, SODIUM LACTATE, POTASSIUM CHLORIDE, AND CALCIUM CHLORIDE .6; .31; .03; .02 G/100ML; G/100ML; G/100ML; G/100ML
30 INJECTION, SOLUTION INTRAVENOUS ONCE
Status: COMPLETED | OUTPATIENT
Start: 2025-01-01 | End: 2025-01-01

## 2025-01-01 RX ORDER — EPINEPHRINE HCL IN 0.9 % NACL 4MG/250ML
0-.5 PLASTIC BAG, INJECTION (ML) INTRAVENOUS CONTINUOUS
Status: DISCONTINUED | OUTPATIENT
Start: 2025-01-01 | End: 2025-01-01

## 2025-01-01 RX ORDER — HYDROCORTISONE SODIUM SUCCINATE 100 MG/2ML
100 INJECTION INTRAMUSCULAR; INTRAVENOUS EVERY 8 HOURS
Status: DISCONTINUED | OUTPATIENT
Start: 2025-01-01 | End: 2025-01-01

## 2025-01-01 RX ORDER — LINEZOLID 2 MG/ML
600 INJECTION, SOLUTION INTRAVENOUS EVERY 12 HOURS
Status: DISCONTINUED | OUTPATIENT
Start: 2025-01-01 | End: 2025-01-01

## 2025-01-01 RX ORDER — POLYETHYLENE GLYCOL 3350 17 G/17G
1 POWDER, FOR SOLUTION ORAL
Status: DISCONTINUED | OUTPATIENT
Start: 2025-01-01 | End: 2025-01-01

## 2025-01-01 RX ORDER — SODIUM CHLORIDE, SODIUM LACTATE, POTASSIUM CHLORIDE, AND CALCIUM CHLORIDE .6; .31; .03; .02 G/100ML; G/100ML; G/100ML; G/100ML
1000 INJECTION, SOLUTION INTRAVENOUS ONCE
Status: COMPLETED | OUTPATIENT
Start: 2025-01-01 | End: 2025-01-01

## 2025-01-01 RX ORDER — HYDROCORTISONE SODIUM SUCCINATE 100 MG/2ML
100 INJECTION INTRAMUSCULAR; INTRAVENOUS ONCE
Status: COMPLETED | OUTPATIENT
Start: 2025-01-01 | End: 2025-01-01

## 2025-01-01 RX ORDER — PHENYLEPHRINE HCL IN 0.9% NACL 1 MG/10 ML
100-300 SYRINGE (ML) INTRAVENOUS
Status: ACTIVE | OUTPATIENT
Start: 2025-01-01 | End: 2025-01-01

## 2025-01-01 RX ORDER — SODIUM HYPOCHLORITE 1.25 MG/ML
SOLUTION TOPICAL 2 TIMES DAILY
Status: DISCONTINUED | OUTPATIENT
Start: 2025-01-01 | End: 2025-01-01 | Stop reason: HOSPADM

## 2025-01-01 RX ORDER — FAMOTIDINE 20 MG/1
20 TABLET, FILM COATED ORAL EVERY 12 HOURS
Status: DISCONTINUED | OUTPATIENT
Start: 2025-01-01 | End: 2025-01-01

## 2025-01-01 RX ORDER — ATROPINE SULFATE 10 MG/ML
2 SOLUTION/ DROPS OPHTHALMIC EVERY 4 HOURS PRN
Status: DISCONTINUED | OUTPATIENT
Start: 2025-01-01 | End: 2025-01-01 | Stop reason: HOSPADM

## 2025-01-01 RX ORDER — AMOXICILLIN 250 MG
2 CAPSULE ORAL 2 TIMES DAILY
Status: DISCONTINUED | OUTPATIENT
Start: 2025-01-01 | End: 2025-01-01

## 2025-01-01 RX ORDER — DEXMEDETOMIDINE HYDROCHLORIDE 4 UG/ML
0-1.5 INJECTION, SOLUTION INTRAVENOUS CONTINUOUS
Status: DISCONTINUED | OUTPATIENT
Start: 2025-01-01 | End: 2025-01-01

## 2025-01-01 RX ORDER — DEXTROSE MONOHYDRATE 25 G/50ML
25 INJECTION, SOLUTION INTRAVENOUS
Status: DISCONTINUED | OUTPATIENT
Start: 2025-01-01 | End: 2025-01-01

## 2025-01-01 RX ORDER — HEPARIN SODIUM 5000 [USP'U]/100ML
0-30 INJECTION, SOLUTION INTRAVENOUS CONTINUOUS
Status: DISCONTINUED | OUTPATIENT
Start: 2025-01-01 | End: 2025-01-01 | Stop reason: HOSPADM

## 2025-01-01 RX ORDER — INDOMETHACIN 25 MG/1
50 CAPSULE ORAL ONCE
Status: COMPLETED | OUTPATIENT
Start: 2025-01-01 | End: 2025-01-01

## 2025-01-01 RX ORDER — THIAMINE HYDROCHLORIDE 100 MG/ML
100 INJECTION, SOLUTION INTRAMUSCULAR; INTRAVENOUS DAILY
Status: DISCONTINUED | OUTPATIENT
Start: 2025-01-01 | End: 2025-01-01

## 2025-01-01 RX ADMIN — IOHEXOL 80 ML: 350 INJECTION, SOLUTION INTRAVENOUS at 03:00

## 2025-01-01 RX ADMIN — PHENYLEPHRINE HYDROCHLORIDE 0.25 MCG/KG/MIN: 100 INJECTION INTRAVENOUS at 12:22

## 2025-01-01 RX ADMIN — SODIUM CHLORIDE, SODIUM LACTATE, POTASSIUM CHLORIDE, AND CALCIUM CHLORIDE 1000 ML: .6; .31; .03; .02 INJECTION, SOLUTION INTRAVENOUS at 06:08

## 2025-01-01 RX ADMIN — EPINEPHRINE 0.1 MCG/KG/MIN: 1 INJECTION INTRAMUSCULAR; INTRAVENOUS; SUBCUTANEOUS at 08:39

## 2025-01-01 RX ADMIN — SODIUM BICARBONATE 150 MEQ: 84 INJECTION, SOLUTION INTRAVENOUS at 09:06

## 2025-01-01 RX ADMIN — LINEZOLID 600 MG: 600 INJECTION, SOLUTION INTRAVENOUS at 06:27

## 2025-01-01 RX ADMIN — SODIUM CHLORIDE: 9 INJECTION, SOLUTION INTRAVENOUS at 05:31

## 2025-01-01 RX ADMIN — HEPARIN SODIUM 18 UNITS/KG/HR: 5000 INJECTION, SOLUTION INTRAVENOUS at 10:21

## 2025-01-01 RX ADMIN — POTASSIUM CHLORIDE 40 MEQ: 29.8 INJECTION, SOLUTION INTRAVENOUS at 08:14

## 2025-01-01 RX ADMIN — VANCOMYCIN HYDROCHLORIDE 750 MG: 5 INJECTION, POWDER, LYOPHILIZED, FOR SOLUTION INTRAVENOUS at 04:01

## 2025-01-01 RX ADMIN — MIDAZOLAM HYDROCHLORIDE 10 MG: 1 INJECTION, SOLUTION INTRAMUSCULAR; INTRAVENOUS at 14:19

## 2025-01-01 RX ADMIN — SODIUM BICARBONATE 50 MEQ: 84 INJECTION INTRAVENOUS at 12:09

## 2025-01-01 RX ADMIN — SODIUM CHLORIDE: 9 INJECTION, SOLUTION INTRAVENOUS at 04:00

## 2025-01-01 RX ADMIN — HYDROMORPHONE HYDROCHLORIDE 2 MG: 2 INJECTION INTRAMUSCULAR; INTRAVENOUS; SUBCUTANEOUS at 13:38

## 2025-01-01 RX ADMIN — LEVOTHYROXINE SODIUM 50 MCG: 0.05 TABLET ORAL at 08:37

## 2025-01-01 RX ADMIN — HYDROCORTISONE SODIUM SUCCINATE 100 MG: 100 INJECTION, POWDER, FOR SOLUTION INTRAMUSCULAR; INTRAVENOUS at 02:19

## 2025-01-01 RX ADMIN — THIAMINE HYDROCHLORIDE 100 MG: 100 INJECTION, SOLUTION INTRAMUSCULAR; INTRAVENOUS at 08:38

## 2025-01-01 RX ADMIN — THERA TABS 1 TABLET: TAB at 08:37

## 2025-01-01 RX ADMIN — HYDROMORPHONE HYDROCHLORIDE 4 MG: 2 INJECTION INTRAMUSCULAR; INTRAVENOUS; SUBCUTANEOUS at 14:20

## 2025-01-01 RX ADMIN — DOCUSATE SODIUM 50 MG AND SENNOSIDES 8.6 MG 2 TABLET: 8.6; 5 TABLET, FILM COATED ORAL at 08:37

## 2025-01-01 RX ADMIN — MIDAZOLAM HYDROCHLORIDE 5 MG: 1 INJECTION, SOLUTION INTRAMUSCULAR; INTRAVENOUS at 13:34

## 2025-01-01 RX ADMIN — EPINEPHRINE 0.35 MCG/KG/MIN: 1 INJECTION INTRAMUSCULAR; INTRAVENOUS; SUBCUTANEOUS at 10:22

## 2025-01-01 RX ADMIN — VASOPRESSIN 0.03 UNITS/MIN: 20 INJECTION INTRAVENOUS at 05:44

## 2025-01-01 RX ADMIN — EPINEPHRINE 0.5 MCG/KG/MIN: 1 INJECTION INTRAMUSCULAR; INTRAVENOUS; SUBCUTANEOUS at 12:16

## 2025-01-01 RX ADMIN — VASOPRESSIN 0.06 UNITS/MIN: 20 INJECTION INTRAVENOUS at 11:36

## 2025-01-01 RX ADMIN — NOREPINEPHRINE BITARTRATE 0.6 MCG/KG/MIN: 1 INJECTION, SOLUTION, CONCENTRATE INTRAVENOUS at 04:10

## 2025-01-01 RX ADMIN — SODIUM CHLORIDE, POTASSIUM CHLORIDE, SODIUM LACTATE AND CALCIUM CHLORIDE 2100 ML: 600; 310; 30; 20 INJECTION, SOLUTION INTRAVENOUS at 01:00

## 2025-01-01 RX ADMIN — SODIUM CHLORIDE, POTASSIUM CHLORIDE, SODIUM LACTATE AND CALCIUM CHLORIDE 1000 ML: 600; 310; 30; 20 INJECTION, SOLUTION INTRAVENOUS at 05:22

## 2025-01-01 RX ADMIN — HYDROCORTISONE SODIUM SUCCINATE 100 MG: 100 INJECTION, POWDER, FOR SOLUTION INTRAMUSCULAR; INTRAVENOUS at 08:37

## 2025-01-01 RX ADMIN — INSULIN LISPRO 2 UNITS: 100 INJECTION, SOLUTION INTRAVENOUS; SUBCUTANEOUS at 05:56

## 2025-01-01 RX ADMIN — NOREPINEPHRINE BITARTRATE 1 MCG/KG/MIN: 1 INJECTION, SOLUTION, CONCENTRATE INTRAVENOUS at 08:39

## 2025-01-01 RX ADMIN — NOREPINEPHRINE BITARTRATE 1 MCG/KG/MIN: 1 INJECTION, SOLUTION, CONCENTRATE INTRAVENOUS at 09:58

## 2025-01-01 RX ADMIN — SODIUM BICARBONATE: 84 INJECTION, SOLUTION INTRAVENOUS at 06:05

## 2025-01-01 RX ADMIN — ENOXAPARIN SODIUM 40 MG: 100 INJECTION SUBCUTANEOUS at 05:25

## 2025-01-01 RX ADMIN — PIPERACILLIN AND TAZOBACTAM 4.5 G: 4; .5 INJECTION, POWDER, FOR SOLUTION INTRAVENOUS at 02:40

## 2025-01-01 RX ADMIN — EPINEPHRINE 0.14 MCG/KG/MIN: 1 INJECTION INTRAMUSCULAR; INTRAVENOUS; SUBCUTANEOUS at 00:59

## 2025-01-01 RX ADMIN — INDOMETHACIN 50 MEQ: 25 CAPSULE ORAL at 12:09

## 2025-01-01 RX ADMIN — PIPERACILLIN AND TAZOBACTAM 4.5 G: 4; .5 INJECTION, POWDER, FOR SOLUTION INTRAVENOUS at 05:31

## 2025-01-01 RX ADMIN — HEPARIN SODIUM 5100 UNITS: 1000 INJECTION, SOLUTION INTRAVENOUS; SUBCUTANEOUS at 10:13

## 2025-01-01 RX ADMIN — NOREPINEPHRINE BITARTRATE 0.6 MCG/KG/MIN: 1 INJECTION, SOLUTION, CONCENTRATE INTRAVENOUS at 01:04

## 2025-01-01 RX ADMIN — FAMOTIDINE 20 MG: 20 TABLET, FILM COATED ORAL at 08:36

## 2025-01-01 ASSESSMENT — PAIN DESCRIPTION - PAIN TYPE
TYPE: ACUTE PAIN

## 2025-01-01 ASSESSMENT — FIBROSIS 4 INDEX: FIB4 SCORE: 2.4

## 2025-06-04 PROBLEM — L89.44 PRESSURE INJURY OF CONTIGUOUS REGION INVOLVING BACK AND BUTTOCK, STAGE 4 (HCC): Status: ACTIVE | Noted: 2023-01-17

## 2025-06-04 PROBLEM — E87.1 HYPONATREMIA: Status: ACTIVE | Noted: 2025-01-01

## 2025-06-04 PROBLEM — Z71.89 ADVANCED CARE PLANNING/COUNSELING DISCUSSION: Status: ACTIVE | Noted: 2025-01-01

## 2025-06-04 PROBLEM — L97.519 ULCER OF BOTH FEET (HCC): Status: ACTIVE | Noted: 2025-01-01

## 2025-06-04 PROBLEM — L97.529 ULCER OF BOTH FEET (HCC): Status: ACTIVE | Noted: 2025-01-01

## 2025-06-04 PROBLEM — I82.90 ACUTE DEEP VEIN THROMBOSIS (DVT) OF NON-EXTREMITY VEIN: Status: ACTIVE | Noted: 2025-01-01

## 2025-06-04 PROBLEM — L89.896: Status: ACTIVE | Noted: 2025-01-01

## 2025-06-04 PROBLEM — N17.9 AKI (ACUTE KIDNEY INJURY) (HCC): Status: ACTIVE | Noted: 2025-01-01

## 2025-06-04 PROBLEM — E46 PROTEIN CALORIE MALNUTRITION (HCC): Status: ACTIVE | Noted: 2023-01-08

## 2025-06-04 PROBLEM — G93.40 ACUTE ENCEPHALOPATHY: Status: ACTIVE | Noted: 2025-01-01

## 2025-06-04 PROBLEM — L02.91 ABSCESS OF MULTIPLE SITES: Status: ACTIVE | Noted: 2025-01-01

## 2025-06-04 NOTE — H&P
Attending Staff Note    I have seen and examined the patient.  I have reviewed the medical record, laboratory data, imaging and all relevant studies.  I have discussed the plan of care with the Internal Medicine Resident and multidisciplinary team, and agree with the note and plan as documented.     76yo female with hx of transverse myelitis, paraplegic, bedbound, hx of right femur fracture in 2023 s/p ORIF who was brought to outside ED at [facility] by  for AMS. Pt was intubated by EMS en route to [facility]. At ED, pt was hypotensive, received 2L fluid boluses and started on EPI and NE gtt.  Lactate 4.9. Blood cultures obtained. Vanc/piptazo stated. CT head non con negative for intracranial abnormalities. CT C/A/P with 3 rim-enhancing fluid collections in right hip/femur concerning of abscess. Also CT knee showed gas/fluid collection in distal femoral. Pt also has large sacral ulcer with open non-healing wound.  Open wound in lateral right knee was also noted and draining seropurulent fluid. Pt was admitted to ICU    On exam, pt completely obtunded. Not on sedation   Photos on media of sacrum and knee were reviewed but also examined personally.   Large area of sacral ulcer, open with some sloughing noted. No purulence. Multiple other ulcers around sacrum also noted, tunneling.   Open wound in lateral right knee was also noted and draining seropurulent fluid.  Deep swab from right knee was obtained as well as from sacrum .   Skin is dry   + anasarca  +2-3 pitting edema in lower ext up to upper thighs  Cap refill >3 secs in lower ext, 2-3 secs in upper ext.   Abdomen distended, no BS, but soft.   CTAB  RRR  Bedside POCUS with RV underfill. LVEF grossly normal. IVC collapsed.     Labs and imaging were reviewed  Repeat lactate 5.9    Assessment:   Septic shock with multiorgan failure  Multiple fluid collections/abscesses in right femur with possible extension to right knee.   Prosthetic joint infection of  right femur  Hx of right femur fracture in 2023 s/p ORIF   Large infected sacral ulcer.   Partial colonic obstruction with ?internal hernia vs volvulus  Severe metabolic acidosis  Lactic acidosis   Acute encephalopathy   Intubated for airway protection   Transverse myelitis, paraplegia and bedbound  Thrombus in infrarenal IVC and illiac veins    Overall very sick with poor prognosis. Her profound septic shock and MOF is likely due to multiple infectious sources: right femoral and knee abscesses, and sacral ulcers +/- ?partial colonic obstruction. Need source control if able. Surgeries were consulted.      Plan:   Gen surgery Dr. Cortez has been consulted and is following. D/w surgery No immediate need for intervention at this time  Ortho was consulted (Dr. Maier)  Need more fluid resuscitation. Pt is intravascularly depleted. Will give 2 more fluid boluses  Added vasopressin   On NE and EPI. Wean EPI to keep MAP >65  Hydrocortisone 100mg IV Q8H  Bicarb gtt  OG tube to suction   Linezolid/piptazo  Blood cultures obtained at ED  Serial lactates.   Correct metabolic acidosis, replete electrolytes  Monitor UOP, strict I/Os.   Start heparin gtt    Poor prognosis overall    Attempted to call  Kee but no answer.    The patient remains critically ill. Critical care time = 90 mins in directly providing and coordinating critical care and extensive data review. No time overlap and excludes procedures.       Braulio Kc D.O.  Critical Care

## 2025-06-04 NOTE — PROGRESS NOTES
Late entry. MD notified of vital signs and critical lab values. New orders received and implemented.

## 2025-06-04 NOTE — ACP (ADVANCE CARE PLANNING)
Met with Kee at bedside and discussed the gravity of the Dyana's septic shock.  They live in [place] and he has been caring for her for ~40 years since she had transverse myelitis.  He noticed these wounds have deteriorated over the last several weeks.    I described her overwhelming septic shock superimposed on her debility, and the further debility that surgery and prolonged critical illness would confer.    In this light we decided to transition to comfort focused care.

## 2025-06-04 NOTE — ASSESSMENT & PLAN NOTE
This is Septic shock Present on admission  SIRS criteria identified on my evaluation include: Hypothermia, with temperature less than 96.8 deg F, Tachycardia, with heart rate greater than 90 BPM, Tachypnea, with respirations greater than 20 per minute, and Leukocytosis, with WBC greater than 12,000  Clinical indicators of end organ dysfunction include Hypotension with systolic blood pressure less than 90 or MAP less than 65, Lactic Acid greater than 2, Toxic Metabolic Encephalopathy, Acute Respiratory Failure - (mechanical ventilation or BiPap or PaO2/FiO2 ratio < 300), and GCS < 15  Indicators of septic shock include: Sepsis present and initial lactate level result is greater than or equal to 4mmol/L   Sources is: Multiple abscesses, most likely right femur  Sepsis protocol initiated  Crystalloid Fluid Administration: Fluid resuscitation ordered per standard protocol - 30 mL/kg per current or ideal body weight  IV antibiotics as appropriate for source of sepsis  Linezolid started for penetration of abscesses, MRSA nares pending  Current pressor support with Levophed, vasopressin, and epinephrine GGT, titrate as necessary  Bicarb drig  Reassessment: I have reassessed the patient's hemodynamic status    Trend lactic acid  Blood and urine cultures pending

## 2025-06-04 NOTE — ED PROVIDER NOTES
ED Provider Note    CHIEF COMPLAINT  Chief Complaint   Patient presents with    Other     Transfer from [facility]. Pt is bedbound for x6 months,  as caregiver. Brought to ER for pressure wounds to sacrum, R knee, and bilateral heels.        EXTERNAL RECORDS REVIEWED  Inpatient Notes patient was discharged from the hospital 1/22/2023 after admission in the setting of a fall with femur fracture.  Notably with a history of paraplegia secondary to known transverse myelitis.  She was noted to have multiple soft tissue wounds that appeared infected for which she required antibiotic therapy, had septic shock at that time.    HPI/ROS  LIMITATION TO HISTORY   Select: Altered mental status / Confusion  OUTSIDE HISTORIAN(S):  EMS report that the patient was brought into the outside facility by  for altered mentation.  No significant appreciable history was otherwise provided.  The patient was intubated upon their arrival for obtundation for airway protection.  She received ceftriaxone at that facility and 1 L of fluid total.  She was profoundly hypotensive and on dual pressor therapy with norepinephrine and epinephrine    Dyana Conway is a 75 y.o. female who presents to the ER for evaluation of abnormal mentation and concern for septic shock with multiple wounds.  Patient is currently intubated, sedated with severely limited history.  As above patient was previously admitted with septic shock secondary to multiple wounds and has a known history of paraplegia and transverse myelitis.    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    SURGICAL HISTORY   has a past surgical history that includes abdominal  "exploration; wound irrigation & debridement (Right, 1/9/2023); and orif, fracture, femur (Right, 1/9/2023).    FAMILY HISTORY  Family History   Family history unknown: Yes       SOCIAL HISTORY  Social History     Tobacco Use    Smoking status: Heavy Smoker     Current packs/day: 0.50     Average packs/day: 0.5 packs/day for 15.0 years (7.5 ttl pk-yrs)     Types: Cigarettes    Smokeless tobacco: Not on file   Vaping Use    Vaping status: Never Used   Substance and Sexual Activity    Alcohol use: No    Drug use: Never    Sexual activity: Not on file       CURRENT MEDICATIONS  Home Medications       Reviewed by Farideh Campbell (Pharmacy Tech) on 06/04/25 at 0248  Med List Status: Unable to Obtain     Medication Last Dose Status   amoxicillin-clavulanate (AUGMENTIN) 500-125 MG Tab  Active   ascorbic acid (VITAMIN C) 500 MG tablet  Active   enoxaparin (LOVENOX) 40 MG/0.4ML Solution Prefilled Syringe inj  Active   ferrous gluconate (FERGON) 324 (38 Fe) MG Tab  Active   levothyroxine (SYNTHROID) 50 MCG Tab  Active   multivitamin Tab  Active   triamcinolone acetonide (KENALOG) 0.1 % Cream  Active                  Audit from Redirected Encounters    **Home medications have not yet been reviewed for this encounter**         ALLERGIES  Allergies[1]    PHYSICAL EXAM  VITAL SIGNS: /66   Pulse (!) 102   Resp (!) 26   Ht 1.676 m (5' 5.98\")   Wt 63.7 kg (140 lb 6.9 oz)   SpO2 100%   BMI 22.68 kg/m²    Constitutional: Intubated, sedated, unresponsive  HEENT: Atraumatic, dry mucous membranes.  Pupils 3 mm and equal  Neck: Supple, trachea midline  Cardiovascular: Tachycardic, regular.  Delayed capillary refill with mottling of skin distally.  1+ radial pulses bilaterally  Thorax & Lungs: Intubated and mechanically ventilated with symmetric breath sounds  GI: Soft, non-distended  Skin: Cool.  Multiple chronic appearing grossly infected wounds to the sacrum, low back, gluteal area bilaterally, right knee with a large open " wound draining pus.  All of these are surrounded by erythema, very foul-smelling.          Musculoskeletal: Deformity to the right knee associated with an open wound.  Neurologic: A&Ox0, intubated, sedated      EKG/LABS  Labs Reviewed   LACTIC ACID - Abnormal; Notable for the following components:       Result Value    Lactic Acid 4.9 (*)     All other components within normal limits   CBC WITH DIFFERENTIAL - Abnormal; Notable for the following components:    WBC 22.9 (*)     RBC 3.04 (*)     Hemoglobin 7.6 (*)     Hematocrit 27.6 (*)     MCH 25.0 (*)     MCHC 27.5 (*)     RDW 68.4 (*)     Neutrophils-Polys 90.70 (*)     Lymphocytes 0.00 (*)     Nucleated RBC 0.50 (*)     Neutrophils (Absolute) 21.73 (*)     Lymphs (Absolute) 0.00 (*)     Anisocytosis 2+ (*)     Microcytosis 2+ (*)     All other components within normal limits   COMP METABOLIC PANEL - Abnormal; Notable for the following components:    Sodium 127 (*)     Potassium 3.3 (*)     Co2 12 (*)     Glucose 212 (*)     Calcium 8.0 (*)     Alkaline Phosphatase 139 (*)     Albumin 2.1 (*)     Total Protein 4.8 (*)     All other components within normal limits   URINALYSIS - Abnormal; Notable for the following components:    Protein 100 (*)     Leukocyte Esterase Moderate (*)     Occult Blood Small (*)     All other components within normal limits   PROBRAIN NATRIURETIC PEPTIDE, NT - Abnormal; Notable for the following components:    NT-proBNP 1560 (*)     All other components within normal limits   TROPONIN - Abnormal; Notable for the following components:    Troponin T 36 (*)     All other components within normal limits   ESTIMATED GFR - Abnormal; Notable for the following components:    GFR (CKD-EPI) 55 (*)     All other components within normal limits   URINE MICROSCOPIC (W/UA) - Abnormal; Notable for the following components:    WBC >100 (*)     RBC 11-20 (*)     Bacteria Few (*)     Urine Casts 3-5 (*)     All other components within normal limits    LACTIC ACID - Abnormal; Notable for the following components:    Lactic Acid 5.9 (*)     All other components within normal limits   POCT GLUCOSE DEVICE RESULTS - Abnormal; Notable for the following components:    POC Glucose, Blood 235 (*)     All other components within normal limits   POCT ARTERIAL BLOOD GAS DEVICE RESULTS - Abnormal; Notable for the following components:    Ph 7.248 (*)     Pco2 30.2 (*)     Po2 254 (*)     Tco2 14 (*)     S02 100 (*)     Hco3 13.2 (*)     BE -13 (*)     Ph Temp Rosalinda 7.264 (*)     Pco2 Temp Co 28.7 (*)     Po2 Temp Cor 249 (*)     All other components within normal limits   POCT LACTATE DEVICE RESULTS - Abnormal; Notable for the following components:    iStat Lactate 4.0 (*)     All other components within normal limits   POCT GLUCOSE DEVICE RESULTS - Abnormal; Notable for the following components:    POC Glucose, Blood 234 (*)     All other components within normal limits   BLOOD CULTURE   BLOOD CULTURE   MRSA BY PCR (AMP)   MAGNESIUM   PHOSPHORUS   DIFFERENTIAL MANUAL   PERIPHERAL SMEAR REVIEW   PLATELET ESTIMATE   MORPHOLOGY   PROCALCITONIN   GRAM STAIN   GRAM STAIN   LACTIC ACID   URINE CULTURE(NEW)   CULTURE WOUND W/ GRAM STAIN   CULTURE WOUND W/ GRAM STAIN   POCT COV-2, FLU A/B, RSV BY PCR   POC COV-2, FLU A/B, RSV BY PCR     Results for orders placed or performed during the hospital encounter of 25   EKG   Result Value Ref Range    Report       [facility]    Test Date:  2025  Pt Name:    HARISH OCLE           Department: ER  MRN:        [id number]                      Room:       [facility]  Gender:     Female                       Technician: JACK  :        1950                   Requested By:LISA DURBIN  Order #:    [id number]                    Reading MD: Ava Mclain MD    Measurements  Intervals                                Axis  Rate:       81                           P:          0  ND:         0                             QRS:        82  QRSD:       88                           T:          0  QT:         402  QTc:        467    Interpretive Statements  Atrial fibrillation  Paired ventricular premature complexes  Anterior infarct, old  Lead(s) I were not used for morphology analysis  No previous ECG available for comparison  Electronically Signed On 06- 05:54:04 PDT by Ava Mclain MD         I have independently interpreted this EKG    RADIOLOGY/PROCEDURES   I have independently interpreted the diagnostic imaging associated with this visit and am waiting the final reading from the radiologist.   My preliminary interpretation is as follows: CT head with no intracranial hemorrhage.  CT chest abdomen pelvis by my review demonstrating 3 large fluid collection surrounding the right hip, right proximal femur    Radiologist interpretation:  CT-KNEE WITH RIGHT   Final Result      1.  Interval fixation of the pathologic distal femoral fracture noted previously.   2.  There is a soft tissue wound laterally with exposed bone.   3.  There is a peripherally enhancing gas and fluid collection posterior to the distal femoral diaphysis. This may communicate with the skin defect although a separate abscess is possible   4.  Subcutaneous edema. This may be seen with cellulitis..      CT-CHEST,ABDOMEN,PELVIS WITH   Final Result      1.  There are 3 rim-enhancing fluid collections surrounding the right hip and proximal right femur, suspicious for abscesses.   2.  There is partial colonic dilation. There is some twisting of mesentery in the left lower quadrant. Therefore, partial colonic obstruction secondary to internal hernia or volvulus is possible.   3.  There is thrombus within the infrarenal IVC and right common iliac vein. The left common, internal, and external iliac veins appear occluded.   4.  Trace left pleural effusion.   5.  Emphysema.   6.  Generalized body wall anasarca.   Ordering physician notified of the venous thrombus and  bowel abnormalities 0345 hours.      CT-HEAD W/O   Final Result      No acute process.               DX-ABDOMEN FOR TUBE PLACEMENT   Final Result      Gastric tube terminates in the stomach.      DX-KNEE 2- RIGHT   Final Result      Postoperative changes as detailed above.      DX-CHEST-PORTABLE (1 VIEW)   Final Result      No acute process.        Central Line Placement Procedure Note  Indication: vascular access, centrally administered medications, and need for frequent blood draws    Consent: Unable to be obtained due to the emergent nature of this procedure.    Procedure: The patient was positioned appropriately and the skin over the right internal jugular vein was prepped with betadine and draped in a sterile fashion. Local anesthesia was not performed due to the emergent nature of this procedure.  A large bore needle was used to identify the vein.  A guide wire was then inserted into the vein through the needle. A triple lumen catheter was then inserted into the vessel over the guide wire using the Seldinger technique.  All ports showed good, free flowing blood return and were flushed with saline solution.  The catheter was then securely fastened to the skin with sutures and covered with a sterile dressing.  A post procedure X-ray was ordered and showed good line position.    The patient tolerated the procedure well.    Complications: None      COURSE & MEDICAL DECISION MAKING    ASSESSMENT, COURSE AND PLAN  Care Narrative:     Patient presents to the emergency department as a transfer from [facility] where allegedly the patient was brought by her  for evaluation of abnormal mentation.  On arrival to that facility patient was found to have multiple infected wounds from decubitus ulcers to her back and legs.  She was started on antibiotic therapy, given fluids and transfer was arranged to this facility via care flight.  Critical care transport team intubated the patient and started vasopressors which were  maxed out by the time of arrival.  On arrival patient profoundly hypotensive, intubated and unresponsive.  Push dose phenylephrine was immediately provided with some transient improvement in blood pressure to allow time to arrange set up of norepinephrine and epinephrine infusions which were continued and rapidly uptitrated.  Patient was also medicated with 100 mg of IV hydrocortisone given concern for refractory septic shock.  Antibiotic therapy was broadened given concern for necrotizing soft tissue infection with Zosyn and vancomycin and completion of 30 cc/kg IV fluids provided as well.  Given limitation of IV access a central line was placed as above.  Patient was ordered for an extensive laboratory and imaging workup to further characterize the degree of her infections and assess for any intracranial abnormality to explain her obtundation.  CT head was largely unremarkable.  CT scans of her abdomen, chest, pelvis pending at the time of admission to the ICU and results to be followed up by intensivist.  Labs remarkable for a significant leukocytosis, anemia of unclear etiology, possibly myelosuppression or related to acute illness.  No evidence of acute infection.  Lactic acidosis, hyponatremia, hypokalemia.  Case was discussed with Dr. Cortez on-call general surgeon given concern for necrotizing infections and potential need for source control.  Recommends medical management at this point given patient's tenuous status making her not an operative candidate.  Case discussed with Dr. Kc, intensivist who will accept the patient to the ICU for ongoing care.  Remainder of care per him and the ICU team.    After the patient left the emergency department for the ICU radiologist reached out to me with CT findings concerning potentially concerning for partial colonic obstruction versus volvulus.  This was communicated to the ICU team who will reach out to surgery.    Hydration: Based on the patient's presentation of  Sepsis the patient was given IV fluids. IV Hydration was used because oral hydration was not adequate alone. Upon recheck following hydration, the patient was improved but necessitating ongoing resuscitation.      CRITICAL CARE  The very real possibilty of a deterioration of this patient's condition required the highest level of my preparedness for sudden, emergent intervention.  I provided critical care services, which included medication orders, frequent reevaluations of the patient's condition and response to treatment, ordering and reviewing test results, and discussing the case with various consultants.  The critical care time associated with the care of the patient was 60 minutes. Review chart for interventions. This time is exclusive of any other billable procedures.       ADDITIONAL PROBLEMS MANAGED  Septic shock for which aggressive fluid resuscitation, vasopressors initiated, antibiotics started  Acute hypoxic respiratory failure for which the ventilator was managed based on blood gases      DISPOSITION AND DISCUSSIONS  I have discussed management of the patient with the following physicians and ABBEY's: As above intensivist and general surgical teams    Decision tools and prescription drugs considered including, but not limited to: Antibiotics Zosyn, vancomycin.    FINAL DIAGNOSIS  1. Septic shock (HCC)    2. Infected wound    3. Altered mental status, unspecified altered mental status type    4. Acute hypoxic respiratory failure (HCC)    5. Acute encephalopathy         Electronically signed by: Juan Bueno M.D., 6/4/2025 1:40 AM           [1]   Allergies  Allergen Reactions    Doxycycline Rash     median    Macrodantin [Nitrofurantoin] Unspecified     Unknown    Sulfa Drugs Hives     1999-[birth year]-04

## 2025-06-04 NOTE — PROGRESS NOTES
DATE: 6/4/2025    INTERVAL EVENTS:  Elderly woman with ventilator dependent respiratory failure and septic shock associated with multiple complex lower extremity wounds.  Will require operative debridement if she survives initial resucitative measures.  Poor prognosis.    ASSESSMENT AND PLAN:  [facility] will follow.       ____________________________________     Flaco Cortez M.D.    DD: 6/4/2025  2:18 AM

## 2025-06-04 NOTE — CARE PLAN
The patient is Unstable - High likelihood or risk of patient condition declining or worsening         Progress made toward(s) clinical / shift goals:    Problem: Safety - Medical Restraint  Goal: Remains free of injury from restraints (Restraint for Interference with Medical Device)  Outcome: Progressing     Problem: Pain - Standard  Goal: Alleviation of pain or a reduction in pain to the patient’s comfort goal  Outcome: Progressing       Patient is not progressing towards the following goals:      Problem: Knowledge Deficit - Standard  Goal: Patient and family/care givers will demonstrate understanding of plan of care, disease process/condition, diagnostic tests and medications  Outcome: Not Progressing     Problem: Safety - Medical Restraint  Goal: Free from restraint(s) (Restraint for Interference with Medical Device)  Outcome: Not Progressing

## 2025-06-04 NOTE — DISCHARGE PLANNING
Pt was taken to [facility] on 6/3 at 1940 after [facility] responded to 911 call-unsure who initiated the call. [facility] Office are the same office for that area.     TC to [facility]'s Office  badge 126 Dispatcher  Efrem, who is coincidentally Pt's neighbor and has had to go to their home to 'help get Dyana back in to her chair after a fall' several times)Pt is WC bound at [facility]. Officer Efrem reports the last time he knew Pt to be outside was during the holidays 2024. He feels both Pt and her  are in failing health and not safe to live alone. They have no children.     Officer Efrem reports that Pt told him 4 years ago that she 'only has 6 months to live'      APS report made to [facility] Case #[id number]    1230 Kee not answering calls, TC placed to [facility] SO Dispatch Efrem who reports that his wife offered to drive Kee here but Kee was insistent he drive himself. As far as they know, Kee is en route to [facility].

## 2025-06-04 NOTE — DISCHARGE PLANNING
"Met with Pt's  riddhi at bedside. Riddhi says he 'knew she was dying for a long time\". He defers any therapy or grief support resources, given Difficult Times resource packet and [facility] contact information.       "

## 2025-06-04 NOTE — PROGRESS NOTES
Critical Care Progress Note    Date of admission  6/4/2025    Summary  75 y.o. female with history of transverse myelitis, paraplegia, bedbound, right femur fracture s/p ORIF in 2023, who presented to an outside hospital on 6/4 with encephalopathy.  She was intubated by EMS.  She was found to be in septic shock.  CT demonstrated abscesses in the right lower extremity.  Additionally she had a sacral wound in the right lateral knee draining purulent fluid.    Hospital Course  6/4: Admitted to the ICU with septic shock, respiratory failure, with skin soft tissue source    Interval Problem Update  N: RASS neg 4 off sedation.  CT head neg.  CV: Epinephrine 0.14, norepinephrine 0.6, vaso 0.03, lactate 4 to 8.  ScvO2 32%.   R: 26x360, 40%/8 PEEP  FEN/GI: NPO  /Renal: Cr 1  I/O net +3.4 over last 24 hours  Heme: hemoglobin 7.6, Platelets 170k  ID: WBC 23K  Skin/MSK: RLE wounds, sacral wound  Endo: hyperglycemia, SSI, hydrocortisone    Family:  updated 6/4  Home medications reconciled       Review of Systems  ROS     Vital Signs for last 24 hours   Pulse:  [] 102  Resp:  [9-114] 26  BP: ()/(42-76) 100/66  SpO2:  [97 %-100 %] 100 %    Hemodynamic parameters for last 24 hours       Respiratory Information for the last 24 hours  Vent Mode: APVCMV  Rate (breaths/min): 26  Vt Target (mL): 360  PEEP/CPAP: 8  MAP: 12  Control VTE (exp VT): 359    Physical Exam   Physical Exam  Vitals reviewed.   Constitutional:       General: She is in acute distress.      Appearance: She is ill-appearing.   HENT:      Head:      Comments: ETT in situ  Eyes:      General: No scleral icterus.  Cardiovascular:      Rate and Rhythm: Normal rate. Rhythm irregular.      Comments: Frequent ectopy on monitor  Pulmonary:      Effort: No respiratory distress.   Abdominal:      General: There is no distension.      Palpations: Abdomen is soft.   Musculoskeletal:         General: No swelling.      Comments: R thigh and ankle open wounds  with purulent drainage   Skin:     General: Skin is warm.         Medications  Current Medications[1]    Fluids    Intake/Output Summary (Last 24 hours) at 6/4/2025 0955  Last data filed at 6/4/2025 0634  Gross per 24 hour   Intake 3386.06 ml   Output 310 ml   Net 3076.06 ml       Laboratory  Recent Labs     06/04/25  0111 06/04/25  0645   ISTATAPH 7.248* 7.308*   ISTATAPCO2 30.2* 16.5*   ISTATAPO2 254* 293*   ISTATATCO2 14* 9*   WONBQWI1ZYJ 100* 100*   ISTATARTHCO3 13.2* 8.3*   ISTATARTBE -13* -17*   ISTATTEMP 96.5 F 32.3 C   ISTATFIO2 50 40   ISTATSPEC Arterial Arterial   ISTATAPHTC 7.264* 7.374   SMINWRYK4JO 249* 272*         Recent Labs     06/04/25  0106   SODIUM 127*   POTASSIUM 3.3*   CHLORIDE 100   CO2 12*   BUN 18   CREATININE 1.07   MAGNESIUM 2.1   PHOSPHORUS 3.1   CALCIUM 8.0*     Recent Labs     06/04/25  0106   ALTSGPT <5   ASTSGOT 12   ALKPHOSPHAT 139*   TBILIRUBIN <0.2   GLUCOSE 212*     Recent Labs     06/04/25  0106   WBC 22.9*   NEUTSPOLYS 90.70*   LYMPHOCYTES 0.00*   MONOCYTES 1.70   EOSINOPHILS 0.00   BASOPHILS 0.00   ASTSGOT 12   ALTSGPT <5   ALKPHOSPHAT 139*   TBILIRUBIN <0.2     Recent Labs     06/04/25  0106 06/04/25  0815   RBC 3.04*  --    HEMOGLOBIN 7.6*  --    HEMATOCRIT 27.6*  --    PLATELETCT 172  --    PROTHROMBTM  --  23.2*   APTT  --  47.5*   INR  --  2.05*       Assessment/Plan  * Septic shock (HCC)- (present on admission)  Assessment & Plan  Source likely SSTI due to decubitus wounds.  CT with multifocal abscesses in close proximity to femur prosthesis.  Orthopedic and general surgery consultation for source control.  Bedside echo with severe LV dysfunction with Takotsubo pattern.  No apparent LVOT gradient.  ScvO2 32%.    Norepinephrine, vaso, epi for MAP>65mmHg  Empiric linezolid+pip/tazo  Blood cultures  Trend lactate  Stress dose steroids       Advanced care planning/counseling discussion  Assessment & Plan  Updated , Kee by phone.  They live in [place].  Dyana has had  decline over months, and was in fact arranging hospice care.  They had discussed a DNR order.  I explained her dire condition with septic shock, multiorgan failure, stress cardiomyopathy, severe skin and soft tissue infection with multifocal abscesses which would require extensive surgery.  We agreed that extensive surgery with potential amputation would compromise her quality of life to a degree that she would not find satisfactory.  In this light, he is on his way from [facility] to transition to comfort focused care this afternoon.  The remainder of her family lives in [place] and he will notify them.  We will continue supportive care in the interim.    I discussed this with Dr. Epperson and Dr. Maier who are in agreement.    Acute deep vein thrombosis (DVT) of non-extremity vein  Assessment & Plan  IVC thrombus and bilateral iliac vein thrombi identified on CT  Heparin infusion    CHULA (acute kidney injury) (HCC)  Assessment & Plan  Cr 1 from 0.5 baseline. No indications for renal replacement therapy at present.  monitor UOP and electrolytes  Isotonic bicarb per BICAR-ICU      Acute encephalopathy  Assessment & Plan  Likely related to septic shock, hypoNa, CHULA.  CT head unrevealing.   Monitor      Paraplegia (HCC)- (present on admission)  Assessment & Plan  Bedbound/wheelchair bound at baseline on account of transverse myelitis    Acute hypoxic respiratory failure (HCC)- (present on admission)  Assessment & Plan  Intubated on 6/4 for airway protection.  titrate FiO2 to saturation 92-96%  Vt 6-8mL/kg IBW   GI prophlyaxis  Oral hygeine  daily SAT/SBT  HOB elevation      Protein calorie malnutrition (HCC)- (present on admission)  Assessment & Plan  Dietary consultation  Thiamine  multivitamin         VTE:  Lovenox  Ulcer: H2 Antagonist  Lines: Central Line  Ongoing indication addressed and Arterial Line  Ongoing indication addressed    Critical care time: 105 minutes in addition, excluding procedure         [1]    Current Facility-Administered Medications   Medication Dose Route Frequency Provider Last Rate Last Admin    norepinephrine (Levophed) 8 mg in 250 mL NS infusion (premix)  0-1 mcg/kg/min (Ideal) Intravenous Continuous Juan Bueno M.D. 111.2 mL/hr at 06/04/25 0839 1 mcg/kg/min at 06/04/25 0839    NS infusion   Intravenous Continuous PHILL SernaOTy   Stopped at 06/04/25 0400    insulin lispro (HumaLOG,AdmeLOG) subcutaneous injection  1-6 Units Subcutaneous Q6HRS Jose Manuel Tiwari M.D.   2 Units at 06/04/25 0556    And    dextrose 50 % (D50W) injection 25 g  25 g Intravenous Q15 MIN PRN Jose Manuel Tiwari M.D.        potassium chloride in water (Kcl) ivpb (ICU ONLY) 40 mEq  40 mEq Intravenous Once Jose Manuel Tiwari M.D. 50 mL/hr at 06/04/25 0814 40 mEq at 06/04/25 0814    vasopressin (Vasostrict) 20 Units in  mL Infusion  0.06 Units/min Intravenous Continuous Piotr Morris M.D. 18 mL/hr at 06/04/25 0840 0.06 Units/min at 06/04/25 0840    hydrocortisone sodium succinate PF (Solu-CORTEF) injection 100 mg  100 mg Intravenous Q8HRS LEVAR Serna.O.   100 mg at 06/04/25 0837    piperacillin-tazobactam (Zosyn) 4.5 g in  mL IVPB  4.5 g Intravenous Q8HRS LEVAR Serna.O. 25 mL/hr at 06/04/25 0600 Rate Verify at 06/04/25 0600    sodium bicarbonate 150 mEq in D5W infusion (premix)   Intravenous Continuous Piotr Morris M.D. 125 mL/hr at 06/04/25 0605 New Bag at 06/04/25 0605    NS infusion   Intravenous Continuous LEVAR Serna.OTy   Stopped at 06/04/25 0531    Linezolid (Zyvox) premix 600 mg  600 mg Intravenous Q12HRS LEVAR Serna.OTy   Stopped at 06/04/25 0727    Respiratory Therapy Consult   Nebulization Continuous RT Braulio Kc D.O.        famotidine (Pepcid) tablet 20 mg  20 mg Enteral Tube Q12HRS Braulio Kc D.O.   20 mg at 06/04/25 0836    Or    famotidine (Pepcid) injection 20 mg  20 mg Intravenous Q12HRS Braulio Kc D.O.        senna-docusate (Pericolace Or Senokot S) 8.6-50 MG per tablet 2 Tablet  2  Tablet Enteral Tube BID PHILL SernaOTy   2 Tablet at 06/04/25 0837    And    polyethylene glycol/lytes (Miralax) Packet 1 Packet  1 Packet Enteral Tube QDAY PRN Braulio Kc D.O.        And    magnesium hydroxide (Milk Of Magnesia) suspension 30 mL  30 mL Enteral Tube QDAY PRN Braulio Kc D.O.        And    bisacodyl (Dulcolax) suppository 10 mg  10 mg Rectal QDAY PRN Braulio Kc D.O.        MD Alert...ICU Electrolyte Replacement per Pharmacy   Other PHARMACY TO DOSE Braulio Kc D.O.        lidocaine (Xylocaine) 1 % injection 2 mL  2 mL Tracheal Tube Q30 MIN PRN Braulio Kc D.O.        fentaNYL (Sublimaze) injection 100 mcg  100 mcg Intravenous Q15 MIN PRN Braulio Kc D.O.        And    fentaNYL (Sublimaze) injection 200 mcg  200 mcg Intravenous Q15 MIN PRN Braulio Kc D.O.        And    fentaNYL (SUBLIMAZE) 50 mcg/mL in 50mL (Continuous Infusion)   Intravenous Continuous Braulio Kc D.O.   Held at 06/04/25 0645    And    dexmedetomidine (Precedex) 400 MCG/100ML infusion  0-1.5 mcg/kg/hr (Ideal) Intravenous Continuous Braulio Kc D.O.   Held at 06/04/25 0645    thiamine (B-1) injection 100 mg  100 mg Intravenous DAILY Piotr Morris M.D.   100 mg at 06/04/25 0838    multivitamin tablet 1 Tablet  1 Tablet Enteral Tube DAILY Piotr Morris M.D.   1 Tablet at 06/04/25 0837    levothyroxine (Synthroid) tablet 50 mcg  50 mcg Enteral Tube AM ES Piotr Morris M.D.   50 mcg at 06/04/25 0837    heparin infusion 25,000 units in 500 mL 0.45% NACL  0-30 Units/kg/hr Intravenous Continuous Braulio Kc D.O.        heparin injection 5,100 Units  80 Units/kg Intravenous Once Braulio Kc D.O.        heparin injection 2,500 Units  40 Units/kg Intravenous PRN Braulio Kc D.O.        sodium bicarbonate 8.4 % 150 mEq in sterile water 1,000 mL infusion  150 mEq Intravenous Continuous Piotr Morris M.D. 125 mL/hr at 06/04/25 0906 150 mEq at 06/04/25 0906    dakins 0.125% (1/4 strength) topical soln   Topical BID Braulio Kc D.O.         norepinephrine (Levophed) infusion 32 mg/500 mL (premix)  0-1 mcg/kg/min (Ideal) Intravenous Continuous Piotr Morris M.D.        EPINEPHrine (Adrenalin) infusion 4 mg/250 mL (premix)  0-0.5 mcg/kg/min (Ideal) Intravenous Continuous Piotr Morris M.D.

## 2025-06-04 NOTE — ED NOTES
Unable to obtain. Called Kee  phone # in contacts and number is no longer in service, Pharmacy not open at this time

## 2025-06-04 NOTE — ASSESSMENT & PLAN NOTE
Intubated and sedated for altered mentation and increased work of breathing  Likely secondary to septic shock  Chest x-ray without acute abnormality, ABG with acidosis  Continue lung protective and ventilation  ABCDEF bundle  RT consulted

## 2025-06-04 NOTE — PROGRESS NOTES
Pharmacy Medication Reconciliation    ~Medication Reconciliation updated and complete per patient spouse over the phone   ~Is dispense history available in EPIC: no           ~Patient reports NO Prescription or OTC medications

## 2025-06-04 NOTE — DISCHARGE SUMMARY
Death Summary    Cause of Death  Septic shock due to soft tissue infection due to paraplegia due to transverse myelitis.    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 *      History of Presenting Illness and Hospital Course  75 y.o. female with history of transverse myelitis, paraplegia, bedbound, right femur fracture s/p ORIF in , who presented to an outside hospital on  with encephalopathy.  She was intubated by EMS.  She was found to be in septic shock.  CT demonstrated abscesses in the right lower extremity.  Additionally she had a sacral wound in the right lateral knee draining purulent fluid.      Her shock progressed to requiring four pressors.  I discussed this with her , Kee, who drove in from [place].   He has been caring for her for ~40 years since she had transverse myelitis.  He noticed these wounds have deteriorated over the last several weeks.     I described her overwhelming septic shock superimposed on her debility, and the further debility that surgery and prolonged critical illness would confer.    In this light we decided to transition to comfort focused care.  She  peacefully shortly thereafter.

## 2025-06-04 NOTE — ASSESSMENT & PLAN NOTE
Source likely SSTI due to decubitus wounds.  CT with multifocal abscesses in close proximity to femur prosthesis.  Orthopedic and general surgery consultation for source control.  Bedside echo with severe LV dysfunction with Takotsubo pattern.  No apparent LVOT gradient.  ScvO2 32%.    Norepinephrine, vaso, epi for MAP>65mmHg  Empiric linezolid+pip/tazo  Blood cultures  Trend lactate  Stress dose steroids

## 2025-06-04 NOTE — ASSESSMENT & PLAN NOTE
Reportedly found unresponsive by   CT head without acute abnormality  Intubated and sedated for altered mental status upon presentation  Workup for septic shock, see in problem list  ABGs, lites, trend CBCs

## 2025-06-04 NOTE — RESPIRATORY CARE
S.T.O.P for Intrahospital Transportation Safety for Ventilated Patients     S - Warren then maneuver.  Were airway secretions cleared? yes    T - Check your tube. Is your airway patent and in correct position? yes    O - Transition from oxygen cylinder to wall oxygen source on return.  Was this accomplished?  yes    P - Transition from ventilator battery power to wall power source on return.  Was this accomplished?  yes

## 2025-06-04 NOTE — ASSESSMENT & PLAN NOTE
Thin appearing especially in the distal extremities  Albumin low  Nutrition consulted  Goals of care discussion to be had

## 2025-06-04 NOTE — PROGRESS NOTES
Time of Arrival:0322  HR: 110  BP: 104/62  SpO2: 100% on 40% FiO2  RR: 26  Temp: 33.1C  Weight: 63.7 kg      Does patient consent to inventory of belongings:     Pt intubated & sedated --  unable to consent to search    Belongings at bedside:   Green shirt  Per purse      4 Eyes Skin Assessment Completed by CAREN Valladares and Cole RN.    Skin assessment is primarily focused on high risk bony prominences. Pay special attention to skin beneath and around medical devices, high risk bony prominences, skin to skin areas and areas where the patient lacks sensation to feel pain and areas where the patient previously had breakdown.     Head (Occipital):  WDL   Ears (Under Medical Devices): WDL   Nose (Under Medical Devices): WDL   Mouth:  missing teeth, poor dentition, ulcer to tongue   Neck: WDL   Breast/Chest:  WDL   Shoulder Blades:  WDL   Spine:   WDL   (R) Arm/Elbow/Hand: WDL   (L) Arm/Elbow/Hand: WDL   Abdomen: WDL   Pannus/Groin:  ulcer to groin, moisture fissure to L groin, discoloration to R labia   Sacrum/Coccyx:   Red, Purple/maroon, Non-Blanching, Boggy, and  open, tunneling wounds   (R) Ischial Tuberosity (Sit Bones):  Red, Purple/maroon, and Non-Blanchingopen, tunneling wound   (L) Ischial Tuberosity (Sit Bones):  Red, Purple/maroon, and Non-Blanchingopen, tunneling wound   (R) Leg:  ulcer to upper thigh, open wound to lateral knee   (L) Leg:  discoloration to medial upper shin   (R) Heel:  open wound   (R) Foot/Toe: dry, flaky skin   (L) Heel: open, bleeding wound   (L) Foot/Toe:  dry, flaky skin       DEVICES IN USE:   Respiratory Devices:  ET Tube  Feeding Devices:  OG tube   Lines & BP Monitoring Devices:  Peripheral IV, Central line, BP cuff, and Pulse ox    Orthopedic Devices:  N/A  Miscellaneous Devices:  Telemetry monitor, Parker, and Soft restraints    PROTOCOL INTERVENTIONS:   ICU Low Airloss Bed:  Applied this assessment and Ordered via RN Wound Protocol  Float Heels with Pillows:  Applied this  assessment and Ordered via RN Wound Protocol  Q2 Turns with Wedges:  Applied this assessment and Ordered via RN Wound Protocol  Glide Sheet:  Applied this assessment and Ordered via RN Wound Protocol  Dri-Jorge/Micro Climate Pads:  Applied this assessment and Ordered via RN Wound Protocol  Parker:  Already in place    WOUND PHOTOS:   Completed and in EPIC     WOUND CONSULT:   Consult ordered for the following areas Sacrum, knee, heels

## 2025-06-04 NOTE — PROCEDURES
"Arterial Line Insertion    Date/Time: 6/4/2025 5:34 AM    Performed by: Braulio Kc D.O.  Authorized by: Braulio Kc D.O.  Consent: The procedure was performed in an emergent situation  Required items: required blood products, implants, devices, and special equipment available  Patient identity confirmed: arm band  Time out: Immediately prior to procedure a \"time out\" was called to verify the correct patient, procedure, equipment, support staff and site/side marked as required.  Preparation: Patient was prepped and draped in the usual sterile fashion.  Indications: multiple ABGs and hemodynamic monitoring  Location: right radial  Anesthesia: see MAR for details    Sedation:  Patient sedated: no    Needle gauge: 20  Seldinger technique: Seldinger technique used  Number of attempts: 1  Post-procedure: line sutured and dressing applied  Post-procedure CMS: normal  Patient tolerance: patient tolerated the procedure well with no immediate complications              "

## 2025-06-04 NOTE — ASSESSMENT & PLAN NOTE
Updated , Kee by phone.  They live in [place].  Dyana has had decline over months, and was in fact arranging hospice care.  They had discussed a DNR order.  I explained her dire condition with septic shock, multiorgan failure, stress cardiomyopathy, severe skin and soft tissue infection with multifocal abscesses which would require extensive surgery.  We agreed that extensive surgery with potential amputation would compromise her quality of life to a degree that she would not find satisfactory.  In this light, he is on his way from [place] plan to transition to comfort focused care this afternoon.  The remainder of her family lives in [place] and he will notify them.  We will continue supportive care in the interim.    I discussed this with Dr. Epperson and Dr. Maier who are in agreement.

## 2025-06-04 NOTE — DISCHARGE PLANNING
Medical Social Work    Referral: Acute Medical Patient    Intervention: Pt is a 75 year old female brought in by [facility] from [facility].  Pt arrives intubated.  Pt is Dyana Conway (: 1950).  Per flight crew pt's spouse was at transferring facility.  Per chart review pt's spouse, Kee can be reached at: [phone] or [phone].  MSW attempted to reach pt's spouse with no answer and no ability to leave a message on either phone.    Plan: SW will follow.

## 2025-06-04 NOTE — ASSESSMENT & PLAN NOTE
Secondary to transverse myelitis  Complicated by multiple pressure wounds/ulcers with abscess formation and now sepsis  Once  becomes available, broach goals of care discussion

## 2025-06-04 NOTE — CARE PLAN
Problem: Ventilation  Goal: Ability to achieve and maintain unassisted ventilation or tolerate decreased levels of ventilator support  Description: Target End Date:  4 days Document on Vent flowsheet1.  Support and monitor invasive and noninvasive mechanical ventilation2.  Monitor ventilator weaning response3.  Perform ventilator associated pneumonia prevention interventions4.  Manage ventilation therapy by monitoring diagnostic test results  Outcome: Not Met         Ventilator Daily Summary    Vent Day # 1   Airway: 7.0 @ 22    Ventilator settings: apvcmv: 26/360/8/40   Weaning trials: none  Respiratory Procedures: none     Plan: Continue current ventilator settings and wean mechanical ventilation as tolerated per physician orders.

## 2025-06-04 NOTE — CONSULTS
Date of Service: 06/04/25    Dyana Conway was seen today in consultation for septic shock and hip and knee abscesses and infected hardware at the request of Dr. Kc    CC: Septic shock    HPI: Dyana Conway is a 75 y.o. female who presents with septic shock and altered mental status.  She has been bedbound for extended period of time.  She has a history of transverse myelitis and paraplegia.  She had a chronically open femur fracture that was treated in 2023 with intramedullary nail as well as antibiotic cement spacer.  The fracture has not healed.  On the CT scan yesterday there is an abscess overlying the anterior aspect of the hip as well as air surrounding the knee consistent with the chronic open wound over the knee.  There is a question of a separate abscess although this likely does communicate with the open exposed wound.  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    PMH: Past Medical History[1]    PSH: Past Surgical History[2]    FH:   Family History   Family history unknown: Yes       SH:   Social History     Socioeconomic History    Marital status:      Spouse name: Not on file    Number of children: Not on file    Years of education: Not on file    Highest education level: Not on file   Occupational History    Not on file   Tobacco Use    Smoking status: Heavy Smoker     Current packs/day: 0.50     Average packs/day: 0.5 packs/day for 15.0 years (7.5 ttl pk-yrs)     Types: Cigarettes    Smokeless tobacco: Not on file   Vaping Use    Vaping status: Never Used   Substance and Sexual Activity    Alcohol use: No    Drug use: Never    Sexual activity: Not on file   Other Topics Concern    Not on file   Social History Narrative    Not on file     Social Drivers of Health     Financial Resource Strain: Not on file   Food Insecurity: Not on file   Transportation Needs: Not on file  "  Physical Activity: Not on file   Stress: Not on file   Social Connections: Not on file   Intimate Partner Violence: Not on file   Housing Stability: Not on file       ROS: Unable to assess due to obtunded status    /66   Pulse (!) 102   Resp (!) 26   Ht 1.676 m (5' 5.98\")   Wt 63.7 kg (140 lb 6.9 oz)   SpO2 100%     Physical Exam:  General: Chronically ill-appearing.  Obtunded  HEENT: Normocephalic, atraumatic  Psych: Unable to assess  Neck: Unable to assess  Chest/Pulmonary: Intubated  Cardio: Regular heart rate and rhythm  Neuro: Unable to assess  Skin: Numerous large open wound including the sacrum the right knee as well as bilateral heels  MSK: Multiple open wounds are seen from the outside.  These wounds are currently dressed.  The clinical photos were reviewed.    Imaging and labs: CT scan of the pelvis as well as the right knee was independently reviewed and interpreted by myself.  This shows an abscess overlying the anterior aspect of the right hip as well as multiple areas of gas within the right knee and surrounding soft tissues consistent with the open wound seen laterally.  There is an unhealed fracture with a large cement spacer present as well as a intramedullary brian    Recent Labs     06/04/25  0106   WBC 22.9*   RBC 3.04*   HEMOGLOBIN 7.6*   HEMATOCRIT 27.6*   MCV 90.8   MCH 25.0*   RDW 68.4*   PLATELETCT 172   MPV 9.3   NEUTSPOLYS 90.70*   LYMPHOCYTES 0.00*   MONOCYTES 1.70   EOSINOPHILS 0.00   BASOPHILS 0.00   RBCMORPHOLO Present       Assessment:   1. Septic shock (HCC)  Arterial Line Insertion    Arterial Line Insertion      2. Infected wound  Arterial Line Insertion    Arterial Line Insertion      3. Altered mental status, unspecified altered mental status type        4. Acute hypoxic respiratory failure (HCC)        5. Acute encephalopathy  Arterial Line Insertion    Arterial Line Insertion          Patient has numerous issues that are currently active.  Unfortunate this probably " leads to a very poor prognosis in her current state.  Regarding the orthopedic issues, the right hip abscess likely could be a contributing factor to her sepsis.  The distal femur I would be less inclined to say is the source of sepsis due to the chronically open wound and path of egress.  The abscess seen at the knee area with small.  Unfortunate there is no salvage options available for the knee given the chronically ununited fracture and likely colonized/infected hardware.  The only option available for the knee infection would be above-knee amputation and hardware removal.  The hip abscess could likely be drained.  She would still have multiple open wounds to clean the sacrum and the heels.  These also have poor prognosis.  The only reasonable option for the heel ulcers would likely be amputation.  I would defer management of the sacral ulcer to plastic surgery as this would likely need flap reconstruction if even possible.  Family is not currently available or at bedside to discuss treatment options with.  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    Septic joint of left knee joint ([facility]) 1/8/2023    Urinary tract infection, site not specified    [2]   Past Surgical History:  Procedure Laterality Date    WOUND IRRIGATION & DEBRIDEMENT Right 1/9/2023    Procedure: IRRIGATION AND DEBRIDEMENT, WOUND;  Surgeon: Handy Chapa M.D.;  Location: [facility];  Service: Orthopedics    ORIF, FRACTURE, FEMUR Right 1/9/2023    Procedure: INTRAMEDULLARY NAIL FIXATION OF RIGHT FEMUR  WITH CEMENT SUPPLEMENTATION;  Surgeon: Handy Chapa M.D.;  Location: [facility];  Service: Orthopedics    ABDOMINAL EXPLORATION

## 2025-06-04 NOTE — ASSESSMENT & PLAN NOTE
Seen on CT abdomen pelvis and CT of right lower extremity  Dr. Cortez reviewing abdominal CT, current plan uncertain for source control  Dr. Maier of orthopedic surgery contacted for CT of right knee and hardware  Unable to reach  to address goals of care  Continue broad-spectrum antibiotics and pressor support

## 2025-06-04 NOTE — ASSESSMENT & PLAN NOTE
Cr 1 from 0.5 baseline. No indications for renal replacement therapy at present.  monitor UOP and electrolytes  Isotonic bicarb per [facility]-ICU

## 2025-06-04 NOTE — ASSESSMENT & PLAN NOTE
Intubated on 6/4 for airway protection.  titrate FiO2 to saturation 92-96%  Vt 6-8mL/kg IBW   GI prophlyaxis  Oral hygeine  daily SAT/SBT  HOB elevation

## 2025-06-04 NOTE — H&P
R ICU History & Physical Note    Date of Service  6/4/2025    [facility] Team: R ICU Gold Team   Attending: Braulio Kc D.o.  Senior Resident: Dr. Jose Manuel Tiwari  Contact Number: [phone]    Primary Care Physician  Reggie Spaulding P.A.-C.    Code Status  Full Code    Chief Complaint  Chief Complaint   Patient presents with    Other     Transfer from [facility]. Pt is bedbound for x6 months,  as caregiver. Brought to ER for pressure wounds to sacrum, R knee, and bilateral heels.        History of Presenting Illness (HPI):   Dyana Conway is a 75 y.o. female with a history of transverse myelitis with paraplegia and chronic decubitus wounds who presented 6/4/2025 with septic shock.  Per bedside nursing report, the patient was found by  to be unresponsive this morning.  She was rushed to [facility] by [facility] where she was intubated for altered mentation.  She was transferred to [facility] for higher level of care.  Attempts to reach her  have been futile.    Upon arrival, the patient was found to be hypotensive to 62/42 requiring pressor support, started on Levophed.  Her sodium was 127, potassium 3.3, glucose 212.  WBCs 22.9, hemoglobin 7.6 with an ANC of 21.7.  Troponin of 36, BNP of 1560.  Flu/COVID/RSV was negative.  Lactic acid was 4.9 and she was given 2 L LR boluses.  Central line was placed and ABG demonstrates a pH of 7.268, temperature corrected pCO2 of 28.7, and pO2 of 249.  Urinalysis demonstrates few bacteria with moderate leukocyte esterase, greater than 100 WBCs, 11-20 RBCs, 3-5 casts.  Chest x-ray and CT head are without acute abnormality.  CTAP demonstrates 3 rim-enhancing fluid collections of the right hip and proximal femur suspicious for abscesses, partial colonic dilation with some twisting of the mesentery in the left lower quadrant concerning for colonic obstruction secondary internal hernia or volvulus, thrombus within the infrarenal IVC  and right common iliac vein with occlusion of the left common, internal and external iliac veins.  Trace left pleural effusion emphysema are also present.  Blood and urine cultures as well as MRSA nares are pending.    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    Surgical History   has a past surgical history that includes abdominal exploration; wound irrigation & debridement (Right, 1/9/2023); and orif, fracture, femur (Right, 1/9/2023).     Family History  Family history is unknown by patient.   Family history reviewed with patient.     Social History  Unable to obtain    Allergies  Allergies[1]    Medications  Prior to Admission Medications   Prescriptions Last Dose Informant Patient Reported? Taking?   amoxicillin-clavulanate (AUGMENTIN) 500-125 MG Tab   No No   Sig: Take 1 Tablet by mouth 2 times a day.   ascorbic acid (VITAMIN C) 500 MG tablet   No No   Sig: Take 1 Tablet by mouth every day.   enoxaparin (LOVENOX) 40 MG/0.4ML Solution Prefilled Syringe inj   No No   Sig: Inject 40 mg under the skin every day at 6 PM.   ferrous gluconate (FERGON) 324 (38 Fe) MG Tab   No No   Sig: Take 1 Tablet by mouth 2 times a day with meals.   levothyroxine (SYNTHROID) 50 MCG Tab  Patient Yes No   Sig: Take 50 mcg by mouth every morning on an empty stomach.   multivitamin Tab   No No   Sig: Take 1 Tablet by mouth every day.   triamcinolone acetonide (KENALOG) 0.1 % Cream   Yes No   Sig: APPLY CREAM EXTERNALLY TO AFFECTED AREA TWICE DAILY FOR 7 DAYS FOR ECZEMA FLARES      Facility-Administered Medications: None       Physical Exam  Pulse:  []  105  Resp:  [9-114] 26  BP: ()/(42-76) 88/64  SpO2:  [97 %-100 %] 100 %  Blood Pressure : 115/76       Pulse: (!) 111   Respiration: (!) 26   Pulse Oximetry: 100 %       Physical Exam  Constitutional:       Appearance: She is toxic-appearing.   HENT:      Head: Normocephalic and atraumatic.      Nose: Nose normal.      Mouth/Throat:      Mouth: Mucous membranes are dry.      Pharynx: Oropharynx is clear.   Cardiovascular:      Rate and Rhythm: Regular rhythm. Tachycardia present.   Pulmonary:      Breath sounds: Normal breath sounds.   Abdominal:      General: Bowel sounds are normal.      Palpations: Abdomen is soft.   Musculoskeletal:      Right lower leg: No edema.      Left lower leg: No edema.   Skin:     Capillary Refill: Capillary refill takes 2 to 3 seconds.      Coloration: Skin is pale.      Findings: Bruising present.      Comments: Cold, poorly perfused   Neurological:      General: No focal deficit present.   Psychiatric:      Comments: Unable to assess           Laboratory:  Recent Labs     06/04/25  0106   WBC 22.9*   RBC 3.04*   HEMOGLOBIN 7.6*   HEMATOCRIT 27.6*   MCV 90.8   MCH 25.0*   MCHC 27.5*   RDW 68.4*   PLATELETCT 172   MPV 9.3     Recent Labs     06/04/25  0106   SODIUM 127*   POTASSIUM 3.3*   CHLORIDE 100   CO2 12*   GLUCOSE 212*   BUN 18   CREATININE 1.07   CALCIUM 8.0*     Recent Labs     06/04/25  0106   ALTSGPT <5   ASTSGOT 12   ALKPHOSPHAT 139*   TBILIRUBIN <0.2   GLUCOSE 212*         Recent Labs     06/04/25  0106   NTPROBNP 1560*         Recent Labs     06/04/25  0106   TROPONINT 36*       Imaging:  CT-KNEE WITH RIGHT   Final Result      1.  Interval fixation of the pathologic distal femoral fracture noted previously.   2.  There is a soft tissue wound laterally with exposed bone.   3.  There is a peripherally enhancing gas and fluid collection posterior to the distal femoral diaphysis. This may communicate with the skin defect although a separate abscess is possible   4.   Subcutaneous edema. This may be seen with cellulitis..      CT-CHEST,ABDOMEN,PELVIS WITH   Final Result      1.  There are 3 rim-enhancing fluid collections surrounding the right hip and proximal right femur, suspicious for abscesses.   2.  There is partial colonic dilation. There is some twisting of mesentery in the left lower quadrant. Therefore, partial colonic obstruction secondary to internal hernia or volvulus is possible.   3.  There is thrombus within the infrarenal IVC and right common iliac vein. The left common, internal, and external iliac veins appear occluded.   4.  Trace left pleural effusion.   5.  Emphysema.   6.  Generalized body wall anasarca.   Ordering physician notified of the venous thrombus and bowel abnormalities 0345 hours.      CT-HEAD W/O   Final Result      No acute process.               DX-ABDOMEN FOR TUBE PLACEMENT   Final Result      Gastric tube terminates in the stomach.      DX-KNEE 2- RIGHT   Final Result      Postoperative changes as detailed above.      DX-CHEST-PORTABLE (1 VIEW)   Final Result      No acute process.          X-Ray:  I have personally reviewed the images and compared with prior images.  EKG:  I have personally reviewed the images and compared with prior images.    Assessment/Plan:  Problem Representation:   * Septic shock (HCC)- (present on admission)  Assessment & Plan  This is Septic shock Present on admission  SIRS criteria identified on my evaluation include: Hypothermia, with temperature less than 96.8 deg F, Tachycardia, with heart rate greater than 90 BPM, Tachypnea, with respirations greater than 20 per minute, and Leukocytosis, with WBC greater than 12,000  Clinical indicators of end organ dysfunction include Hypotension with systolic blood pressure less than 90 or MAP less than 65, Lactic Acid greater than 2, Toxic Metabolic Encephalopathy, Acute Respiratory Failure - (mechanical ventilation or BiPap or PaO2/FiO2 ratio < 300), and GCS < 15  Indicators  of septic shock include: Sepsis present and initial lactate level result is greater than or equal to 4mmol/L   Sources is: Multiple abscesses, most likely right femur  Sepsis protocol initiated  Crystalloid Fluid Administration: Fluid resuscitation ordered per standard protocol - 30 mL/kg per current or ideal body weight  IV antibiotics as appropriate for source of sepsis  Linezolid started for penetration of abscesses, MRSA nares pending  Current pressor support with Levophed, vasopressin, and epinephrine GGT, titrate as necessary  Bicarb drig  Reassessment: I have reassessed the patient's hemodynamic status    Trend lactic acid  Blood and urine cultures pending    Abscess of multiple sites  Assessment & Plan  Seen on CT abdomen pelvis and CT of right lower extremity  Dr. Cortez reviewing abdominal CT, current plan uncertain for source control  Dr. Maier of orthopedic surgery contacted for CT of right knee and hardware  Unable to reach  to address goals of care  Continue broad-spectrum antibiotics and pressor support    Acute encephalopathy  Assessment & Plan  Reportedly found unresponsive by   CT head without acute abnormality  Intubated and sedated for altered mental status upon presentation  Workup for septic shock, see in problem list  ABGs, lites, trend CBCs    Pressure injury of deep tissue of right knee  Assessment & Plan  See paraplegia  Wound care consulted  Surgery on board for possible source control    Ulcer of both feet (HCC)  Assessment & Plan  See paraplegia  Wound care consulted  Surgery on board for possible source control    Hyponatremia  Assessment & Plan  Likely hypovolemic hyponatremia  Trend BMP after IV fluid boluses    Paraplegia (HCC)- (present on admission)  Assessment & Plan  Secondary to transverse myelitis  Complicated by multiple pressure wounds/ulcers with abscess formation and now sepsis  Once  becomes available, broach goals of care discussion    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, stage 4 (HCC)- (present on admission)  Assessment & Plan  See paraplegia  Wound care consulted  Surgery on board for possible source control    Hypokalemia- (present on admission)  Assessment & Plan  Unclear etiology, likely lower given current acidosis  Replete as needed  Trend BMP    Acute hypoxic respiratory failure (HCC)- (present on admission)  Assessment & Plan  Intubated and sedated for altered mentation and increased work of breathing  Likely secondary to septic shock  Chest x-ray without acute abnormality, ABG with acidosis  Continue lung protective and ventilation  ABCDEF bundle  RT consulted      Protein calorie malnutrition (HCC)- (present on admission)  Assessment & Plan  Thin appearing especially in the distal extremities  Albumin low  Nutrition consulted  Goals of care discussion to be had        VTE prophylaxis: SCDs/TEDs and enoxaparin ppx  I have discussed the details of this case including assessment and plan with my attending, Dr. Kc, who is in agreement.   Jose Manuel Tiwari M.D.         [1]   Allergies  Allergen Reactions    Doxycycline Rash     median    Macrodantin [Nitrofurantoin] Unspecified     Unknown    Sulfa Drugs Hives     1999-12-04

## 2025-06-06 LAB
BACTERIA UR CULT: NORMAL
BACTERIA WND AEROBE CULT: ABNORMAL
GRAM STN SPEC: ABNORMAL
GRAM STN SPEC: ABNORMAL
SIGNIFICANT IND 70042: ABNORMAL
SIGNIFICANT IND 70042: ABNORMAL
SIGNIFICANT IND 70042: NORMAL
SITE SITE: ABNORMAL
SITE SITE: ABNORMAL
SITE SITE: NORMAL
SOURCE SOURCE: ABNORMAL
SOURCE SOURCE: ABNORMAL
SOURCE SOURCE: NORMAL

## 2025-06-09 LAB
BACTERIA BLD CULT: NORMAL
BACTERIA BLD CULT: NORMAL
SIGNIFICANT IND 70042: NORMAL
SIGNIFICANT IND 70042: NORMAL
SITE SITE: NORMAL
SITE SITE: NORMAL
SOURCE SOURCE: NORMAL
SOURCE SOURCE: NORMAL

## (undated) DEVICE — DRAPE 36X28IN RAD CARM BND BG - (25/CA) O

## (undated) DEVICE — BANDAGE ROLL STERILE BULKEE 4.5 IN X 4 YD (100EA/CA)

## (undated) DEVICE — PACK UPPER EXTREMITY (2EA/CA)

## (undated) DEVICE — GUARD SPLASH FOR PULSEVAC (12EA/PK)

## (undated) DEVICE — ELECTRODE DUAL RETURN W/ CORD - (50/PK)

## (undated) DEVICE — SUTURE 0 VICRYL PLUS CT-1 - 8 X 18 INCH (12/BX)

## (undated) DEVICE — GUIDE WIRE BALL TIP 3X1000 STERILE

## (undated) DEVICE — SUCTION INSTRUMENT YANKAUER BULBOUS TIP W/O VENT (50EA/CA)

## (undated) DEVICE — DRAPE STRLE REG TOWEL 18X24 - (10/BX 4BX/CA)"

## (undated) DEVICE — GOWN SURGICAL XX-LARGE - (28EA/CA) SIRUS NON REINFORCED

## (undated) DEVICE — SUTURE 3-0 ETHILON FSLX 30 (36PK/BX)"

## (undated) DEVICE — WRAP COBAN SELF-ADHERENT 6 IN X  5YDS STERILE TAN (12/CA)

## (undated) DEVICE — SYS BN CMNT HI VAC KT MXR BWL - (MIX-E-VAC II)  (10EA/CA)

## (undated) DEVICE — TOWEL STOP TIMEOUT SAFETY FLAG (40EA/CA)

## (undated) DEVICE — HANDPIECE 10FT INTPLS SCT PLS IRRIGATION HAND CONTROL SET (6/PK)

## (undated) DEVICE — BANDAGE ELASTIC 4 HONEYCOMB - 4"X5YD LF (20/CA)"

## (undated) DEVICE — CUP DENTURE W/ LID - (200/CA)

## (undated) DEVICE — PAD LAP STERILE 18 X 18 - (5/PK 40PK/CA)

## (undated) DEVICE — GLOVE SZ 6.5 BIOGEL PI MICRO - PF LF (50PR/BX)

## (undated) DEVICE — SUTURE GENERAL

## (undated) DEVICE — LACTATED RINGERS INJ 1000 ML - (14EA/CA 60CA/PF)

## (undated) DEVICE — DRAPE C ARMOR (12EA/CA)

## (undated) DEVICE — STAPLER SKIN DISP - (6/BX 10BX/CA) VISISTAT

## (undated) DEVICE — SUTURE 2-0 VICRYL PLUS CT-1 - 8 X 18 INCH(12/BX)

## (undated) DEVICE — GLOVE BIOGEL PI INDICATOR SZ 7.0 SURGICAL PF LF - (50/BX 4BX/CA)

## (undated) DEVICE — SODIUM CHL. IRRIGATION 0.9% 3000ML (4EA/CA 65CA/PF)

## (undated) DEVICE — DRAPE LARGE 3 QUARTER - (20/CA)

## (undated) DEVICE — TUBING CLEARLINK DUO-VENT - C-FLO (48EA/CA)

## (undated) DEVICE — DRESSING PETROLEUM GAUZE 5 X 9" (50EA/BX 4BX/CA)"

## (undated) DEVICE — SET EXTENSION WITH 2 PORTS (48EA/CA) ***PART #2C8610 IS A SUBSTITUTE*****

## (undated) DEVICE — STOCKINET BIAS 4 IN STERILE - (20/CA)

## (undated) DEVICE — CONTAINER SPECIMEN BAG OR - STERILE 4 OZ W/LID (100EA/CA)

## (undated) DEVICE — PADDING CAST 4 IN STERILE - 4 X 4 YDS (24/CA)

## (undated) DEVICE — CANISTER SUCTION 3000ML MECHANICAL FILTER AUTO SHUTOFF MEDI-VAC NONSTERILE LF DISP  (40EA/CA)

## (undated) DEVICE — LOCKING DRILL 4.2X360MM

## (undated) DEVICE — GLOVE SZ 8 BIOGEL PI MICRO - PF LF (50PR/BX)

## (undated) DEVICE — SLEEVE, VASO, THIGH, MED

## (undated) DEVICE — FREEHAND DRILL 4.2X185MM

## (undated) DEVICE — PAD PREP 24 X 48 CUFFED - (100/CA)

## (undated) DEVICE — SPONGE GAUZESTER 4 X 4 4PLY - (128PK/CA)

## (undated) DEVICE — GOWN WARMING STANDARD FLEX - (30/CA)

## (undated) DEVICE — BAG SPONGE COUNT 10.25 X 32 - BLUE (250/CA)

## (undated) DEVICE — TOWELS CLOTH SURGICAL - (4/PK 20PK/CA)

## (undated) DEVICE — CHLORAPREP 26 ML APPLICATOR - ORANGE TINT(25/CA)

## (undated) DEVICE — GLOVESZ 8.5 BIOGEL PI MICRO - PF LF (50PR/BX)

## (undated) DEVICE — PADDING CAST 6 IN STERILE - 6 X 4 YDS (24/CA)

## (undated) DEVICE — GLOVE BIOGEL ECLIPSE PF LATEX SIZE 8.5 (50PR/BX)

## (undated) DEVICE — PACK LOWER EXTREMITY - (2/CA)

## (undated) DEVICE — BANDAGE ELASTIC 6 HONEYCOMB - 6X5YD LF (20/CA)"

## (undated) DEVICE — SUTURE ETHILON 2-0 FSLX 30 (36PK/BX)"

## (undated) DEVICE — REAMER SHAFT  MODIFIED TRINKLE  8X510MM

## (undated) DEVICE — BOVIE BLADE COATED - (50/PK)

## (undated) DEVICE — SET LEADWIRE 5 LEAD BEDSIDE DISPOSABLE ECG (1SET OF 5/EA)

## (undated) DEVICE — SENSOR OXIMETER ADULT SPO2 RD SET (20EA/BX)

## (undated) DEVICE — GLOVE BIOGEL PI INDICATOR SZ 6.5 SURGICAL PF LF - (50/BX 4BX/CA)

## (undated) DEVICE — DRAPE U ORTHOPEDIC - (10/BX)

## (undated) DEVICE — SWAB CULTURE AMIES ESWAB (50EA/PK)

## (undated) DEVICE — GLOVE BIOGEL PI ORTHO SZ 8.5 PF LF (40/BX)

## (undated) DEVICE — GLOVE BIOGEL PI INDICATOR SZ 8.0 SURGICAL PF LF -(50/BX 4BX/CA)

## (undated) DEVICE — SODIUM CHL IRRIGATION 0.9% 1000ML (12EA/CA)

## (undated) DEVICE — TIP INTPLS HFLO ML ORFC BTRY - (12/CS)  FOR SURGILAV

## (undated) DEVICE — BANDAGE ELASTIC STERILE MATRIX 6 X 10 (20EA/CA)

## (undated) DEVICE — BLADE SURGICAL #10 - (50/BX)

## (undated) DEVICE — DRAPE SURGICAL U 77X120 - (10/CA)

## (undated) DEVICE — COVER LIGHT HANDLE ALC PLUS DISP (18EA/BX)